# Patient Record
Sex: MALE | Race: WHITE | NOT HISPANIC OR LATINO | Employment: OTHER | URBAN - METROPOLITAN AREA
[De-identification: names, ages, dates, MRNs, and addresses within clinical notes are randomized per-mention and may not be internally consistent; named-entity substitution may affect disease eponyms.]

---

## 2020-09-23 ENCOUNTER — CONSULT (OUTPATIENT)
Dept: CARDIOLOGY | Facility: CLINIC | Age: 70
End: 2020-09-23

## 2020-09-23 VITALS
SYSTOLIC BLOOD PRESSURE: 120 MMHG | HEART RATE: 75 BPM | WEIGHT: 299 LBS | HEIGHT: 71 IN | DIASTOLIC BLOOD PRESSURE: 74 MMHG | TEMPERATURE: 98 F | OXYGEN SATURATION: 97 % | BODY MASS INDEX: 41.86 KG/M2

## 2020-09-23 DIAGNOSIS — Z86.73 HISTORY OF CVA (CEREBROVASCULAR ACCIDENT): ICD-10-CM

## 2020-09-23 DIAGNOSIS — I48.0 PAROXYSMAL ATRIAL FIBRILLATION (HCC): Primary | ICD-10-CM

## 2020-09-23 DIAGNOSIS — I10 ESSENTIAL HYPERTENSION: ICD-10-CM

## 2020-09-23 DIAGNOSIS — G47.33 OSA TREATED WITH BIPAP: ICD-10-CM

## 2020-09-23 PROCEDURE — 99204 OFFICE O/P NEW MOD 45 MIN: CPT | Performed by: INTERNAL MEDICINE

## 2020-09-23 PROCEDURE — 93280 PM DEVICE PROGR EVAL DUAL: CPT | Performed by: INTERNAL MEDICINE

## 2020-09-23 PROCEDURE — 93000 ELECTROCARDIOGRAM COMPLETE: CPT | Performed by: INTERNAL MEDICINE

## 2020-09-23 RX ORDER — GABAPENTIN 300 MG/1
300 CAPSULE ORAL 3 TIMES DAILY
COMMUNITY
End: 2021-02-23 | Stop reason: SDUPTHER

## 2020-09-23 RX ORDER — FLUTICASONE PROPIONATE 50 MCG
2 SPRAY, SUSPENSION (ML) NASAL DAILY
COMMUNITY
End: 2021-06-14 | Stop reason: SDUPTHER

## 2020-09-23 RX ORDER — BISOPROLOL FUMARATE 10 MG/1
5 TABLET, FILM COATED ORAL 2 TIMES DAILY
COMMUNITY
End: 2022-07-08

## 2020-09-23 RX ORDER — ATORVASTATIN CALCIUM 40 MG/1
40 TABLET, FILM COATED ORAL DAILY
COMMUNITY
End: 2022-04-11

## 2020-09-23 RX ORDER — ACETAMINOPHEN 160 MG/5ML
650 SOLUTION ORAL EVERY 6 HOURS PRN
COMMUNITY

## 2020-09-23 RX ORDER — WARFARIN SODIUM 10 MG/1
10 TABLET ORAL
COMMUNITY
End: 2021-11-10 | Stop reason: ALTCHOICE

## 2020-09-23 RX ORDER — PROBENECID 500 MG/1
500 TABLET, FILM COATED ORAL 2 TIMES DAILY
COMMUNITY

## 2020-09-23 NOTE — PROGRESS NOTES
Electrophysiology Clinic Consult     Walter Herrera  1950  [unfilled]  [unfilled]    09/23/20    DATE OF ADMISSION: (Not on file)  [facility] CARDIOLOGY    Michael Mendiola MD  [address] / [facility] [address]    Chief Complaint   Patient presents with   • Evaluation for Watchman Device       Problem List:  1. Paroxysmal Atrial Fibrillation   a. CHADSvasc = 6  on warfarin (used to be Eliquis- unable to afford cost)   b. Echocardiogram 10/11/19: EF 60%, moderate mitral annular calcification, LA normal in size  2. Coronary Artery Disease  a. CABG x 2 1/2016  b. Stress Test 10/11/19: normal myocardial perfusion imaging, EF normal without wall motion abnormality  3. Sick Sinus Syndrome  a. BSC PPM implant 4/2016  4. DMII - Metformin and insulin  5. HTN  6. HLP  7. MONIQUE - compliant with Bipap  8. History of CVA 6/2020 - on warfarin with subtherapeutic INR   9. Surgical History:  a. Cholecystectomy  b. CABG  c. Right shoulder surgery x 2   d. Right and left inguinal hernia repair       History of Present Illness:   The patient is a pleasant 69 year old WM with above history who presents today in consultation, referred by Dr. Claudio, for evaluation of Watchman Device. He has had atrial fibrillation since 2015 and never required ECV or been treated with an AAD other than beta blockers. The patient is overall controlled in regards to his atrial fibrillation with 8% burden on his device check today. He does have mild symptoms of palpitations and SOB, which does affect his quality of life, with episodes lasting about 1-2 minutes at a time. When in NSR, he feels well without complaints of CP or SOB.  He is currently on warfarin since June after he had a CVA. He had a subtherapeutic INR when he had a CVA. He used to be on Eliquis, but stopped due to cost. He has mild residual speech deficit.  When he first started on warfarin he did have labile INRs, but now INRs have  overall been therapeutic. He denies any current bleeding issues. He does not enjoy diet restrictions that he has to do with warfarin, and he thinks that his warfarin causes diarrhea. He has HTN which is well controlled. No thyroid issues. He has MONIQUE and wears Bipap nightly. No tobacco. Occasionally ETOH. He drinks half and half coffee.          Allergies   Allergen Reactions   • Erythromycin Diarrhea   • Morphine Unknown - Low Severity     Blisters   • Penicillins Rash        Cannot display prior to admission medications because the patient has not been admitted in this contact.            Current Outpatient Medications:   •  acetaminophen (TYLENOL) 500 MG tablet, Take 500 mg by mouth Every 6 (Six) Hours As Needed for Mild Pain ., Disp: , Rfl:   •  atorvastatin (LIPITOR) 80 MG tablet, Take 80 mg by mouth Daily., Disp: , Rfl:   •  bisoprolol (ZEBeta) 10 MG tablet, Take 10 mg by mouth Daily., Disp: , Rfl:   •  fluticasone (FLONASE) 50 MCG/ACT nasal spray, 2 sprays into the nostril(s) as directed by provider Daily., Disp: , Rfl:   •  gabapentin (NEURONTIN) 300 MG capsule, Take 300 mg by mouth 3 (Three) Times a Day., Disp: , Rfl:   •  insulin NPH-insulin regular (humuLIN 70/30,novoLIN 70/30) (70-30) 100 UNIT/ML injection, Inject  under the skin into the appropriate area as directed 2 (Two) Times a Day With Meals. 70 units qam and 16 units qpm, Disp: , Rfl:   •  metFORMIN (GLUCOPHAGE) 500 MG tablet, Take 500 mg by mouth 2 (Two) Times a Day With Meals., Disp: , Rfl:   •  probenecid (BENEMID) 500 MG tablet, Take 500 mg by mouth 2 (Two) Times a Day., Disp: , Rfl:   •  warfarin (COUMADIN) 10 MG tablet, Take 10 mg by mouth. 10mg on M,W,F,Sun, 12.5mg Tues & Sat and 11mg on Thurs., Disp: , Rfl:     Social History     Socioeconomic History   • Marital status:      Spouse name: Not on file   • Number of children: Not on file   • Years of education: Not on file   • Highest education level: Not on file   Tobacco Use   •  "Smoking status: Former Smoker     Types: Pipe     Quit date: 1972     Years since quittin.0   • Smokeless tobacco: Never Used   Substance and Sexual Activity   • Alcohol use: Yes     Alcohol/week: 1.0 standard drinks     Types: 1 Cans of beer per week     Comment: Occassionally with pizza   • Drug use: Never   • Sexual activity: Defer       Family History   Problem Relation Age of Onset   • Heart attack Mother    • Heart disease Sister    • Heart disease Brother    • Heart disease Brother        REVIEW OF SYSTEMS:   CONST:  No weight loss, fever, chills, weakness + fatigue.   HEENT:  No visual loss, blurred vision, double vision, yellow sclerae.                   No hearing loss, congestion, sore throat.   SKIN:      No rashes, urticaria, ulcers, sores.     RESP:     + shortness of breath,-  hemoptysis, cough, sputum.   GI:           No anorexia, nausea, vomiting, diarrhea. No abdominal pain, melena.   :         No burning on urination, hematuria or increased frequency.  ENDO:    No diaphoresis, cold or heat intolerance. No polyuria or polydipsia.   NEURO:  No headache, dizziness, syncope, paralysis, ataxia, or parasthesias.                  No change in bowel or bladder control. + history of CVA/TIA  MUSC:    No muscle, back pain, joint pain or stiffness.   HEME:    No anemia, bleeding, bruising. No history of DVT/PE.  PSYCH:  No history of depression, anxiety    Vitals:    20 0839   BP: 120/74   BP Location: Left arm   Patient Position: Sitting   Pulse: 75   Temp: 98 °F (36.7 °C)   SpO2: 97%   Weight: 136 kg (299 lb)   Height: 180.3 cm (71\")                 Physical Exam:  GEN: Well nourished, well-developed, no acute distress  HEENT: Normocephalic, atraumatic, PERRLA, moist mucous membranes  NECK: Supple, NO JVD, no thyromegaly, no lymphadenopathy  CARD: S1S2, RRR, no murmur, gallop, rub, PMI NL  LUNGS: Clear to auscultation, normal respiratory effort  ABDOMEN: Soft, nontender, normal bowel " sounds  EXTREMITIES: No gross deformities, no clubbing, cyanosis, or edema  SKIN: Warm, dry, no lesions  NEURO: No focal deficits, alert and oriented x 3  PSYCHIATRIC: Normal affect and mood      I personally viewed and interpreted the patient's EKG/Telemetry/lab data    No results found for: GLUCOSE, CALCIUM, NA, K, CO2, CL, BUN, CREATININE, EGFRIFAFRI, EGFRIFNONA, BCR, ANIONGAP  No results found for: WBC, HGB, HCT, MCV, PLT  No results found for: INR, PROTIME  No results found for: TSH, X5OZBHT, B4XKSUC, THYROIDAB    BSC PM Manual Interrogation: normal function. 8% atrial fibrillation. 8 years on battery.         ECG 12 Lead    Date/Time: 9/23/2020 9:32 AM  Performed by: Derek Vergara MD  Authorized by: Derek Vergara MD   Comparison: not compared with previous ECG   Previous ECG: no previous ECG available  Rhythm: sinus rhythm and paced  BPM: 75                ICD-10-CM ICD-9-CM   1. Paroxysmal atrial fibrillation (CMS/formerly [facility])  I48.0 427.31   2. History of CVA (cerebrovascular accident)  Z86.73 V12.54   3. Essential hypertension  I10 401.9   4. MONIQUE treated with BiPAP  G47.33 327.23       Assessment and Plan:   1. Paroxysmal Atrial Fibrillation:  - overall 8% burden on beta blocker alone. If he becomes more symptomatic or has more frequent episodes, could treat with antiarrythmic.  - CHADSVasc = 6  He has had a previous CVA in June 2020 and is of high risk for recurrent CVA. He has been on warfarin since his CVA and has had labile INRs and also side effects with the medication of GI upset. We did discuss replacing warfarin with a NOAC. He is unable to afford NOACs and was on Eliquis in the past, which he stopped just prior to his CVA. We discussed Watchman Device with the patient and his wife today. The risks, benefits, and alternatives of the procedure have been reviewed and the patient wishes to proceed. We discussed CVA risk off anticoagulants (7-8%), on anticoagulants (less than 1%), and with Watchman  Device (1%).        2. History of CVA:  - occurred June 2020 in setting of subtherapeutic INR    3. HTN:  - controlled on current medications    4. MONIQUE:  - patient is compliant with BiPaP        Scribed for Derek Vergara MD by Lesly Portillo PA-C. 9/23/2020  09:37 EDT     I, Derek Vergara MD, personally performed the services described in this documentation as scribed by the above named individual in my presence, and it is both accurate and complete.  9/23/2020  09:44 EDT

## 2020-10-05 ENCOUNTER — TELEPHONE (OUTPATIENT)
Dept: ENDOCRINOLOGY | Facility: CLINIC | Age: 70
End: 2020-10-05

## 2020-10-05 NOTE — TELEPHONE ENCOUNTER
PATIENT IS CALLING NEEDING TO SEE IF IT IS SAFE FOR HIM TO TAKE METOMUCEL FOR DIARRHEA. PATIENTS NUMBER -[phone]

## 2020-10-06 ENCOUNTER — TELEPHONE (OUTPATIENT)
Dept: CARDIOLOGY | Facility: CLINIC | Age: 70
End: 2020-10-06

## 2020-10-06 NOTE — TELEPHONE ENCOUNTER
Spoke with pt to follow up on discussion of Watchman device.  He is interested in proceeding.  States he has an appt with his eye doctor tomorrow for retina issue and anticipates surgery.  Pt asked for Dec Watchman date.  SDM letter and tool faxed to PCP Dr Michael Mendiola at [phone].  Pt will discuss at 10/16 ov.    Olena Barcenas RN

## 2020-10-14 DIAGNOSIS — I48.0 PAROXYSMAL ATRIAL FIBRILLATION (HCC): Primary | ICD-10-CM

## 2020-10-14 DIAGNOSIS — Z86.73 HISTORY OF CVA (CEREBROVASCULAR ACCIDENT): ICD-10-CM

## 2020-10-22 ENCOUNTER — TELEPHONE (OUTPATIENT)
Dept: CARDIOLOGY | Facility: CLINIC | Age: 70
End: 2020-10-22

## 2020-10-22 DIAGNOSIS — I48.0 PAROXYSMAL ATRIAL FIBRILLATION (HCC): Primary | ICD-10-CM

## 2020-10-22 NOTE — TELEPHONE ENCOUNTER
Spoke with pt about upcoming watchman.  He states he saw Dr Mendiola for SDM.    Spoke with Mabel in [place] office.  She will fax SDM and per Skylerna Dr Mendiola request [facility] [place] Anticoagulation clinic follow INRs.  Referral sent    Olena Barcenas RN

## 2020-10-23 ENCOUNTER — TELEPHONE (OUTPATIENT)
Dept: PHARMACY | Facility: HOSPITAL | Age: 70
End: 2020-10-23

## 2020-10-23 NOTE — TELEPHONE ENCOUNTER
"Spoke with patient re: new referral. Have briefly introduced myself and the warfarin clinic. Patient reports he is currently taking warfarin 10mg daily except for 11mg on Thurs and 12.5mg on TuesSat (76mg/week). He reports he has both 1mg and 5mg tablets on hand.. He reports his INR drawn last Fri, 10/16, at 2.9 and INR drawn \"a couple Thursdays prior\" at 2.3.     Patient would like to come into clinic to have INR drawn (vs having INR drawn locally at [facility]). Have set up appt for 10/30 at 1145. Patient confirms he has our phone number in case of questions/issues and is aware we will call day before to verify appt and COVID screening questions. He is agreeable to continue with current maintenance dose of warfarin and had no further questions at this time.  "

## 2020-10-23 NOTE — TELEPHONE ENCOUNTER
aWlter Herrera is a new referral to [facility] Anticoagulation clinic from Derek Vergara MD.    Patient's indication for anticoagulation therapy is for paroxysmal afib / hx of CVA. His INR goal range is 2.0-3.0. Patient will have watchman procedure on 11/1/20 and will be requested to hold warfarin 1 day prior to procedure. It appears patient was previously managed by PCP, Dr Michael Mendiola, but per Olena Barcenas, RN, note:      Spoke with pt about upcoming watchman.  He states he saw Dr Mendiola for SDM.     Spoke with Mabel in [place] office.  She will fax SDM and per Farzana Mendiola request [facility] Anticoagulation clinic follow INRs.  Referral sent    Per Dr. Vergara's consult on 9/23/20: CHADVASc = 6. Was previously on Eliquis but unable to afford cost.  He is currently on warfarin since June after he had a CVA. He had a subtherapeutic INR when he had a CVA. He used to be on Eliquis, but stopped due to cost. He has mild residual speech deficit.  When he first started on warfarin he did have labile INRs, but now INRs have overall been therapeutic. He denies any current bleeding issues. He does not enjoy diet restrictions that he has to do with warfarin, and he thinks that his warfarin causes diarrhea.

## 2020-10-27 ENCOUNTER — APPOINTMENT (OUTPATIENT)
Dept: PREADMISSION TESTING | Facility: HOSPITAL | Age: 70
End: 2020-10-27

## 2020-10-29 ENCOUNTER — PREP FOR SURGERY (OUTPATIENT)
Dept: OTHER | Facility: HOSPITAL | Age: 70
End: 2020-10-29

## 2020-10-29 DIAGNOSIS — I48.0 PAROXYSMAL ATRIAL FIBRILLATION (HCC): Primary | ICD-10-CM

## 2020-10-29 RX ORDER — NITROGLYCERIN 0.4 MG/1
0.4 TABLET SUBLINGUAL
Status: CANCELLED | OUTPATIENT
Start: 2020-10-29

## 2020-10-29 RX ORDER — ACETAMINOPHEN 325 MG/1
650 TABLET ORAL EVERY 4 HOURS PRN
Status: CANCELLED | OUTPATIENT
Start: 2020-10-29

## 2020-10-29 RX ORDER — CEFAZOLIN SODIUM 2 G/100ML
2 INJECTION, SOLUTION INTRAVENOUS ONCE
Status: CANCELLED | OUTPATIENT
Start: 2020-10-29 | End: 2020-10-29

## 2020-10-29 RX ORDER — SODIUM CHLORIDE 0.9 % (FLUSH) 0.9 %
10 SYRINGE (ML) INJECTION AS NEEDED
Status: CANCELLED | OUTPATIENT
Start: 2020-10-29

## 2020-10-29 RX ORDER — SODIUM CHLORIDE 0.9 % (FLUSH) 0.9 %
3 SYRINGE (ML) INJECTION EVERY 12 HOURS SCHEDULED
Status: CANCELLED | OUTPATIENT
Start: 2020-10-29

## 2020-10-29 RX ORDER — SODIUM CHLORIDE 9 MG/ML
1 INJECTION, SOLUTION INTRAVENOUS CONTINUOUS
Status: CANCELLED | OUTPATIENT
Start: 2020-10-29 | End: 2020-10-29

## 2020-10-30 ENCOUNTER — TELEPHONE (OUTPATIENT)
Dept: PHARMACY | Facility: HOSPITAL | Age: 70
End: 2020-10-30

## 2020-10-30 ENCOUNTER — APPOINTMENT (OUTPATIENT)
Dept: PHARMACY | Facility: HOSPITAL | Age: 70
End: 2020-10-30

## 2020-10-30 ENCOUNTER — APPOINTMENT (OUTPATIENT)
Dept: PREADMISSION TESTING | Facility: HOSPITAL | Age: 70
End: 2020-10-30

## 2020-10-30 DIAGNOSIS — I48.0 PAROXYSMAL ATRIAL FIBRILLATION (HCC): ICD-10-CM

## 2020-10-30 NOTE — TELEPHONE ENCOUNTER
Pt cancelled appointment today due his wife's hospitalization. He is also cancelling his watchman device procedure. Will contact the patient on Monday to determine how to move forward.

## 2020-11-12 ENCOUNTER — OFFICE VISIT (OUTPATIENT)
Dept: ENDOCRINOLOGY | Facility: CLINIC | Age: 70
End: 2020-11-12

## 2020-11-12 ENCOUNTER — LAB (OUTPATIENT)
Dept: LAB | Facility: HOSPITAL | Age: 70
End: 2020-11-12

## 2020-11-12 VITALS
WEIGHT: 294.4 LBS | HEIGHT: 71 IN | HEART RATE: 74 BPM | OXYGEN SATURATION: 97 % | SYSTOLIC BLOOD PRESSURE: 136 MMHG | TEMPERATURE: 98 F | DIASTOLIC BLOOD PRESSURE: 84 MMHG | BODY MASS INDEX: 41.22 KG/M2

## 2020-11-12 DIAGNOSIS — E11.65 UNCONTROLLED TYPE 2 DIABETES MELLITUS WITH HYPERGLYCEMIA (HCC): ICD-10-CM

## 2020-11-12 DIAGNOSIS — Z79.4 INSULIN LONG-TERM USE (HCC): ICD-10-CM

## 2020-11-12 DIAGNOSIS — E11.65 UNCONTROLLED TYPE 2 DIABETES MELLITUS WITH HYPERGLYCEMIA (HCC): Primary | ICD-10-CM

## 2020-11-12 DIAGNOSIS — E11.649 TYPE 2 DIABETES MELLITUS WITH HYPOGLYCEMIA WITHOUT COMA, WITH LONG-TERM CURRENT USE OF INSULIN (HCC): ICD-10-CM

## 2020-11-12 DIAGNOSIS — E11.49 TYPE 2 DIABETES MELLITUS WITH NEUROLOGICAL MANIFESTATIONS (HCC): ICD-10-CM

## 2020-11-12 DIAGNOSIS — I25.10 ATHEROSCLEROSIS OF NATIVE CORONARY ARTERY OF NATIVE HEART WITHOUT ANGINA PECTORIS: ICD-10-CM

## 2020-11-12 DIAGNOSIS — Z79.4 TYPE 2 DIABETES MELLITUS WITH HYPOGLYCEMIA WITHOUT COMA, WITH LONG-TERM CURRENT USE OF INSULIN (HCC): ICD-10-CM

## 2020-11-12 DIAGNOSIS — I10 ESSENTIAL HYPERTENSION: ICD-10-CM

## 2020-11-12 PROBLEM — E06.1 SUBACUTE THYROIDITIS: Status: ACTIVE | Noted: 2020-11-12

## 2020-11-12 PROBLEM — E78.2 MIXED HYPERLIPIDEMIA: Status: ACTIVE | Noted: 2020-11-12

## 2020-11-12 LAB
EXPIRATION DATE: NORMAL
HBA1C MFR BLD: 7.4 %
Lab: NORMAL

## 2020-11-12 PROCEDURE — 99214 OFFICE O/P EST MOD 30 MIN: CPT | Performed by: INTERNAL MEDICINE

## 2020-11-12 PROCEDURE — 83036 HEMOGLOBIN GLYCOSYLATED A1C: CPT | Performed by: INTERNAL MEDICINE

## 2020-11-12 PROCEDURE — 82570 ASSAY OF URINE CREATININE: CPT

## 2020-11-12 PROCEDURE — 82043 UR ALBUMIN QUANTITATIVE: CPT

## 2020-11-12 RX ORDER — BUMETANIDE 2 MG/1
1 TABLET ORAL DAILY PRN
COMMUNITY

## 2020-11-12 RX ORDER — SYRINGE AND NEEDLE,INSULIN,1ML 31 GX5/16"
SYRINGE, EMPTY DISPOSABLE MISCELLANEOUS
COMMUNITY
Start: 2020-10-08 | End: 2021-04-05 | Stop reason: SDUPTHER

## 2020-11-12 RX ORDER — NITROGLYCERIN 400 UG/1
SPRAY ORAL
COMMUNITY
End: 2021-10-04 | Stop reason: ALTCHOICE

## 2020-11-12 NOTE — ASSESSMENT & PLAN NOTE
Diabetes is unchanged.   Continue current treatment regimen.  Diabetes will be reassessed in 3 months.    A1c acceptable but higher.  He has been stressed.  His wife is very ill.  Recent FSBS okay though.

## 2020-11-12 NOTE — PROGRESS NOTES
"     Office Note      Date: 2020  Patient Name: Walter Herrera  MRN: [id number]  : 1950    Chief Complaint   Patient presents with   • Follow-up   • Diabetes       History of Present Illness:   Walter Herrera is a 69 y.o. male who presents for Diabetes type 2. Diagnosed in: . Treated in past with oral agents. Current treatments: metformin and premix insulin. Number of insulin shots per day: 2. Checks blood sugar 2 times a day. Has low blood sugar: occasional. Aspirin use: No - on warfarin. Statin use: Yes. ACE-I/ARB use: No -  . Changes in health since last visit: cataracts. Last eye exam .    Subjective      Diabetic Complications:  Eyes: No  Kidneys: No  Feet: Yes - neuropathy  Heart: Yes -      Diet and Exercise:  Meals per day: 3  Minutes of exercise per week: 0 mins.    Review of Systems:   Review of Systems   Constitutional: Negative.    Cardiovascular: Negative.    Gastrointestinal: Negative.    Endocrine: Negative.        The following portions of the patient's history were reviewed and updated as appropriate: allergies, current medications, past family history, past medical history, past social history, past surgical history and problem list.    Objective       Visit Vitals  /84   Pulse 74   Temp 98 °F (36.7 °C) (Infrared)   Ht 180.3 cm (71\")   Wt 134 kg (294 lb 6.4 oz)   SpO2 97%   BMI 41.06 kg/m²       Physical Exam:  Physical Exam  Constitutional:       Appearance: Normal appearance.   Neurological:      Mental Status: He is alert.         Labs:    HbA1c  Lab Results   Component Value Date    HGBA1C 7.4 2020       CMP  No results found for: GLUCOSE, BUN, CREATININE, EGFRIFNONA, EGFRIFAFRI, BCR, K, CO2, CALCIUM, PROTENTOTREF, LABIL2, BILIRUBIN, AST, ALT     Lipid Panel        TSH  No results found for: TSH, FREET4     Hemoglobin A1C  Lab Results   Component Value Date    HGBA1C 7.4 2020        Microalbumin/Creatinine  No results found for: MALBCRERATIO, CREATINIURIN, " MICROALBUR        Assessment / Plan      Assessment & Plan:  Problem List Items Addressed This Visit        Cardiovascular and Mediastinum    Essential hypertension    Current Assessment & Plan     Hypertension is unchanged.  Continue current treatment regimen.  Blood pressure will be reassessed in 3 months.         Relevant Medications    bisoprolol (ZEBeta) 10 MG tablet    bumetanide (BUMEX) 2 MG tablet    Atherosclerosis of native coronary artery of native heart without angina pectoris    Current Assessment & Plan     Continue cardiology follow up.         Relevant Medications    bisoprolol (ZEBeta) 10 MG tablet    nitroglycerin (Nitrolingual) 0.4 MG/SPRAY spray       Endocrine    Uncontrolled type 2 diabetes mellitus with hyperglycemia (CMS/HCC) - Primary    Current Assessment & Plan     Diabetes is unchanged.   Continue current treatment regimen.  Diabetes will be reassessed in 3 months.    A1c acceptable but higher.  He has been stressed.  His wife is very ill.  Recent FSBS okay though.         Relevant Medications    metFORMIN (GLUCOPHAGE) 500 MG tablet    insulin NPH-insulin regular (humuLIN 70/30,novoLIN 70/30) (70-30) 100 UNIT/ML injection    Other Relevant Orders    POC Glycosylated Hemoglobin (Hb A1C) (Completed)    Microalbumin / Creatinine Urine Ratio - Urine, Clean Catch    Type 2 diabetes mellitus with hypoglycemia without coma, with long-term current use of insulin (CMS/HCC)    Relevant Medications    metFORMIN (GLUCOPHAGE) 500 MG tablet    insulin NPH-insulin regular (humuLIN 70/30,novoLIN 70/30) (70-30) 100 UNIT/ML injection    Type 2 diabetes mellitus with neurological manifestations (CMS/HCC)    Relevant Medications    metFORMIN (GLUCOPHAGE) 500 MG tablet    insulin NPH-insulin regular (humuLIN 70/30,novoLIN 70/30) (70-30) 100 UNIT/ML injection       Other    Insulin long-term use (CMS/HCC)           Return in about 3 months (around 2/12/2021) for Recheck with A1c.    Misael Vidales MD    11/12/2020

## 2020-11-13 LAB
ALBUMIN UR-MCNC: <1.2 MG/DL
CREAT UR-MCNC: 40.7 MG/DL
MICROALBUMIN/CREAT UR: NORMAL MG/G{CREAT}

## 2020-12-23 ENCOUNTER — TELEPHONE (OUTPATIENT)
Dept: ENDOCRINOLOGY | Facility: CLINIC | Age: 70
End: 2020-12-23

## 2021-01-07 ENCOUNTER — TELEPHONE (OUTPATIENT)
Dept: ENDOCRINOLOGY | Facility: CLINIC | Age: 71
End: 2021-01-07

## 2021-01-07 NOTE — TELEPHONE ENCOUNTER
----- Message from Misael Vidales MD sent at 1/7/2021  3:42 PM EST -----      ----- Message -----  From: Darcy Avila  Sent: 1/7/2021   3:36 PM EST  To: Misael Vidales MD

## 2021-01-20 ENCOUNTER — TELEPHONE (OUTPATIENT)
Dept: ENDOCRINOLOGY | Facility: CLINIC | Age: 71
End: 2021-01-20

## 2021-01-20 NOTE — TELEPHONE ENCOUNTER
----- Message from Misael Vidales MD sent at 1/19/2021 11:16 AM EST -----    Glucose readings are okay aside from when he forgets his medication or eats poorly.  Just needs to work on being more consistent with diet and medications.

## 2021-02-19 ENCOUNTER — TELEPHONE (OUTPATIENT)
Dept: ENDOCRINOLOGY | Facility: CLINIC | Age: 71
End: 2021-02-19

## 2021-02-19 NOTE — TELEPHONE ENCOUNTER
Patient notified and agreeable with plan.  He wanted to let you know that his wife passed away on Monday.

## 2021-02-23 DIAGNOSIS — E11.49 TYPE 2 DIABETES MELLITUS WITH NEUROLOGICAL MANIFESTATIONS (HCC): Primary | ICD-10-CM

## 2021-02-23 RX ORDER — GABAPENTIN 300 MG/1
300 CAPSULE ORAL 3 TIMES DAILY
Qty: 270 CAPSULE | Refills: 1 | Status: SHIPPED | OUTPATIENT
Start: 2021-02-23 | End: 2021-08-30

## 2021-02-25 ENCOUNTER — OFFICE VISIT (OUTPATIENT)
Dept: ENDOCRINOLOGY | Facility: CLINIC | Age: 71
End: 2021-02-25

## 2021-02-25 VITALS
DIASTOLIC BLOOD PRESSURE: 64 MMHG | SYSTOLIC BLOOD PRESSURE: 122 MMHG | TEMPERATURE: 97.1 F | BODY MASS INDEX: 37.93 KG/M2 | HEIGHT: 72 IN | WEIGHT: 280 LBS

## 2021-02-25 DIAGNOSIS — Z79.4 TYPE 2 DIABETES MELLITUS WITH HYPOGLYCEMIA WITHOUT COMA, WITH LONG-TERM CURRENT USE OF INSULIN (HCC): ICD-10-CM

## 2021-02-25 DIAGNOSIS — E11.649 TYPE 2 DIABETES MELLITUS WITH HYPOGLYCEMIA WITHOUT COMA, WITH LONG-TERM CURRENT USE OF INSULIN (HCC): ICD-10-CM

## 2021-02-25 DIAGNOSIS — Z79.4 INSULIN LONG-TERM USE (HCC): ICD-10-CM

## 2021-02-25 DIAGNOSIS — E11.65 UNCONTROLLED TYPE 2 DIABETES MELLITUS WITH HYPERGLYCEMIA (HCC): Primary | ICD-10-CM

## 2021-02-25 DIAGNOSIS — I10 ESSENTIAL HYPERTENSION: ICD-10-CM

## 2021-02-25 DIAGNOSIS — E78.2 MIXED HYPERLIPIDEMIA: ICD-10-CM

## 2021-02-25 LAB
EXPIRATION DATE: NORMAL
HBA1C MFR BLD: 7.2 %
Lab: NORMAL

## 2021-02-25 PROCEDURE — 83036 HEMOGLOBIN GLYCOSYLATED A1C: CPT | Performed by: INTERNAL MEDICINE

## 2021-02-25 PROCEDURE — 99214 OFFICE O/P EST MOD 30 MIN: CPT | Performed by: INTERNAL MEDICINE

## 2021-02-25 RX ORDER — LANCETS
EACH MISCELLANEOUS
COMMUNITY
Start: 2020-12-01 | End: 2021-04-05 | Stop reason: SDUPTHER

## 2021-02-25 RX ORDER — BLOOD SUGAR DIAGNOSTIC
STRIP MISCELLANEOUS
COMMUNITY
Start: 2020-12-07 | End: 2021-09-24

## 2021-02-25 NOTE — PROGRESS NOTES
"     Office Note      Date: 2021  Patient Name: Walter Herrera  MRN: [id number]  : 1950    Chief Complaint   Patient presents with   • Follow-up   • Diabetes       History of Present Illness:   Walter Herrera is a 70 y.o. male who presents for Diabetes type 2. Diagnosed in: . Treated in past with oral agents. Current treatments: metformin and premix insulin. Number of insulin shots per day: 2. Checks blood sugar 2 times a day. Has low blood sugar: occasional. Aspirin use: No - on warfarin. Statin use: Yes. ACE-I/ARB use: No -  . Changes in health since last visit: none. Last eye exam .    He has been stressed.  His wife recently passed away.    Subjective      Diabetic Complications:  Eyes: No  Kidneys: No  Feet: Yes - neuropathy  Heart: Yes -      Diet and Exercise:  Meals per day: 3  Minutes of exercise per week: 0 mins.    Review of Systems:   Review of Systems   Constitutional: Negative.    Cardiovascular: Negative.    Gastrointestinal: Negative.    Endocrine: Negative.        The following portions of the patient's history were reviewed and updated as appropriate: allergies, current medications, past family history, past medical history, past social history, past surgical history and problem list.    Objective       Visit Vitals  /64   Temp 97.1 °F (36.2 °C) (Infrared)   Ht 182.9 cm (72\")   Wt 127 kg (280 lb)   BMI 37.97 kg/m²       Physical Exam:  Physical Exam  Constitutional:       Appearance: Normal appearance.   Neurological:      Mental Status: He is alert.         Labs:    HbA1c  Lab Results   Component Value Date    HGBA1C 7.2 2021       CMP  No results found for: GLUCOSE, BUN, CREATININE, EGFRIFNONA, EGFRIFAFRI, BCR, K, CO2, CALCIUM, PROTENTOTREF, LABIL2, BILIRUBIN, AST, ALT     Lipid Panel        TSH  No results found for: TSH, FREET4     Hemoglobin A1C  Lab Results   Component Value Date    HGBA1C 7.2 2021        Microalbumin/Creatinine  Lab Results   Component " Value Date    MAGI  11/12/2020      Comment:      Unable to calculate    MICROALBUR <1.2 11/12/2020           Assessment / Plan      Assessment & Plan:  Diagnoses and all orders for this visit:    1. Uncontrolled type 2 diabetes mellitus with hyperglycemia (CMS/[facility]) (Primary)  Assessment & Plan:  Diabetes is improving with treatment.   Continue current treatment regimen.  Diabetes will be reassessed in 3 months.    Orders:  -     POC Glycosylated Hemoglobin (Hb A1C)    2. Type 2 diabetes mellitus with hypoglycemia without coma, with long-term current use of insulin (CMS/[facility])  Assessment & Plan:  None severe.  Continue frequent FSBS.      3. Essential hypertension  Assessment & Plan:  Hypertension is unchanged.  Continue current treatment regimen.  Blood pressure will be reassessed in 3 months.      4. Mixed hyperlipidemia  Assessment & Plan:  Continue statin.      5. Insulin long-term use (CMS/[facility])      Return in about 3 months (around 5/25/2021) for Recheck with A1c.    Misael Vidales MD   02/25/2021

## 2021-03-30 ENCOUNTER — TELEPHONE (OUTPATIENT)
Dept: ENDOCRINOLOGY | Facility: CLINIC | Age: 71
End: 2021-03-30

## 2021-03-30 NOTE — TELEPHONE ENCOUNTER
----- Message from Misael Vidales MD sent at 3/30/2021  7:49 AM EDT -----      ----- Message -----  From: Maryam Donaldson RegSched Rep  Sent: 3/29/2021  10:09 AM EDT  To: Misael Vidales MD

## 2021-04-05 RX ORDER — LANCETS
EACH MISCELLANEOUS
Qty: 200 EACH | Refills: 1 | Status: SHIPPED | OUTPATIENT
Start: 2021-04-05 | End: 2021-04-13 | Stop reason: SDUPTHER

## 2021-04-05 RX ORDER — SYRINGE AND NEEDLE,INSULIN,1ML 31 GX5/16"
SYRINGE, EMPTY DISPOSABLE MISCELLANEOUS
Qty: 200 EACH | Refills: 1 | Status: SHIPPED | OUTPATIENT
Start: 2021-04-05 | End: 2021-09-24

## 2021-04-13 RX ORDER — LANCETS
EACH MISCELLANEOUS
Qty: 200 EACH | Refills: 1 | Status: SHIPPED | OUTPATIENT
Start: 2021-04-13 | End: 2021-09-24

## 2021-05-06 ENCOUNTER — TELEPHONE (OUTPATIENT)
Dept: CARDIOLOGY | Facility: HOSPITAL | Age: 71
End: 2021-05-06

## 2021-05-06 NOTE — TELEPHONE ENCOUNTER
Spoke with pt to follow up on Watchman device.  He states he would like to proceed in the fall.  Scheduled for 9/2021.  Encouraged to call with any questions in the interim.    Olena Barcenas RN'

## 2021-06-08 ENCOUNTER — TELEPHONE (OUTPATIENT)
Dept: ENDOCRINOLOGY | Facility: CLINIC | Age: 71
End: 2021-06-08

## 2021-06-08 NOTE — TELEPHONE ENCOUNTER
----- Message from Misael Vidales MD sent at 6/8/2021 12:30 PM EDT -----      ----- Message -----  From: Darcy Avila  Sent: 6/8/2021  10:30 AM EDT  To: Misael Vidales MD

## 2021-06-14 ENCOUNTER — OFFICE VISIT (OUTPATIENT)
Dept: ENDOCRINOLOGY | Facility: CLINIC | Age: 71
End: 2021-06-14

## 2021-06-14 VITALS
OXYGEN SATURATION: 97 % | BODY MASS INDEX: 42.73 KG/M2 | HEART RATE: 73 BPM | WEIGHT: 305.2 LBS | SYSTOLIC BLOOD PRESSURE: 148 MMHG | DIASTOLIC BLOOD PRESSURE: 72 MMHG | HEIGHT: 71 IN

## 2021-06-14 DIAGNOSIS — I10 ESSENTIAL HYPERTENSION: ICD-10-CM

## 2021-06-14 DIAGNOSIS — E11.65 UNCONTROLLED TYPE 2 DIABETES MELLITUS WITH HYPERGLYCEMIA (HCC): Primary | ICD-10-CM

## 2021-06-14 DIAGNOSIS — E11.649 TYPE 2 DIABETES MELLITUS WITH HYPOGLYCEMIA WITHOUT COMA, WITH LONG-TERM CURRENT USE OF INSULIN (HCC): ICD-10-CM

## 2021-06-14 DIAGNOSIS — Z79.4 TYPE 2 DIABETES MELLITUS WITH HYPOGLYCEMIA WITHOUT COMA, WITH LONG-TERM CURRENT USE OF INSULIN (HCC): ICD-10-CM

## 2021-06-14 DIAGNOSIS — I25.10 ATHEROSCLEROSIS OF NATIVE CORONARY ARTERY OF NATIVE HEART WITHOUT ANGINA PECTORIS: ICD-10-CM

## 2021-06-14 DIAGNOSIS — Z79.4 INSULIN LONG-TERM USE (HCC): ICD-10-CM

## 2021-06-14 DIAGNOSIS — E78.2 MIXED HYPERLIPIDEMIA: ICD-10-CM

## 2021-06-14 LAB
EXPIRATION DATE: NORMAL
EXPIRATION DATE: NORMAL
GLUCOSE BLDC GLUCOMTR-MCNC: 93 MG/DL (ref 70–130)
HBA1C MFR BLD: 7.5 %
Lab: NORMAL
Lab: NORMAL

## 2021-06-14 PROCEDURE — 82947 ASSAY GLUCOSE BLOOD QUANT: CPT | Performed by: INTERNAL MEDICINE

## 2021-06-14 PROCEDURE — 99214 OFFICE O/P EST MOD 30 MIN: CPT | Performed by: INTERNAL MEDICINE

## 2021-06-14 PROCEDURE — 83036 HEMOGLOBIN GLYCOSYLATED A1C: CPT | Performed by: INTERNAL MEDICINE

## 2021-06-14 RX ORDER — FLUTICASONE PROPIONATE 50 MCG
2 SPRAY, SUSPENSION (ML) NASAL DAILY
Qty: 16 G | Refills: 3 | Status: SHIPPED | OUTPATIENT
Start: 2021-06-14 | End: 2022-03-21

## 2021-06-14 NOTE — ASSESSMENT & PLAN NOTE
Diabetes is worsening.   Continue current treatment regimen.  Diabetes will be reassessed in 3 months.    He has been sending in FSBS for insulin adjustments.

## 2021-06-14 NOTE — PROGRESS NOTES
"     Office Note      Date: 2021  Patient Name: Walter Herrera  MRN: [id number]  : 1950    Chief Complaint   Patient presents with   • Follow-up   • Diabetes     type2    • Diabetic Eye Exam     2021       History of Present Illness:   Walter Herrera is a 70 y.o. male who presents for Diabetes type 2. Diagnosed in: . Treated in past with oral agents. Current treatments: metformin and premix insulin. Number of insulin shots per day: 2. Checks blood sugar 2 times a day. Has low blood sugar: occasional. Aspirin use: No - on warfarin. Statin use: Yes. ACE-I/ARB use: No -  . Changes in health since last visit: none. Last eye exam 10/2020.    Subjective      Diabetic Complications:  Eyes: No  Kidneys: No  Feet: Yes - neuropathy  Heart: Yes -      Diet and Exercise:  Meals per day: 3  Minutes of exercise per week: 0 mins.    Review of Systems:   Review of Systems   Constitutional: Negative.    Cardiovascular: Negative.    Gastrointestinal: Negative.    Endocrine: Negative.        The following portions of the patient's history were reviewed and updated as appropriate: allergies, current medications, past family history, past medical history, past social history, past surgical history and problem list.    Objective       Visit Vitals  /72   Pulse 73   Ht 180.3 cm (71\")   Wt (!) 138 kg (305 lb 3.2 oz)   SpO2 97%   BMI 42.57 kg/m²       Physical Exam:  Physical Exam  Constitutional:       Appearance: Normal appearance.   Neurological:      Mental Status: He is alert.         Labs:    HbA1c  Lab Results   Component Value Date    HGBA1C 7.5 2021       CMP  No results found for: GLUCOSE, BUN, CREATININE, EGFRIFNONA, EGFRIFAFRI, BCR, K, CO2, CALCIUM, PROTENTOTREF, LABIL2, BILIRUBIN, AST, ALT     Lipid Panel        TSH  No results found for: TSH, FREET4     Hemoglobin A1C  Lab Results   Component Value Date    HGBA1C 7.5 2021        Microalbumin/Creatinine  Lab Results   Component Value Date    " MAGI  11/12/2020      Comment:      Unable to calculate    MICROALBUR <1.2 11/12/2020           Assessment / Plan      Assessment & Plan:  Diagnoses and all orders for this visit:    1. Uncontrolled type 2 diabetes mellitus with hyperglycemia (CMS/[facility]) (Primary)  Assessment & Plan:  Diabetes is worsening.   Continue current treatment regimen.  Diabetes will be reassessed in 3 months.    He has been sending in FSBS for insulin adjustments.    Orders:  -     POC Glycosylated Hemoglobin (Hb A1C)  -     POC Glucose, Blood    2. Essential hypertension  Assessment & Plan:  Hypertension is worsening.  BP borderline.  Continue current medications.  Monitor BP at home.  Work on weight loss.  Blood pressure will be reassessed at the next regular appointment.      3. Mixed hyperlipidemia  Assessment & Plan:  Continue statin.      4. Atherosclerosis of native coronary artery of native heart without angina pectoris  Assessment & Plan:  Continue statin.  We have discussed GLP-1 RA and SGLT-2 inhibitor but cost of these has been prohibitive.      5. Insulin long-term use (CMS/[facility])    6. Type 2 diabetes mellitus with hypoglycemia without coma, with long-term current use of insulin (CMS/[facility])  Assessment & Plan:  Having some fasting lows.  Decrease PM insulin.      Other orders  -     fluticasone (FLONASE) 50 MCG/ACT nasal spray; 2 sprays into the nostril(s) as directed by provider Daily.  Dispense: 16 g; Refill: 3      Return in about 3 months (around 9/14/2021) for Recheck with A1c, CMP, lipids, TSH, microalbumin.    Misael Vidales MD   06/14/2021

## 2021-06-14 NOTE — ASSESSMENT & PLAN NOTE
Hypertension is worsening.  BP borderline.  Continue current medications.  Monitor BP at home.  Work on weight loss.  Blood pressure will be reassessed at the next regular appointment.

## 2021-06-14 NOTE — ASSESSMENT & PLAN NOTE
Continue statin.  We have discussed GLP-1 RA and SGLT-2 inhibitor but cost of these has been prohibitive.

## 2021-07-28 ENCOUNTER — TELEPHONE (OUTPATIENT)
Dept: ENDOCRINOLOGY | Facility: CLINIC | Age: 71
End: 2021-07-28

## 2021-07-28 NOTE — TELEPHONE ENCOUNTER
----- Message from Misael Vidales MD sent at 7/28/2021  2:59 PM EDT -----      ----- Message -----  From: Darcy Avila  Sent: 7/28/2021   2:38 PM EDT  To: Misael Vidales MD

## 2021-08-12 ENCOUNTER — TELEPHONE (OUTPATIENT)
Dept: ENDOCRINOLOGY | Facility: CLINIC | Age: 71
End: 2021-08-12

## 2021-08-12 NOTE — TELEPHONE ENCOUNTER
Call pt per wrm:  He reviewed blood sugars increase am insulin by 5 units.  He will take 82 units am.  He voiced understanding

## 2021-08-24 ENCOUNTER — TELEPHONE (OUTPATIENT)
Dept: ENDOCRINOLOGY | Facility: CLINIC | Age: 71
End: 2021-08-24

## 2021-08-24 NOTE — TELEPHONE ENCOUNTER
Called pt the let him know that his glucose reading were okay and no changes needed to be made, per Dr. Vidales.    He verbally understood.

## 2021-08-27 DIAGNOSIS — I48.0 PAROXYSMAL ATRIAL FIBRILLATION (HCC): Primary | ICD-10-CM

## 2021-08-27 DIAGNOSIS — R79.1 SUPRATHERAPEUTIC INR: ICD-10-CM

## 2021-08-28 DIAGNOSIS — E11.49 TYPE 2 DIABETES MELLITUS WITH NEUROLOGICAL MANIFESTATIONS (HCC): ICD-10-CM

## 2021-08-30 RX ORDER — GABAPENTIN 300 MG/1
CAPSULE ORAL
Qty: 270 CAPSULE | Refills: 0 | Status: SHIPPED | OUTPATIENT
Start: 2021-08-30 | End: 2021-12-27

## 2021-09-02 ENCOUNTER — PREP FOR SURGERY (OUTPATIENT)
Dept: OTHER | Facility: HOSPITAL | Age: 71
End: 2021-09-02

## 2021-09-02 DIAGNOSIS — I48.0 PAROXYSMAL ATRIAL FIBRILLATION (HCC): Primary | ICD-10-CM

## 2021-09-02 RX ORDER — SODIUM CHLORIDE 0.9 % (FLUSH) 0.9 %
10 SYRINGE (ML) INJECTION AS NEEDED
Status: CANCELLED | OUTPATIENT
Start: 2021-09-02

## 2021-09-02 RX ORDER — SODIUM CHLORIDE 0.9 % (FLUSH) 0.9 %
3 SYRINGE (ML) INJECTION EVERY 12 HOURS SCHEDULED
Status: CANCELLED | OUTPATIENT
Start: 2021-09-02

## 2021-09-02 RX ORDER — SODIUM CHLORIDE 9 MG/ML
1 INJECTION, SOLUTION INTRAVENOUS CONTINUOUS
Status: CANCELLED | OUTPATIENT
Start: 2021-09-02 | End: 2021-09-02

## 2021-09-02 RX ORDER — ACETAMINOPHEN 325 MG/1
650 TABLET ORAL EVERY 4 HOURS PRN
Status: CANCELLED | OUTPATIENT
Start: 2021-09-02

## 2021-09-15 ENCOUNTER — APPOINTMENT (OUTPATIENT)
Dept: PREADMISSION TESTING | Facility: HOSPITAL | Age: 71
End: 2021-09-15

## 2021-09-22 ENCOUNTER — TELEPHONE (OUTPATIENT)
Dept: ENDOCRINOLOGY | Facility: CLINIC | Age: 71
End: 2021-09-22

## 2021-09-24 ENCOUNTER — PRE-ADMISSION TESTING (OUTPATIENT)
Dept: PREADMISSION TESTING | Facility: HOSPITAL | Age: 71
End: 2021-09-24

## 2021-09-24 DIAGNOSIS — I48.0 PAROXYSMAL ATRIAL FIBRILLATION (HCC): ICD-10-CM

## 2021-09-24 LAB
ANION GAP SERPL CALCULATED.3IONS-SCNC: 10 MMOL/L (ref 5–15)
BUN SERPL-MCNC: 13 MG/DL (ref 8–23)
BUN/CREAT SERPL: 11.7 (ref 7–25)
CALCIUM SPEC-SCNC: 9.7 MG/DL (ref 8.6–10.5)
CHLORIDE SERPL-SCNC: 101 MMOL/L (ref 98–107)
CO2 SERPL-SCNC: 28 MMOL/L (ref 22–29)
CREAT SERPL-MCNC: 1.11 MG/DL (ref 0.76–1.27)
DEPRECATED RDW RBC AUTO: 47.7 FL (ref 37–54)
ERYTHROCYTE [DISTWIDTH] IN BLOOD BY AUTOMATED COUNT: 14.3 % (ref 12.3–15.4)
GFR SERPL CREATININE-BSD FRML MDRD: 65 ML/MIN/1.73
GLUCOSE SERPL-MCNC: 159 MG/DL (ref 65–99)
HBA1C MFR BLD: 7.7 % (ref 4.8–5.6)
HCT VFR BLD AUTO: 47.8 % (ref 37.5–51)
HGB BLD-MCNC: 15.8 G/DL (ref 13–17.7)
INR PPP: 1.44 (ref 0.85–1.16)
MCH RBC QN AUTO: 30.1 PG (ref 26.6–33)
MCHC RBC AUTO-ENTMCNC: 33.1 G/DL (ref 31.5–35.7)
MCV RBC AUTO: 91 FL (ref 79–97)
PLATELET # BLD AUTO: 258 10*3/MM3 (ref 140–450)
PMV BLD AUTO: 9.5 FL (ref 6–12)
POTASSIUM SERPL-SCNC: 5.1 MMOL/L (ref 3.5–5.2)
PROTHROMBIN TIME: 17 SECONDS (ref 11.4–14.4)
RBC # BLD AUTO: 5.25 10*6/MM3 (ref 4.14–5.8)
SARS-COV-2 RNA PNL SPEC NAA+PROBE: NOT DETECTED
SODIUM SERPL-SCNC: 139 MMOL/L (ref 136–145)
WBC # BLD AUTO: 7.65 10*3/MM3 (ref 3.4–10.8)

## 2021-09-24 PROCEDURE — 85610 PROTHROMBIN TIME: CPT

## 2021-09-24 PROCEDURE — U0004 COV-19 TEST NON-CDC HGH THRU: HCPCS

## 2021-09-24 PROCEDURE — C9803 HOPD COVID-19 SPEC COLLECT: HCPCS

## 2021-09-24 PROCEDURE — 83036 HEMOGLOBIN GLYCOSYLATED A1C: CPT | Performed by: NURSE PRACTITIONER

## 2021-09-24 PROCEDURE — U0005 INFEC AGEN DETEC AMPLI PROBE: HCPCS

## 2021-09-24 PROCEDURE — 36415 COLL VENOUS BLD VENIPUNCTURE: CPT

## 2021-09-24 PROCEDURE — 85027 COMPLETE CBC AUTOMATED: CPT

## 2021-09-24 PROCEDURE — 80048 BASIC METABOLIC PNL TOTAL CA: CPT

## 2021-09-24 RX ORDER — MINOCYCLINE HYDROCHLORIDE 100 MG/1
100 CAPSULE ORAL DAILY
COMMUNITY

## 2021-09-26 ENCOUNTER — TELEPHONE (OUTPATIENT)
Dept: CARDIOLOGY | Facility: CLINIC | Age: 71
End: 2021-09-26

## 2021-09-26 DIAGNOSIS — I48.0 PAROXYSMAL ATRIAL FIBRILLATION (HCC): Primary | ICD-10-CM

## 2021-09-26 DIAGNOSIS — R79.1 SUPRATHERAPEUTIC INR: ICD-10-CM

## 2021-09-27 ENCOUNTER — HOSPITAL ENCOUNTER (OUTPATIENT)
Dept: CARDIOLOGY | Facility: HOSPITAL | Age: 71
Discharge: HOME | End: 2021-09-27

## 2021-09-27 ENCOUNTER — HOSPITAL ENCOUNTER (INPATIENT)
Facility: HOSPITAL | Age: 71
LOS: 1 days | Discharge: HOME | End: 2021-09-27
Attending: INTERNAL MEDICINE | Admitting: INTERNAL MEDICINE

## 2021-09-27 ENCOUNTER — ANESTHESIA (OUTPATIENT)
Dept: CARDIOLOGY | Facility: HOSPITAL | Age: 71
End: 2021-09-27

## 2021-09-27 ENCOUNTER — TELEPHONE (OUTPATIENT)
Dept: ENDOCRINOLOGY | Facility: CLINIC | Age: 71
End: 2021-09-27

## 2021-09-27 ENCOUNTER — ANESTHESIA EVENT (OUTPATIENT)
Dept: CARDIOLOGY | Facility: HOSPITAL | Age: 71
End: 2021-09-27

## 2021-09-27 VITALS — HEIGHT: 71 IN | WEIGHT: 309 LBS | BODY MASS INDEX: 43.26 KG/M2

## 2021-09-27 VITALS
DIASTOLIC BLOOD PRESSURE: 66 MMHG | BODY MASS INDEX: 43.26 KG/M2 | TEMPERATURE: 98.2 F | HEART RATE: 76 BPM | RESPIRATION RATE: 18 BRPM | OXYGEN SATURATION: 97 % | WEIGHT: 309 LBS | HEIGHT: 71 IN | SYSTOLIC BLOOD PRESSURE: 127 MMHG

## 2021-09-27 DIAGNOSIS — I48.0 PAROXYSMAL ATRIAL FIBRILLATION (HCC): ICD-10-CM

## 2021-09-27 DIAGNOSIS — R79.1 SUPRATHERAPEUTIC INR: ICD-10-CM

## 2021-09-27 DIAGNOSIS — Z86.73 HISTORY OF CVA (CEREBROVASCULAR ACCIDENT): ICD-10-CM

## 2021-09-27 LAB
ACT BLD: 131 SECONDS (ref 82–152)
ACT BLD: 345 SECONDS (ref 82–152)
BH CV ECHO MEAS - AO ROOT DIAM: 3.6 CM
BH CV ECHO MEAS - BSA(HAYCOCK): 2.7 M^2
BH CV ECHO MEAS - BSA: 2.5 M^2
BH CV ECHO MEAS - BZI_BMI: 43.3 KILOGRAMS/M^2
BH CV ECHO MEAS - BZI_METRIC_HEIGHT: 180 CM
BH CV ECHO MEAS - BZI_METRIC_WEIGHT: 140.2 KG
BH CV VAS BP LEFT ARM: NORMAL MMHG
GLUCOSE BLDC GLUCOMTR-MCNC: 113 MG/DL (ref 70–130)
GLUCOSE BLDC GLUCOMTR-MCNC: 128 MG/DL (ref 70–130)
GLUCOSE BLDC GLUCOMTR-MCNC: 143 MG/DL (ref 70–130)
INR PPP: 1.6 (ref 0.8–1.2)
MAXIMAL PREDICTED HEART RATE: 150 BPM
PROTHROMBIN TIME: 19.2 SECONDS (ref 12.8–15.2)
STRESS TARGET HR: 128 BPM

## 2021-09-27 PROCEDURE — 0 IOPAMIDOL PER 1 ML: Performed by: INTERNAL MEDICINE

## 2021-09-27 PROCEDURE — C1760 CLOSURE DEV, VASC: HCPCS | Performed by: INTERNAL MEDICINE

## 2021-09-27 PROCEDURE — 93799 UNLISTED CV SVC/PROCEDURE: CPT | Performed by: INTERNAL MEDICINE

## 2021-09-27 PROCEDURE — C1759 CATH, INTRA ECHOCARDIOGRAPHY: HCPCS | Performed by: INTERNAL MEDICINE

## 2021-09-27 PROCEDURE — C1769 GUIDE WIRE: HCPCS | Performed by: INTERNAL MEDICINE

## 2021-09-27 PROCEDURE — C1894 INTRO/SHEATH, NON-LASER: HCPCS | Performed by: INTERNAL MEDICINE

## 2021-09-27 PROCEDURE — 25010000002 VANCOMYCIN: Performed by: NURSE PRACTITIONER

## 2021-09-27 PROCEDURE — 33340 PERQ CLSR TCAT L ATR APNDGE: CPT | Performed by: INTERNAL MEDICINE

## 2021-09-27 PROCEDURE — 93355 ECHO TRANSESOPHAGEAL (TEE): CPT | Performed by: INTERNAL MEDICINE

## 2021-09-27 PROCEDURE — 85347 COAGULATION TIME ACTIVATED: CPT

## 2021-09-27 PROCEDURE — 82962 GLUCOSE BLOOD TEST: CPT

## 2021-09-27 PROCEDURE — 02L73DK OCCLUSION OF LEFT ATRIAL APPENDAGE WITH INTRALUMINAL DEVICE, PERCUTANEOUS APPROACH: ICD-10-PCS | Performed by: INTERNAL MEDICINE

## 2021-09-27 PROCEDURE — C1889 IMPLANT/INSERT DEVICE, NOC: HCPCS | Performed by: INTERNAL MEDICINE

## 2021-09-27 PROCEDURE — 25010000002 FENTANYL CITRATE (PF) 50 MCG/ML SOLUTION: Performed by: NURSE ANESTHETIST, CERTIFIED REGISTERED

## 2021-09-27 PROCEDURE — 25010000002 PROTAMINE SULFATE PER 10 MG: Performed by: INTERNAL MEDICINE

## 2021-09-27 PROCEDURE — B24BZZ4 ULTRASONOGRAPHY OF HEART WITH AORTA, TRANSESOPHAGEAL: ICD-10-PCS | Performed by: INTERNAL MEDICINE

## 2021-09-27 PROCEDURE — 25010000002 PROPOFOL 10 MG/ML EMULSION: Performed by: NURSE ANESTHETIST, CERTIFIED REGISTERED

## 2021-09-27 PROCEDURE — 25010000002 ONDANSETRON PER 1 MG: Performed by: INTERNAL MEDICINE

## 2021-09-27 PROCEDURE — 93355 ECHO TRANSESOPHAGEAL (TEE): CPT

## 2021-09-27 PROCEDURE — C1893 INTRO/SHEATH, FIXED,NON-PEEL: HCPCS | Performed by: INTERNAL MEDICINE

## 2021-09-27 PROCEDURE — 25010000002 DEXAMETHASONE PER 1 MG: Performed by: NURSE ANESTHETIST, CERTIFIED REGISTERED

## 2021-09-27 PROCEDURE — 25010000002 HEPARIN (PORCINE) PER 1000 UNITS: Performed by: INTERNAL MEDICINE

## 2021-09-27 PROCEDURE — B245ZZ3 ULTRASONOGRAPHY OF LEFT HEART, INTRAVASCULAR: ICD-10-PCS | Performed by: INTERNAL MEDICINE

## 2021-09-27 PROCEDURE — C1889 IMPLANT/INSERT DEVICE, NOC: HCPCS

## 2021-09-27 PROCEDURE — 85610 PROTHROMBIN TIME: CPT

## 2021-09-27 PROCEDURE — 25010000003 LIDOCAINE 1 % SOLUTION: Performed by: INTERNAL MEDICINE

## 2021-09-27 DEVICE — LEFT ATRIAL APPENDAGE CLOSURE DEVICE WITH DELIVERY SYSTEM
Type: IMPLANTABLE DEVICE | Status: FUNCTIONAL
Brand: WATCHMAN FLX™

## 2021-09-27 RX ORDER — FENTANYL CITRATE 50 UG/ML
INJECTION, SOLUTION INTRAMUSCULAR; INTRAVENOUS AS NEEDED
Status: DISCONTINUED | OUTPATIENT
Start: 2021-09-27 | End: 2021-09-27 | Stop reason: SURG

## 2021-09-27 RX ORDER — SODIUM CHLORIDE 9 MG/ML
1 INJECTION, SOLUTION INTRAVENOUS CONTINUOUS
Status: DISCONTINUED | OUTPATIENT
Start: 2021-09-27 | End: 2021-09-27 | Stop reason: HOSPADM

## 2021-09-27 RX ORDER — PROPOFOL 10 MG/ML
VIAL (ML) INTRAVENOUS AS NEEDED
Status: DISCONTINUED | OUTPATIENT
Start: 2021-09-27 | End: 2021-09-27 | Stop reason: SURG

## 2021-09-27 RX ORDER — ACETAMINOPHEN 325 MG/1
650 TABLET ORAL EVERY 4 HOURS PRN
Status: DISCONTINUED | OUTPATIENT
Start: 2021-09-27 | End: 2021-09-27 | Stop reason: HOSPADM

## 2021-09-27 RX ORDER — BUPIVACAINE HCL/0.9 % NACL/PF 0.125 %
PLASTIC BAG, INJECTION (ML) EPIDURAL AS NEEDED
Status: DISCONTINUED | OUTPATIENT
Start: 2021-09-27 | End: 2021-09-27 | Stop reason: SURG

## 2021-09-27 RX ORDER — SODIUM CHLORIDE 0.9 % (FLUSH) 0.9 %
10 SYRINGE (ML) INJECTION AS NEEDED
Status: DISCONTINUED | OUTPATIENT
Start: 2021-09-27 | End: 2021-09-27 | Stop reason: HOSPADM

## 2021-09-27 RX ORDER — ROCURONIUM BROMIDE 10 MG/ML
INJECTION, SOLUTION INTRAVENOUS AS NEEDED
Status: DISCONTINUED | OUTPATIENT
Start: 2021-09-27 | End: 2021-09-27 | Stop reason: SURG

## 2021-09-27 RX ORDER — PROTAMINE SULFATE 10 MG/ML
INJECTION, SOLUTION INTRAVENOUS AS NEEDED
Status: DISCONTINUED | OUTPATIENT
Start: 2021-09-27 | End: 2021-09-27 | Stop reason: HOSPADM

## 2021-09-27 RX ORDER — ONDANSETRON 2 MG/ML
4 INJECTION INTRAMUSCULAR; INTRAVENOUS EVERY 6 HOURS PRN
Status: DISCONTINUED | OUTPATIENT
Start: 2021-09-27 | End: 2021-09-27 | Stop reason: HOSPADM

## 2021-09-27 RX ORDER — SODIUM CHLORIDE, SODIUM LACTATE, POTASSIUM CHLORIDE, CALCIUM CHLORIDE 600; 310; 30; 20 MG/100ML; MG/100ML; MG/100ML; MG/100ML
INJECTION, SOLUTION INTRAVENOUS CONTINUOUS PRN
Status: DISCONTINUED | OUTPATIENT
Start: 2021-09-27 | End: 2021-09-27 | Stop reason: SURG

## 2021-09-27 RX ORDER — FENTANYL CITRATE 50 UG/ML
50 INJECTION, SOLUTION INTRAMUSCULAR; INTRAVENOUS
Status: DISCONTINUED | OUTPATIENT
Start: 2021-09-27 | End: 2021-09-27 | Stop reason: HOSPADM

## 2021-09-27 RX ORDER — SODIUM CHLORIDE 0.9 % (FLUSH) 0.9 %
3 SYRINGE (ML) INJECTION EVERY 12 HOURS SCHEDULED
Status: DISCONTINUED | OUTPATIENT
Start: 2021-09-27 | End: 2021-09-27 | Stop reason: HOSPADM

## 2021-09-27 RX ORDER — LIDOCAINE HYDROCHLORIDE 10 MG/ML
INJECTION, SOLUTION INFILTRATION; PERINEURAL AS NEEDED
Status: DISCONTINUED | OUTPATIENT
Start: 2021-09-27 | End: 2021-09-27 | Stop reason: HOSPADM

## 2021-09-27 RX ORDER — LIDOCAINE HYDROCHLORIDE 10 MG/ML
INJECTION, SOLUTION EPIDURAL; INFILTRATION; INTRACAUDAL; PERINEURAL AS NEEDED
Status: DISCONTINUED | OUTPATIENT
Start: 2021-09-27 | End: 2021-09-27 | Stop reason: SURG

## 2021-09-27 RX ORDER — ACETAMINOPHEN 650 MG/1
650 SUPPOSITORY RECTAL EVERY 4 HOURS PRN
Status: DISCONTINUED | OUTPATIENT
Start: 2021-09-27 | End: 2021-09-27 | Stop reason: HOSPADM

## 2021-09-27 RX ORDER — DEXAMETHASONE SODIUM PHOSPHATE 4 MG/ML
INJECTION, SOLUTION INTRA-ARTICULAR; INTRALESIONAL; INTRAMUSCULAR; INTRAVENOUS; SOFT TISSUE AS NEEDED
Status: DISCONTINUED | OUTPATIENT
Start: 2021-09-27 | End: 2021-09-27 | Stop reason: SURG

## 2021-09-27 RX ORDER — ASPIRIN 81 MG/1
81 TABLET ORAL DAILY
Qty: 90 TABLET | Refills: 1 | Status: SHIPPED | OUTPATIENT
Start: 2021-09-27 | End: 2022-04-19

## 2021-09-27 RX ORDER — POTASSIUM CHLORIDE 20 MEQ/1
20 TABLET, EXTENDED RELEASE ORAL DAILY
Status: ON HOLD | COMMUNITY
End: 2021-09-27 | Stop reason: SDUPTHER

## 2021-09-27 RX ORDER — POTASSIUM CHLORIDE 20 MEQ/1
20 TABLET, EXTENDED RELEASE ORAL DAILY PRN
Qty: 30 TABLET | Refills: 1 | Status: SHIPPED | OUTPATIENT
Start: 2021-09-27

## 2021-09-27 RX ORDER — HYDROMORPHONE HYDROCHLORIDE 1 MG/ML
0.5 INJECTION, SOLUTION INTRAMUSCULAR; INTRAVENOUS; SUBCUTANEOUS
Status: DISCONTINUED | OUTPATIENT
Start: 2021-09-27 | End: 2021-09-27 | Stop reason: HOSPADM

## 2021-09-27 RX ORDER — HEPARIN SODIUM 1000 [USP'U]/ML
INJECTION, SOLUTION INTRAVENOUS; SUBCUTANEOUS AS NEEDED
Status: DISCONTINUED | OUTPATIENT
Start: 2021-09-27 | End: 2021-09-27 | Stop reason: HOSPADM

## 2021-09-27 RX ADMIN — Medication 100 MCG: at 09:03

## 2021-09-27 RX ADMIN — VANCOMYCIN HYDROCHLORIDE 2000 MG: 10 INJECTION, POWDER, LYOPHILIZED, FOR SOLUTION INTRAVENOUS at 08:42

## 2021-09-27 RX ADMIN — DEXAMETHASONE SODIUM PHOSPHATE 8 MG: 4 INJECTION, SOLUTION INTRAMUSCULAR; INTRAVENOUS at 08:35

## 2021-09-27 RX ADMIN — SODIUM CHLORIDE 1 ML/KG/HR: 9 INJECTION, SOLUTION INTRAVENOUS at 07:36

## 2021-09-27 RX ADMIN — ONDANSETRON 4 MG: 2 INJECTION INTRAMUSCULAR; INTRAVENOUS at 12:26

## 2021-09-27 RX ADMIN — LIDOCAINE HYDROCHLORIDE 50 MG: 10 INJECTION, SOLUTION EPIDURAL; INFILTRATION; INTRACAUDAL; PERINEURAL at 08:31

## 2021-09-27 RX ADMIN — SODIUM CHLORIDE, POTASSIUM CHLORIDE, SODIUM LACTATE AND CALCIUM CHLORIDE: 600; 310; 30; 20 INJECTION, SOLUTION INTRAVENOUS at 08:22

## 2021-09-27 RX ADMIN — FENTANYL CITRATE 50 MCG: 50 INJECTION INTRAMUSCULAR; INTRAVENOUS at 10:43

## 2021-09-27 RX ADMIN — FENTANYL CITRATE 100 MCG: 50 INJECTION, SOLUTION INTRAMUSCULAR; INTRAVENOUS at 08:35

## 2021-09-27 RX ADMIN — ROCURONIUM BROMIDE 50 MG: 10 INJECTION INTRAVENOUS at 08:31

## 2021-09-27 RX ADMIN — PROPOFOL 200 MG: 10 INJECTION, EMULSION INTRAVENOUS at 08:31

## 2021-09-27 RX ADMIN — FENTANYL CITRATE 50 MCG: 50 INJECTION INTRAMUSCULAR; INTRAVENOUS at 10:19

## 2021-09-27 NOTE — TELEPHONE ENCOUNTER
----- Message from Misael Vidales MD sent at 9/27/2021  7:46 AM EDT -----      ----- Message -----  From: Darcy Avila  Sent: 9/24/2021  10:23 AM EDT  To: Misael Vidales MD

## 2021-09-27 NOTE — ANESTHESIA PROCEDURE NOTES
Airway  Urgency: elective    Date/Time: 9/27/2021 8:31 AM  Airway not difficult    General Information and Staff    Patient location during procedure: OR  CRNA: Renzo Bernal CRNA    Indications and Patient Condition  Indications for airway management: airway protection    Preoxygenated: yes  MILS not maintained throughout  Mask difficulty assessment: 1 - vent by mask    Final Airway Details  Final airway type: endotracheal airway      Successful airway: ETT  Cuffed: yes   Successful intubation technique: direct laryngoscopy  Facilitating devices/methods: intubating stylet  Endotracheal tube insertion site: oral  Blade: Gwyn  Blade size: 4  ETT size (mm): 7.5  Cormack-Lehane Classification: grade I - full view of glottis  Placement verified by: chest auscultation and capnometry   Measured from: lips  ETT/EBT  to lips (cm): 20  Number of attempts at approach: 1  Assessment: lips, teeth, and gum same as pre-op and atraumatic intubation    Additional Comments  Negative epigastric sounds, Breath sound equal bilaterally with symmetric chest rise and fall

## 2021-09-27 NOTE — TELEPHONE ENCOUNTER
Pre watchman information reviewed.  VINI Vergara DNS warfarin.  INR 1.44 9/24.  INR pending for 9/27 pre op.  Left detailed message with instructions at patients request.    Olena Barcenas RN

## 2021-09-27 NOTE — ANESTHESIA PREPROCEDURE EVALUATION
Anesthesia Evaluation     Patient summary reviewed and Nursing notes reviewed                Airway   Mallampati: II  TM distance: >3 FB  Neck ROM: full  No difficulty expected  Dental - normal exam   (+) poor dentition    Pulmonary - normal exam   (+) sleep apnea (BiPAP),   Cardiovascular - normal exam    (+) pacemaker pacemaker, hypertension, CAD, CABG (x2 1/16) >6 Months, cardiac stents more than 12 months ago dysrhythmias Paroxysmal Atrial Fib, hyperlipidemia,     ROS comment:   Echo 10/19:  normal EF, no significant valve disease    Stress test 10/19:  No ischemia, wnl    Neuro/Psych  (+) CVA (some speach disturbance),     GI/Hepatic/Renal/Endo    (+) morbid obesity,  diabetes mellitus,     Musculoskeletal     Abdominal  - normal exam    Bowel sounds: normal.   Substance History - negative use     OB/GYN negative ob/gyn ROS         Other   arthritis,                    Anesthesia Plan    ASA 3     general     intravenous induction     Anesthetic plan, all risks, benefits, and alternatives have been provided, discussed and informed consent has been obtained with: patient.    Plan discussed with CRNA.

## 2021-09-27 NOTE — ANESTHESIA POSTPROCEDURE EVALUATION
"Patient: Walter Herrera    Procedure Summary     Date: 09/27/21 Room / Location: [facility] / [facility]VASIVE LOCATION    Anesthesia Start: 0819 Anesthesia Stop:     Procedure: Atrial Appendage Occlusion (9/27/21) on Warfarin DNS (N/A ) Diagnosis:       Paroxysmal atrial fibrillation (HCC)      Supratherapeutic INR      (Atrial Fibrillation)    Providers: Derek Vergara MD Provider: Moris Francis MD    Anesthesia Type: general ASA Status: 3          Anesthesia Type: general    Vitals  No vitals data found for the desired time range.          Post Anesthesia Care and Evaluation    Patient location during evaluation: PACU  Patient participation: complete - patient participated  Level of consciousness: awake and responsive to verbal stimuli  Pain score: 2  Pain management: adequate  Airway patency: patent  Anesthetic complications: No anesthetic complications    Cardiovascular status: acceptable  Respiratory status: acceptable  Hydration status: acceptable    Comments: Pt awake and responsive. SV. VSS. Report to RN. Patient Vitals in the past 24 hrs:  09/27/21 0630, BP:158/82, Temp:97.3 °F (36.3 °C), Temp src:Temporal, Pulse:75, Resp:16, SpO2:97 %, Height:180.3 cm (71\"), Weight:(!) 140 kg (309 lb)  133/78. p 72. r 16. t 98.1                  "

## 2021-09-30 ENCOUNTER — TELEPHONE (OUTPATIENT)
Dept: PHARMACY | Facility: HOSPITAL | Age: 71
End: 2021-09-30

## 2021-09-30 NOTE — TELEPHONE ENCOUNTER
Mr. Herrera is a new referral from HAMILTON Joiner, post Watchman procedure. He estimates prior to Watchman he had been on warfarin a little over a year for paroxysmal afib and hx CVA. His current dosing is warfarin 10 mg daily except 12.5 mg on TuesThursSat.    He is coming to clinic prior to [facility] appt on Monday, 10/4 at 1330.

## 2021-10-01 ENCOUNTER — TELEPHONE (OUTPATIENT)
Dept: CARDIOLOGY | Facility: CLINIC | Age: 71
End: 2021-10-01

## 2021-10-01 NOTE — TELEPHONE ENCOUNTER
"S/P Watchman 9/27/21.    Pt phoned and states he has had afib since he woke up.  Some shortness of breath and fatigue-\"I just dont feel right.\"  Denies weakness, unusual slurred speech or CVA symptoms.  NAD.  /92, HR 77.  He states he took his BP meds today.  Dr Claudio interrogates his PM.  H/O CVA in the setting of sub theraputic INR.  Pts INR was 1.6 on 9/27 and ordered for recheck 9/30/21.  Pt did not have INR rechecked yesterday.  He states he was planning to check it on Monday.      Discussed situation with HAMILTON Craft.  She rec'd ED for eval.  Pt verbalized understanding.  Report called to [facility] [phone].  Copy of our discharge summary faxed to [phone]    "

## 2021-10-04 ENCOUNTER — OFFICE VISIT (OUTPATIENT)
Dept: CARDIOLOGY | Facility: HOSPITAL | Age: 71
End: 2021-10-04

## 2021-10-04 ENCOUNTER — ANTICOAGULATION VISIT (OUTPATIENT)
Dept: PHARMACY | Facility: HOSPITAL | Age: 71
End: 2021-10-04

## 2021-10-04 VITALS
RESPIRATION RATE: 18 BRPM | SYSTOLIC BLOOD PRESSURE: 151 MMHG | BODY MASS INDEX: 43.28 KG/M2 | WEIGHT: 309.13 LBS | OXYGEN SATURATION: 95 % | TEMPERATURE: 97.1 F | DIASTOLIC BLOOD PRESSURE: 62 MMHG | HEIGHT: 71 IN | HEART RATE: 75 BPM

## 2021-10-04 DIAGNOSIS — I48.0 PAROXYSMAL ATRIAL FIBRILLATION (HCC): Primary | ICD-10-CM

## 2021-10-04 DIAGNOSIS — E78.2 MIXED HYPERLIPIDEMIA: ICD-10-CM

## 2021-10-04 DIAGNOSIS — I10 ESSENTIAL HYPERTENSION: ICD-10-CM

## 2021-10-04 DIAGNOSIS — R79.1 SUPRATHERAPEUTIC INR: ICD-10-CM

## 2021-10-04 LAB — INR PPP: 2.3

## 2021-10-04 PROCEDURE — 99214 OFFICE O/P EST MOD 30 MIN: CPT | Performed by: NURSE PRACTITIONER

## 2021-10-04 PROCEDURE — G0463 HOSPITAL OUTPT CLINIC VISIT: HCPCS

## 2021-10-04 RX ORDER — NITROGLYCERIN 0.4 MG/1
TABLET SUBLINGUAL
Qty: 100 TABLET | Refills: 11 | Status: SHIPPED | OUTPATIENT
Start: 2021-10-04

## 2021-10-04 NOTE — PROGRESS NOTES
Anticoagulation Clinic - Remote Progress Note  REMOTE LAB    Indication: paroxysmal A. Fib (s/p Watchman 9/27/2021) and hx CVA (6/2020)  Referring Provider: HAMILTON Joiner  Initial Warfarin Start Date:   Goal INR: 2-3   Current Drug Interactions: Asa,  [id number]: 6 (stroke, Age, HTN, DM; MI)  Bleed Risk: HASBLED - 3  Other: Was on Eliquis, but switched to warfarin due to costs   At discharge: Warfarin 10 mg MWFSun, 12.5 mg Tues,Sat,Thurs    Diet: iceburg lettuce, green beans, peas (advised to keep consistent)   Alcohol: None  Tobacco: Former smoker  OTC Pain Medication:    INR History:  Date 10/4         Total Weekly Dose 77.5mg         INR 2.3         Notes            Phone Interview:  Tablet Strength: 5mg  Patient Contact Info: [phone];   Estimated OOP cost:  likely ~$0  Verbal Release Auth: OK to [facility] - he verbalized ok to speak with Torito Herrera his emergency contact ([phone]) if needed.  Lab Contact Info: [facility]    Patient Findings:  Positives:  Emergency department visit   Negatives:  Signs/symptoms of thrombosis, Signs/symptoms of bleeding, Laboratory test error suspected, Change in health, Change in alcohol use, Change in activity, Upcoming invasive procedure, Upcoming dental procedure, Missed doses, Extra doses, Change in medications, Change in diet/appetite, Hospital admission, Bruising, Other complaints   Comments:  Mr. Herrera is 7 days post watchman placement. Mr. Herrera went to [facility] ED due to Afib. He couldn't breathe well. He took Bumex. Anticoagulation therapy was previously managed by Dr. Michael Mendiola. Pt reports history of Heart attack 2015. Not noted in Epic at this time.     Spoke with Sandy recent INR results. Discussed he now plans to go to [facility] to have his INR drawn: 8/18- 3.3 ; 7/30-1.7; 6/30- 1.8; 6/1- 2.7. Mr. Santillan will call the clinic the following day if he had not heard from us. He voices understanding that we will call him  when we receive his result.     Per Dr. Vergara: As part of a multidisciplinary plan of care and shared decision making model watchman implant has also been discussed with his primary care doctor, Dr. Mendiola who agrees watchman placement is the patient's best interest.     Welcomed Walter Matthieu Herrera to the [facility]. I introduced myself and discussed that we will assist with his warfarin monitoring and work with his cardiologist or other physicians to provide patient care. Encouraged patient to call the clinic with requests for warfarin refills, changes in medications / diet, change in health, or upcoming procedures / surgeries. Discussed signs and symptoms of bleeding, signs and symptoms of TIA / CVA, use of OTC pain medications, and alcohol / tobacco / dietary / other drug interactions in relation to warfarin. Explained that he will be testing his INR more frequently in these first few weeks to ensure remains WNL with recent watchman device placement. Once that is achieved, pt will follow up at remote lab every 4 weeks, on average. Furthermore, we discussed available dates to come to the [facility] for face to face meetings. Patient was agreeable to meeting twice per year. At this time, Walter Herrera did not have further questions or concerns. Provided patient with the clinic's contact information for future reference.     Plan:  1. INR is therapeutic today. Instructed pt to continue warfarin dosing of 10mg daily except 12.5mg TueThurSat.  2. Repeat INR in one week to ensure WNL.  3. Sent with standing order to test INR at [facility].  4. Pt declines warfarin refills.  5. Verbal information provided over the phone to Walter Herrera. Walter Herrera  expresses understanding by teach back, RBV dosing instructions, and has no further questions at this time.    Meron Law, PharmD  10/4/2021  09:26 EDT

## 2021-10-05 NOTE — PROGRESS NOTES
"Chief Complaint  Establish Care and Atrial Fibrillation    Subjective    History of Present Illness {CC  Problem List  Visit  Diagnosis   Encounters  Notes  Medications  Labs  Result Review Imaging  Media :23}       History of Present Illness   70-year-old man presents the office today for ongoing evaluation of his paroxysmal atrial fibrillation.  Patient underwent a watchman implantation per Dr. Vergara on 9/27/2021.  He reports that he did not feel well on 10/1/2021 presented to [facility].  He reports upon presentation to the ED he was in A. fib and converted while in the ER.  He reports that he is feeling great.  Currently denies chest pain, dyspnea, palpitations, presyncope, syncope, orthopnea, PND or pedal edema.  Objective     Vital Signs:   Vitals:    10/04/21 1454 10/04/21 1457 10/04/21 1458   BP: 148/72 162/69 151/62   BP Location: Right arm Left arm Left arm   Patient Position: Sitting Standing Sitting   Cuff Size: Large Adult Large Adult Large Adult   Pulse: 75 77 75   Resp:   18   Temp:  97.1 °F (36.2 °C) 97.1 °F (36.2 °C)   TempSrc:  Temporal Temporal   SpO2: 95% 94% 95%   Weight:   (!) 140 kg (309 lb 2 oz)   Height:   180.3 cm (71\")     Body mass index is 43.11 kg/m².  Physical Exam  Vitals and nursing note reviewed.   Constitutional:       Appearance: Normal appearance.   HENT:      Head: Normocephalic.   Eyes:      Pupils: Pupils are equal, round, and reactive to light.   Cardiovascular:      Rate and Rhythm: Normal rate and regular rhythm.      Pulses: Normal pulses.      Heart sounds: Normal heart sounds. No murmur heard.     Pulmonary:      Effort: Pulmonary effort is normal.      Breath sounds: Normal breath sounds.   Abdominal:      General: Bowel sounds are normal.      Palpations: Abdomen is soft.   Musculoskeletal:         General: Normal range of motion.      Cervical back: Normal range of motion.      Right lower leg: No edema.      Left lower leg: No edema. "   Skin:     General: Skin is warm and dry.      Capillary Refill: Capillary refill takes less than 2 seconds.   Neurological:      Mental Status: He is alert and oriented to person, place, and time.   Psychiatric:         Mood and Affect: Mood normal.         Thought Content: Thought content normal.              Result Review  Data Reviewed:{ Labs  Result Review  Imaging  Med Tab  Media :23}     I have reviewed records from [facility]: Sodium 140, potassium 4.3, chloride 104, carbon oxide 29, glucose 105, BUN 16, creatinine 1, GFR 79, total protein 6.8, albumin 4.2, calcium 9.8, total bilirubin 1.0, AST 38, ALT 39, ALP 62, WBC 8.35, RBC 5.16, hemoglobin 15.8, hematocrit 46.5, platelets 241 INR 1.8  CT angio of chest no evidence of pulmonary emboli.  No evidence of thoracic aortic dissection.  No acute intrathoracic findings.  Evidence of prior granulomatous disease  Chest x-ray: Low lung volumes normal heart size with calcified granuloma right lung base with no focal consolidation  EKG atrial paced with prolonged AV conduction with frequent V paced complexes, bifascicular block at 97 bpm           Assessment and Plan {CC Problem List  Visit Diagnosis  ROS  Review (Popup)  Health Maintenance  Quality  BestPractice  Medications  SmartSets  SnapShot Encounters  Media :23}   1. Paroxysmal atrial fibrillation (HCC)  S/p watchman 9/27/2021    2. Essential hypertension  Well controlled     3. Supratherapeutic INR  inr with coumadin clinic today prior to this appt  inr now within range     4. Mixed hyperlipidemia  Statin         Follow Up {Instructions Charge Capture  Follow-up Communications :23}   Return if symptoms worsen or fail to improve.    Patient was given instructions and counseling regarding his condition or for health maintenance advice. Please see specific information pulled into the AVS if appropriate.  Patient was instructed to call the [facility] with any  questions, concerns, or worsening symptoms.    *Please note that portions of this note were completed with a voice recognition program. Efforts were made to edit the dictations, but occasionally words are mistranscribed.

## 2021-10-15 ENCOUNTER — ANTICOAGULATION VISIT (OUTPATIENT)
Dept: PHARMACY | Facility: HOSPITAL | Age: 71
End: 2021-10-15

## 2021-10-15 DIAGNOSIS — I48.0 PAROXYSMAL ATRIAL FIBRILLATION (HCC): Primary | ICD-10-CM

## 2021-10-15 LAB — INR PPP: 1.9

## 2021-10-15 NOTE — PROGRESS NOTES
Anticoagulation Clinic - Remote Progress Note  REMOTE LAB    Indication: paroxysmal A. Fib (s/p Watchman 9/27/2021) and hx CVA (6/2020)  Referring Provider: HAMILTON Joiner  Initial Warfarin Start Date:   Goal INR: 2-3   Current Drug Interactions: Asa,  [id number]: 6 (stroke, Age, HTN, DM; MI)  Bleed Risk: HASBLED - 3  Other: Was on Eliquis, but switched to warfarin due to costs   At discharge: Warfarin 10 mg MWFSun, 12.5 mg Tues,Sat,Thurs    Diet: iceburg lettuce, green beans, peas (advised to keep consistent)   Alcohol: None  Tobacco: Former smoker  OTC Pain Medication:    INR History:  Date 10/4 10/14        Total Weekly Dose 77.5mg 77.5mg        INR 2.3 1.9        Notes            Phone Interview:  Tablet Strength: 5mg  Patient Contact Info: [phone];   Estimated OOP cost:  likely ~$0  Verbal Release Auth: OK to [facility] - he verbalized ok to speak with Torito Herrera his emergency contact ([phone]) if needed.  Lab Contact Info: [facility] [phone]    Patient Findings  Negatives:  Signs/symptoms of thrombosis, Signs/symptoms of bleeding, Laboratory test error suspected, Change in health, Change in alcohol use, Change in activity, Upcoming invasive procedure, Emergency department visit, Upcoming dental procedure, Missed doses, Extra doses, Change in medications, Change in diet/appetite, Hospital admission, Bruising, Other complaints     Plan:  1. INR is slightly SUBtherapeutic today. Instructed pt to boost dose to 12.5mg tonight and then increase his dose to 12.5mg daily except 10mg SunWedFri. Will need to discuss dosing day by day.   2. Repeat INR in one week to ensure WNL.  3. Sent with standing order to test INR at [facility].  4. Pt declines warfarin refills.  5. Verbal information provided over the phone to Walter Herrera. Walter Herrera  expresses understanding by teach back, RBV dosing instructions, and has no further questions at this time.    Meron Law,  PharmD  10/15/2021  15:47 EDT

## 2021-10-18 ENCOUNTER — ANTICOAGULATION VISIT (OUTPATIENT)
Dept: PHARMACY | Facility: HOSPITAL | Age: 71
End: 2021-10-18

## 2021-10-18 DIAGNOSIS — I48.0 PAROXYSMAL ATRIAL FIBRILLATION (HCC): Primary | ICD-10-CM

## 2021-10-18 LAB — INR PPP: 1.4

## 2021-10-18 RX ORDER — WARFARIN SODIUM 5 MG/1
TABLET ORAL
Qty: 90 TABLET | Refills: 0 | Status: SHIPPED | OUTPATIENT
Start: 2021-10-18 | End: 2021-11-10

## 2021-10-18 NOTE — PROGRESS NOTES
Anticoagulation Clinic - Remote Progress Note  REMOTE LAB    Indication: paroxysmal A. Fib (s/p Watchman 9/27/2021) and hx CVA (6/2020)  Referring Provider: HAMILTON Joienr  Initial Warfarin Start Date:   Goal INR: 2-3   Current Drug Interactions: Asa,  [id number]: 6 (stroke, Age, HTN, DM; MI)  Bleed Risk: HASBLED - 3  Other: Was on Eliquis, but switched to warfarin due to costs   At discharge: Warfarin 10 mg MWFSun, 12.5 mg Tues,Sat,Thurs    Diet: iceburg lettuce, green beans, peas (advised to keep consistent)   Alcohol: None  Tobacco: Former smoker  OTC Pain Medication:    INR History:  Date 10/4 10/14 10/18       Total Weekly Dose 77.5mg 77.5mg 80mg       INR 2.3 1.9 1.4       Notes            Phone Interview:  Tablet Strength: 5mg  Patient Contact Info: [phone];   Estimated OOP cost:  likely ~$0  Verbal Release Auth: OK to [facility] - he verbalized ok to speak with Torito Herrera his emergency contact ([phone]) if needed.  Lab Contact Info: [facility] [phone]    Patient Findings  Negatives:  Signs/symptoms of thrombosis, Signs/symptoms of bleeding, Laboratory test error suspected, Change in health, Change in alcohol use, Change in activity, Upcoming invasive procedure, Emergency department visit, Upcoming dental procedure, Missed doses, Extra doses, Change in medications, Change in diet/appetite, Hospital admission, Bruising, Other complaints   Comments:  Denies all likely causes of decreased INR         Plan:  1. INR is SUBtherapeutic today at 1.4. Significant decrease from previous encounter despite increased dose. Instructed pt to boost dose to warfarin 15mg x2 nights increase his dose to 12.5mg daily UNTIL RECHECK Will need to discuss dosing day by day.   2. Repeat INR 10/21   3. Pt requests warfarin refills.  5. Verbal information provided over the phone to Walter Herrera. Walter Herrera  expresses understanding by teach back, RBV dosing instructions, and has no further questions at  this time.    Maryam Huffman, GretchenD.  10/18/21   10:49 EDT

## 2021-10-21 ENCOUNTER — ANTICOAGULATION VISIT (OUTPATIENT)
Dept: PHARMACY | Facility: HOSPITAL | Age: 71
End: 2021-10-21

## 2021-10-21 DIAGNOSIS — I48.0 PAROXYSMAL ATRIAL FIBRILLATION (HCC): Primary | ICD-10-CM

## 2021-10-21 LAB — INR PPP: 1

## 2021-10-21 NOTE — PROGRESS NOTES
Anticoagulation Clinic - Remote Progress Note  REMOTE LAB    Indication: paroxysmal A. Fib (s/p Watchman 9/27/2021) and hx CVA (6/2020)  Referring Provider: HAMILTON Joiner  Initial Warfarin Start Date:   Goal INR: 2-3   Current Drug Interactions: Asa,  [id number]: 6 (stroke, Age, HTN, DM; MI)  Bleed Risk: HASBLED - 3  Other: Was on Eliquis, but switched to warfarin due to costs   At discharge: Warfarin 10 mg MWFSun, 12.5 mg Tues,Sat,Thurs    Diet: iceburg lettuce, green beans, peas (advised to keep consistent)   Alcohol: None  Tobacco: Former smoker  OTC Pain Medication:    INR History:  Date 10/4 10/14 10/18 10/21      Total Weekly Dose 77.5mg 77.5mg 80mg 90mg????      INR 2.3 1.9 1.4 1.0      Notes            Phone Interview:  Tablet Strength: 5mg  Patient Contact Info: [phone];   Estimated OOP cost:  likely ~$0  Verbal Release Auth: OK to [facility] - he verbalized ok to speak with Torito Herrera his emergency contact ([phone]) if needed.  Lab Contact Info: [facility] [phone]    Patient Findings    Negatives:  Signs/symptoms of thrombosis, Signs/symptoms of bleeding, Laboratory test error suspected, Change in health, Change in alcohol use, Change in activity, Upcoming invasive procedure, Emergency department visit, Upcoming dental procedure, Missed doses, Extra doses, Change in medications, Change in diet/appetite, Hospital admission, Bruising, Other complaints   Comments:  Verified peach colored 5mg warfarin tablets on hand with W5 marking on tablet. Verified he takes THREE tablets for 15mg dose and TWO AND ONE HALF tablets for 12.5mg dose.  ZERO GLV. Denies missed doses        Plan:  1. INR is SUBtherapeutic/baseline today at 1.0. Significant decrease from previous encounter despite increased dose. Unable to determine what is causing baseline INR. Instructed pt to boost dose to warfarin 15mg x3 nights increase his dose to 12.5mg daily UNTIL RECHECK Will need to discuss dosing day by day.   2.  Repeat INR 10/25  3. Pt declines warfarin refills.  5. Verbal information provided over the phone to Walter Herrera. Walter Herrera  expresses understanding by teach back, RBV dosing instructions, and has no further questions at this time.    Maryam Huffman, GretchenD.  10/21/21   16:04 EDT

## 2021-10-26 ENCOUNTER — ANTICOAGULATION VISIT (OUTPATIENT)
Dept: PHARMACY | Facility: HOSPITAL | Age: 71
End: 2021-10-26

## 2021-10-26 DIAGNOSIS — I48.0 PAROXYSMAL ATRIAL FIBRILLATION (HCC): Primary | ICD-10-CM

## 2021-10-26 LAB — INR PPP: 1.8

## 2021-10-26 RX ORDER — BLOOD SUGAR DIAGNOSTIC
STRIP MISCELLANEOUS
Qty: 200 EACH | Refills: 1 | Status: SHIPPED | OUTPATIENT
Start: 2021-10-26 | End: 2021-10-27 | Stop reason: SDUPTHER

## 2021-10-26 NOTE — PROGRESS NOTES
Anticoagulation Clinic - Remote Progress Note  REMOTE LAB    Indication: paroxysmal A. Fib (s/p Watchman 9/27/2021) and hx CVA (6/2020)  Referring Provider: HAMILTON Joiner  Initial Warfarin Start Date:   Goal INR: 2-3   Current Drug Interactions: Asa,  [id number]: 6 (stroke, Age, HTN, DM; MI)  Bleed Risk: HASBLED - 3  Other: Was on Eliquis, but switched to warfarin due to costs   At discharge: Warfarin 10 mg MWFSun, 12.5 mg Tues,Sat,Thurs    Diet: iceburg lettuce, green beans, peas (advised to keep consistent)   Alcohol: None  Tobacco: Former smoker  OTC Pain Medication:    INR History:  Date 10/4 10/14 10/18 10/21 10/25     Total Weekly Dose 77.5mg 77.5mg 80mg 90mg???? 100mg     INR 2.3 1.9 1.4 1.0 1.8     Notes     rec 10/26       Phone Interview:  Tablet Strength: 5mg  Patient Contact Info: [phone];   Estimated OOP cost:  likely ~$0  Verbal Release Auth: OK to [facility] - he verbalized ok to speak with Torito Herrera his emergency contact ([phone]) if needed.  Lab Contact Info: [facility] [phone]    Patient Findings  Negatives:  Signs/symptoms of thrombosis, Signs/symptoms of bleeding, Laboratory test error suspected, Change in health, Change in alcohol use, Change in activity, Upcoming invasive procedure, Emergency department visit, Upcoming dental procedure, Missed doses, Extra doses, Change in medications, Change in diet/appetite, Hospital admission, Bruising, Other complaints   Comments:  Only GLV patient has had in weeks is green beans. Unable to determine cause of increased dosing needs           Plan:  1. INR is SUBtherapeutic at 1.8 10/25 but improved. Unable to determine cause of increased dosing needs. Instructed pt to take warfarin 12.5mg 10/26 and 15mg 10/27.   2. Repeat INR 10/28  3. Pt declines warfarin refills.  5. Verbal information provided over the phone to Waltre Herrera. Walter Herrera  expresses understanding by teach back, RBV dosing instructions, and has no further  questions at this time.      Gretchen PatiñoD.  10/27/21   09:02 EDT

## 2021-10-26 NOTE — TELEPHONE ENCOUNTER
PT CALLED REQUESTING REFILLS OF ONE TOUCH ULTRA TEST STRIPS TO BE SENT IN TO [facility] IN [place], [place]. PLEASE AND THANK YOU

## 2021-10-27 RX ORDER — BLOOD SUGAR DIAGNOSTIC
STRIP MISCELLANEOUS
Qty: 200 EACH | Refills: 1 | Status: SHIPPED | OUTPATIENT
Start: 2021-10-27

## 2021-10-28 ENCOUNTER — ANTICOAGULATION VISIT (OUTPATIENT)
Dept: PHARMACY | Facility: HOSPITAL | Age: 71
End: 2021-10-28

## 2021-10-28 DIAGNOSIS — I48.0 PAROXYSMAL ATRIAL FIBRILLATION (HCC): Primary | ICD-10-CM

## 2021-10-28 LAB — INR PPP: 2.5

## 2021-10-28 NOTE — PROGRESS NOTES
Anticoagulation Clinic - Remote Progress Note  REMOTE LAB    Indication: paroxysmal A. Fib (s/p Watchman 9/27/2021) and hx CVA (6/2020)  Referring Provider: HAMILTON Joiner  Initial Warfarin Start Date:   Goal INR: 2-3   Current Drug Interactions: Asa,  [id number]: 6 (stroke, Age, HTN, DM; MI)  Bleed Risk: HASBLED - 3  Other: Was on Eliquis, but switched to warfarin due to costs   At discharge: Warfarin 10 mg MWFSun, 12.5 mg Tues,Sat,Thurs    Diet: iceburg lettuce, green beans, peas (advised to keep consistent)   Alcohol: None  Tobacco: Former smoker  OTC Pain Medication:    INR History:  Date 10/4 10/14 10/18 10/21 10/25 10/28    Total Weekly Dose 77.5mg 77.5mg 80mg 90mg???? 100mg 97.5mg    INR 2.3 1.9 1.4 1.0 1.8 2.5    Notes     rec 10/26       Phone Interview:  Tablet Strength: 5mg  Patient Contact Info: [phone];   Estimated OOP cost:  likely ~$0  Verbal Release Auth: OK to [facility] - he verbalized ok to speak with Torito Herrera his emergency contact ([phone]) if needed.  Lab Contact Info: [facility] [phone]    Patient Findings      Negatives:  Signs/symptoms of thrombosis, Signs/symptoms of bleeding, Laboratory test error suspected, Change in health, Change in alcohol use, Change in activity, Upcoming invasive procedure, Emergency department visit, Upcoming dental procedure, Missed doses, Extra doses, Change in medications, Change in diet/appetite, Hospital admission, Bruising, Other complaints   Comments:  Post- watchman RAMÓN is not yet scheduled           Plan:  1. INR is therapeutic at 2.5. Unable to determine cause of increased dosing needs. Instructed pt to take warfarin 12.5mg until recheck   2. Repeat INR   3. Pt declines warfarin refills.  5. Verbal information provided over the phone to Walter Herrera. Walter Herrera  expresses understanding by teach back, RBV dosing instructions, and has no further questions at this time.      Maryam Huffman, PharmD.  10/28/21   13:22  EDT

## 2021-11-01 ENCOUNTER — ANTICOAGULATION VISIT (OUTPATIENT)
Dept: PHARMACY | Facility: HOSPITAL | Age: 71
End: 2021-11-01

## 2021-11-01 DIAGNOSIS — I48.0 PAROXYSMAL ATRIAL FIBRILLATION (HCC): Primary | ICD-10-CM

## 2021-11-01 LAB — INR PPP: 3.2

## 2021-11-01 NOTE — PROGRESS NOTES
Anticoagulation Clinic - Remote Progress Note  REMOTE LAB    Indication: paroxysmal A. Fib (s/p Watchman 9/27/2021) and hx CVA (6/2020)  Referring Provider: HAMILTON Joiner  Initial Warfarin Start Date:   Goal INR: 2-3   Current Drug Interactions: Asa,  [id number]: 6 (stroke, Age, HTN, DM; MI)  Bleed Risk: HASBLED - 3  Other: Was on Eliquis, but switched to warfarin due to costs   At discharge: Warfarin 10 mg MWFSun, 12.5 mg Tues,Sat,Thurs    Diet: iceburg lettuce, green beans, peas (advised to keep consistent)   Alcohol: None  Tobacco: Former smoker  OTC Pain Medication:    INR History:  Date 10/4 10/14 10/18 10/21 10/25 10/28 11/1   Total Weekly Dose 77.5mg 77.5mg 80mg 90mg???? 100mg 97.5mg 90mg   INR 2.3 1.9 1.4 1.0 1.8 2.5 3.2   Notes     rec 10/26       Phone Interview:  Tablet Strength: 5mg  Patient Contact Info: [phone];   Estimated OOP cost:  likely ~$0  Verbal Release Auth: OK to [facility] - he verbalized ok to speak with Torito Herrera his emergency contact ([phone]) if needed.  Lab Contact Info: [facility] [phone]    Patient Findings        Negatives:  Signs/symptoms of thrombosis, Signs/symptoms of bleeding, Laboratory test error suspected, Change in health, Change in alcohol use, Change in activity, Upcoming invasive procedure, Emergency department visit, Upcoming dental procedure, Missed doses, Extra doses, Change in medications, Change in diet/appetite, Hospital admission, Bruising, Other complaints         Plan:  1. INR is SUPRAtherapeutic at 3.2, was previously baseline on this dose.  Instructed pt to take warfarin 12.5mg daily except 10mg MonWed until recheck   2. Repeat INR 11/5  3. Pt declines warfarin refills.  4. Verbal information provided over the phone to Walter Herrera. Walter Herrera  expresses understanding by teach back, RBV dosing instructions, and has no further questions at this time.    Maryam Huffman, GretchenD.  11/01/21   11:45 EDT

## 2021-11-04 ENCOUNTER — PREP FOR SURGERY (OUTPATIENT)
Dept: OTHER | Facility: HOSPITAL | Age: 71
End: 2021-11-04

## 2021-11-04 DIAGNOSIS — I48.0 PAROXYSMAL ATRIAL FIBRILLATION (HCC): Primary | ICD-10-CM

## 2021-11-04 RX ORDER — SODIUM CHLORIDE 0.9 % (FLUSH) 0.9 %
3 SYRINGE (ML) INJECTION EVERY 12 HOURS SCHEDULED
Status: CANCELLED | OUTPATIENT
Start: 2021-11-04

## 2021-11-04 RX ORDER — SODIUM CHLORIDE 0.9 % (FLUSH) 0.9 %
10 SYRINGE (ML) INJECTION AS NEEDED
Status: CANCELLED | OUTPATIENT
Start: 2021-11-04

## 2021-11-05 ENCOUNTER — LAB (OUTPATIENT)
Dept: LAB | Facility: HOSPITAL | Age: 71
End: 2021-11-05

## 2021-11-05 ENCOUNTER — ANTICOAGULATION VISIT (OUTPATIENT)
Dept: PHARMACY | Facility: HOSPITAL | Age: 71
End: 2021-11-05

## 2021-11-05 ENCOUNTER — OFFICE VISIT (OUTPATIENT)
Dept: ENDOCRINOLOGY | Facility: CLINIC | Age: 71
End: 2021-11-05

## 2021-11-05 VITALS
OXYGEN SATURATION: 97 % | HEIGHT: 71 IN | BODY MASS INDEX: 43.26 KG/M2 | HEART RATE: 78 BPM | DIASTOLIC BLOOD PRESSURE: 95 MMHG | WEIGHT: 309 LBS | SYSTOLIC BLOOD PRESSURE: 152 MMHG

## 2021-11-05 DIAGNOSIS — Z79.4 INSULIN LONG-TERM USE (HCC): ICD-10-CM

## 2021-11-05 DIAGNOSIS — R79.1 SUPRATHERAPEUTIC INR: ICD-10-CM

## 2021-11-05 DIAGNOSIS — E11.65 UNCONTROLLED TYPE 2 DIABETES MELLITUS WITH HYPERGLYCEMIA (HCC): ICD-10-CM

## 2021-11-05 DIAGNOSIS — E11.65 UNCONTROLLED TYPE 2 DIABETES MELLITUS WITH HYPERGLYCEMIA (HCC): Primary | ICD-10-CM

## 2021-11-05 DIAGNOSIS — E11.649 TYPE 2 DIABETES MELLITUS WITH HYPOGLYCEMIA WITHOUT COMA, WITH LONG-TERM CURRENT USE OF INSULIN (HCC): ICD-10-CM

## 2021-11-05 DIAGNOSIS — I10 ESSENTIAL HYPERTENSION: ICD-10-CM

## 2021-11-05 DIAGNOSIS — E78.2 MIXED HYPERLIPIDEMIA: ICD-10-CM

## 2021-11-05 DIAGNOSIS — Z79.4 TYPE 2 DIABETES MELLITUS WITH HYPOGLYCEMIA WITHOUT COMA, WITH LONG-TERM CURRENT USE OF INSULIN (HCC): ICD-10-CM

## 2021-11-05 DIAGNOSIS — I48.0 PAROXYSMAL ATRIAL FIBRILLATION (HCC): Primary | ICD-10-CM

## 2021-11-05 LAB
ALBUMIN SERPL-MCNC: 3.9 G/DL (ref 3.5–5.2)
ALBUMIN/GLOB SERPL: 1.4 G/DL
ALP SERPL-CCNC: 59 U/L (ref 39–117)
ALT SERPL W P-5'-P-CCNC: 21 U/L (ref 1–41)
ANION GAP SERPL CALCULATED.3IONS-SCNC: 9.1 MMOL/L (ref 5–15)
AST SERPL-CCNC: 22 U/L (ref 1–40)
BILIRUB SERPL-MCNC: 0.6 MG/DL (ref 0–1.2)
BUN SERPL-MCNC: 9 MG/DL (ref 8–23)
BUN/CREAT SERPL: 8.3 (ref 7–25)
CALCIUM SPEC-SCNC: 9.2 MG/DL (ref 8.6–10.5)
CHLORIDE SERPL-SCNC: 106 MMOL/L (ref 98–107)
CHOLEST SERPL-MCNC: 137 MG/DL (ref 0–200)
CO2 SERPL-SCNC: 20.9 MMOL/L (ref 22–29)
CREAT SERPL-MCNC: 1.08 MG/DL (ref 0.76–1.27)
GFR SERPL CREATININE-BSD FRML MDRD: 68 ML/MIN/1.73
GLOBULIN UR ELPH-MCNC: 2.7 GM/DL
GLUCOSE SERPL-MCNC: 173 MG/DL (ref 65–99)
HDLC SERPL-MCNC: 31 MG/DL (ref 40–60)
INR PPP: 2.35 (ref 0.85–1.16)
LDLC SERPL CALC-MCNC: 85 MG/DL (ref 0–100)
LDLC/HDLC SERPL: 2.69 {RATIO}
POTASSIUM SERPL-SCNC: 4.7 MMOL/L (ref 3.5–5.2)
PROT SERPL-MCNC: 6.6 G/DL (ref 6–8.5)
PROTHROMBIN TIME: 24.8 SECONDS (ref 11.4–14.4)
SODIUM SERPL-SCNC: 136 MMOL/L (ref 136–145)
TRIGL SERPL-MCNC: 113 MG/DL (ref 0–150)
TSH SERPL DL<=0.05 MIU/L-ACNC: 2.53 UIU/ML (ref 0.27–4.2)
VLDLC SERPL-MCNC: 21 MG/DL (ref 5–40)

## 2021-11-05 PROCEDURE — 95251 CONT GLUC MNTR ANALYSIS I&R: CPT | Performed by: INTERNAL MEDICINE

## 2021-11-05 PROCEDURE — 80061 LIPID PANEL: CPT

## 2021-11-05 PROCEDURE — 85610 PROTHROMBIN TIME: CPT

## 2021-11-05 PROCEDURE — 36415 COLL VENOUS BLD VENIPUNCTURE: CPT

## 2021-11-05 PROCEDURE — 82570 ASSAY OF URINE CREATININE: CPT

## 2021-11-05 PROCEDURE — 82043 UR ALBUMIN QUANTITATIVE: CPT

## 2021-11-05 PROCEDURE — 99214 OFFICE O/P EST MOD 30 MIN: CPT | Performed by: INTERNAL MEDICINE

## 2021-11-05 PROCEDURE — 80053 COMPREHEN METABOLIC PANEL: CPT

## 2021-11-05 PROCEDURE — 84443 ASSAY THYROID STIM HORMONE: CPT

## 2021-11-05 NOTE — PROGRESS NOTES
Anticoagulation Clinic - Remote Progress Note  REMOTE LAB    Indication: paroxysmal A. Fib (s/p Watchman 9/27/2021) and hx CVA (6/2020)  Referring Provider: HAMILTON Joiner  Initial Warfarin Start Date:   Goal INR: 2-3   Current Drug Interactions: Asa,  [id number]: 6 (stroke, Age, HTN, DM; MI)  Bleed Risk: HASBLED - 3  Other: Was on Eliquis, but switched to warfarin due to costs   At discharge: Warfarin 10 mg MWFSun, 12.5 mg Tues,Sat,Thurs    Diet: iceburg lettuce, green beans, peas (advised to keep consistent)   Alcohol: None  Tobacco: Former smoker  OTC Pain Medication:    INR History:  Date 10/4 10/14 10/18 10/21 10/25 10/28 11/1   Total Weekly Dose 77.5mg 77.5mg 80mg 90mg???? 100mg 97.5mg 90mg   INR 2.3 1.9 1.4 1.0 1.8 2.5 3.2   Notes     rec 10/26       Phone Interview:  Tablet Strength: 5mg  Patient Contact Info: [phone];   Estimated OOP cost:  likely ~$0  Verbal Release Auth: OK to [facility] - he verbalized ok to speak with Torito Herrera his emergency contact ([phone]) if needed.  Lab Contact Info: [facility] [phone]    Patient Findings    Negatives:  Signs/symptoms of thrombosis, Signs/symptoms of bleeding, Laboratory test error suspected, Change in health, Change in alcohol use, Change in activity, Upcoming invasive procedure, Emergency department visit, Upcoming dental procedure, Missed doses, Extra doses, Change in medications, Change in diet/appetite, Hospital admission, Bruising, Other complaints       Plan:  1. INR is therapeutic at 2.43.  Instructed pt to continue warfarin 12.5mg daily except 10mg MonWed until recheck   2. Repeat INR 11/11 if warfarin not d/c at RAMÓN 11/10  3. Pt declines warfarin refills.  4. Verbal information provided over the phone to Walter Herrera. Walter Herrera  expresses understanding by teach back, RBV dosing instructions, and has no further questions at this time.      Maryam Huffman, PharmD.  11/05/21   16:27 EDT

## 2021-11-05 NOTE — PROGRESS NOTES
"     Office Note      Date: 2021  Patient Name: Walter Herrera  MRN: [id number]  : 1950    Chief Complaint   Patient presents with   • Diabetes     TYPE II       History of Present Illness:   Walter Herrera is a 70 y.o. male who presents for Diabetes type 2. Diagnosed in: . Treated in past with oral agents. Current treatments: metformin and premix insulin. Number of insulin shots per day: 2. Checks blood sugar 288 times a day - on [employer]ine[employer] CGM. Has low blood sugar: occasional. Aspirin use: No - on warfarin. Statin use: Yes. ACE-I/ARB use: No -  . Changes in health since last visit: none. Last eye exam 2021.    Subjective      Diabetic Complications:  Eyes: No  Kidneys: No  Feet: Yes - neuropathy  Heart: Yes -      Diet and Exercise:  Meals per day: 3  Minutes of exercise per week: 0 mins.    Review of Systems:   Review of Systems   Constitutional: Negative.    Cardiovascular: Negative.    Gastrointestinal: Negative.    Endocrine: Negative.        The following portions of the patient's history were reviewed and updated as appropriate: allergies, current medications, past family history, past medical history, past social history, past surgical history and problem list.    Objective       Visit Vitals  /95 (BP Location: Right arm, Patient Position: Sitting, Cuff Size: Adult)   Pulse 78   Ht 180.3 cm (71\")   Wt (!) 140 kg (309 lb)   SpO2 97%   BMI 43.10 kg/m²       Physical Exam:  Physical Exam  Constitutional:       Appearance: Normal appearance.   Cardiovascular:      Pulses:           Dorsalis pedis pulses are 2+ on the right side and 2+ on the left side.        Posterior tibial pulses are 2+ on the right side and 2+ on the left side.   Feet:      Right foot:      Protective Sensation: 5 sites tested. 2 sites sensed.      Skin integrity: Skin integrity normal.      Toenail Condition: Right toenails are abnormally thick.      Left foot:      Protective Sensation: 5 sites tested. 5 " sites sensed.      Skin integrity: Skin integrity normal.      Toenail Condition: Left toenails are abnormally thick.      Comments: Decreased sensation on right toes  Neurological:      Mental Status: He is alert.         Labs:    HbA1c  Lab Results   Component Value Date    HGBA1C 7.70 (H) 09/24/2021       CMP  Lab Results   Component Value Date    GLUCOSE 159 (H) 09/24/2021    BUN 13 09/24/2021    CREATININE 1.11 09/24/2021    EGFRIFNONA 65 09/24/2021    BCR 11.7 09/24/2021    K 5.1 09/24/2021    CO2 28.0 09/24/2021    CALCIUM 9.7 09/24/2021        Lipid Panel        TSH  No results found for: TSH, FREET4     Hemoglobin A1C  Lab Results   Component Value Date    HGBA1C 7.70 (H) 09/24/2021        Microalbumin/Creatinine  Lab Results   Component Value Date    MALBCRERATIO  11/12/2020      Comment:      Unable to calculate    MICROALBUR <1.2 11/12/2020           Assessment / Plan      Assessment & Plan:  Diagnoses and all orders for this visit:    1. Uncontrolled type 2 diabetes mellitus with hyperglycemia (HCC) (Primary)  Assessment & Plan:  Diabetes is improving with treatment.  Recent A1c okay at 7.7%.  CGM shows some postprandial spikes but not too severe.  Continue current treatment regimen.  Diabetes will be reassessed in 3 months.    Orders:  -     Comprehensive Metabolic Panel; Future  -     Lipid Panel; Future  -     Microalbumin / Creatinine Urine Ratio - Urine, Clean Catch; Future  -     TSH; Future    2. Type 2 diabetes mellitus with hypoglycemia without coma, with long-term current use of insulin (HCC)  Assessment & Plan:  Continue CGM.      3. Essential hypertension  Assessment & Plan:  BP a bit elevated today.  Continue current meds.  Check BP at home.      4. Mixed hyperlipidemia  Assessment & Plan:  Continue statin.  Check lipids today.      5. Insulin long-term use (HCC)    6. Supratherapeutic INR  Assessment & Plan:  He asked us to draw INR today for Dr. Vergara since it is due  today.    Orders:  -     Protime-INR Coumadin YES  (Warfarin) Therapy; Future      Return in about 3 months (around 2/5/2022) for Recheck with A1c.    Misael Vidales MD   11/05/2021

## 2021-11-05 NOTE — ASSESSMENT & PLAN NOTE
Diabetes is improving with treatment.  Recent A1c okay at 7.7%.  CGM shows some postprandial spikes but not too severe.  Continue current treatment regimen.  Diabetes will be reassessed in 3 months.

## 2021-11-06 LAB
ALBUMIN UR-MCNC: 1.8 MG/DL
CREAT UR-MCNC: 36.6 MG/DL
MICROALBUMIN/CREAT UR: 49.2 MG/G

## 2021-11-08 ENCOUNTER — APPOINTMENT (OUTPATIENT)
Dept: PREADMISSION TESTING | Facility: HOSPITAL | Age: 71
End: 2021-11-08

## 2021-11-08 DIAGNOSIS — I48.0 PAROXYSMAL ATRIAL FIBRILLATION (HCC): ICD-10-CM

## 2021-11-08 LAB — SARS-COV-2 RNA PNL SPEC NAA+PROBE: NOT DETECTED

## 2021-11-08 PROCEDURE — C9803 HOPD COVID-19 SPEC COLLECT: HCPCS

## 2021-11-08 PROCEDURE — U0004 COV-19 TEST NON-CDC HGH THRU: HCPCS

## 2021-11-08 PROCEDURE — U0005 INFEC AGEN DETEC AMPLI PROBE: HCPCS

## 2021-11-09 ENCOUNTER — ANTICOAGULATION VISIT (OUTPATIENT)
Dept: PHARMACY | Facility: HOSPITAL | Age: 71
End: 2021-11-09

## 2021-11-09 DIAGNOSIS — I48.0 PAROXYSMAL ATRIAL FIBRILLATION (HCC): Primary | ICD-10-CM

## 2021-11-10 ENCOUNTER — HOSPITAL ENCOUNTER (OUTPATIENT)
Dept: CARDIOLOGY | Facility: HOSPITAL | Age: 71
Discharge: HOME | End: 2021-11-10
Admitting: NURSE PRACTITIONER

## 2021-11-10 VITALS
OXYGEN SATURATION: 98 % | WEIGHT: 309 LBS | DIASTOLIC BLOOD PRESSURE: 90 MMHG | HEART RATE: 77 BPM | HEIGHT: 71 IN | BODY MASS INDEX: 43.26 KG/M2 | SYSTOLIC BLOOD PRESSURE: 150 MMHG | RESPIRATION RATE: 14 BRPM

## 2021-11-10 DIAGNOSIS — I48.0 PAROXYSMAL ATRIAL FIBRILLATION (HCC): ICD-10-CM

## 2021-11-10 LAB
BH CV ECHO MEAS - BSA(HAYCOCK): 2.7 M^2
BH CV ECHO MEAS - BSA: 2.5 M^2
BH CV ECHO MEAS - BZI_BMI: 43.3 KILOGRAMS/M^2
BH CV ECHO MEAS - BZI_METRIC_HEIGHT: 180 CM
BH CV ECHO MEAS - BZI_METRIC_WEIGHT: 140.2 KG
BH CV VAS BP LEFT ARM: NORMAL MMHG
MAXIMAL PREDICTED HEART RATE: 150 BPM
STRESS TARGET HR: 128 BPM

## 2021-11-10 PROCEDURE — 25010000002 MIDAZOLAM PER 1 MG: Performed by: INTERNAL MEDICINE

## 2021-11-10 PROCEDURE — 93312 ECHO TRANSESOPHAGEAL: CPT

## 2021-11-10 PROCEDURE — 93321 DOPPLER ECHO F-UP/LMTD STD: CPT

## 2021-11-10 PROCEDURE — 93312 ECHO TRANSESOPHAGEAL: CPT | Performed by: INTERNAL MEDICINE

## 2021-11-10 PROCEDURE — 25010000002 FENTANYL CITRATE (PF) 50 MCG/ML SOLUTION: Performed by: INTERNAL MEDICINE

## 2021-11-10 PROCEDURE — 93321 DOPPLER ECHO F-UP/LMTD STD: CPT | Performed by: INTERNAL MEDICINE

## 2021-11-10 PROCEDURE — 93325 DOPPLER ECHO COLOR FLOW MAPG: CPT

## 2021-11-10 PROCEDURE — 93325 DOPPLER ECHO COLOR FLOW MAPG: CPT | Performed by: INTERNAL MEDICINE

## 2021-11-10 RX ORDER — FENTANYL CITRATE 50 UG/ML
INJECTION, SOLUTION INTRAMUSCULAR; INTRAVENOUS
Status: COMPLETED | OUTPATIENT
Start: 2021-11-10 | End: 2021-11-10

## 2021-11-10 RX ORDER — CLOPIDOGREL BISULFATE 75 MG/1
75 TABLET ORAL DAILY
Qty: 30 TABLET | Refills: 6 | Status: SHIPPED | OUTPATIENT
Start: 2021-11-10 | End: 2022-04-11

## 2021-11-10 RX ORDER — MIDAZOLAM HYDROCHLORIDE 1 MG/ML
INJECTION INTRAMUSCULAR; INTRAVENOUS
Status: COMPLETED | OUTPATIENT
Start: 2021-11-10 | End: 2021-11-10

## 2021-11-10 RX ADMIN — FENTANYL CITRATE 50 MCG: 50 INJECTION, SOLUTION INTRAMUSCULAR; INTRAVENOUS at 10:33

## 2021-11-10 RX ADMIN — MIDAZOLAM HYDROCHLORIDE 2 MG: 1 INJECTION, SOLUTION INTRAMUSCULAR; INTRAVENOUS at 10:26

## 2021-11-10 NOTE — PROGRESS NOTES
Patient underwent post watchman RAMÓN today.  This shows good device placement.  No significant peridevice flow.  We will discontinue his warfarin today.  We will add Plavix 75 mg daily to aspirin 81 mg daily.

## 2021-11-10 NOTE — H&P
[facility] Cardiology    Date of Hospital Visit: 11/10/21    Place of Service: [facility]    Patient Name: Walter Herrera  :1950      Primary Care Provider: Michael Mendiola MD    Chief complaint/Reason for Consultation:  Atrial Fibrillation    Problem List:  1.  Paroxysmal Atrial Fibrillation   1. CHADSvasc = 6  on warfarin (used to be Eliquis- unable to afford cost)   2. Echocardiogram 10/11/19: EF 60%, moderate mitral annular calcification, LA normal in size  3. 2021 watchman device placement per Dr. Vergara  4. 11/10/2021 RAMÓN for assessment of watchman device:  2. Coronary Artery Disease  1. CABG x 2 2016  2. Stress Test 10/11/19: normal myocardial perfusion imaging, EF normal without wall motion abnormality  3. Sick Sinus Syndrome  1. BSC PPM implant 2016  4. DMII - Metformin and insulin  5. HTN  6. HLP  7. MONIQUE - compliant with Bipap  8. History of CVA 2020 - on warfarin with subtherapeutic INR   9. Surgical History:  1. Cholecystectomy  2. CABG  3. Right shoulder surgery x 2   4. Right and left inguinal hernia repair       History of Present Illness:  Walter Herrera is a 70-year-old  male with above past medical history presenting to [facility] today for RAMÓN evaluation of his previously placed watchman device on 2021.    Regarding the patient's atrial fibrillation, the patient states that he has mild symptoms of palpitations and shortness of breath that do affect his quality of life. Also notably, chart review reveals that the patient has had a history of low A. fib burden.  Despite the patient's historical low A. fib burden, the patient still required anticoagulation for his breakthrough episodes of atrial fibrillation.     History today and chart review reveal that the patient elected to have watchman device placement on 2021 due to the fact that he has had a previous stroke on subtherapeutic warfarin therapy  around 2 years ago.  Prior to the watchman device placement, multidisciplinary decision-making team all suggested that watchman device implantation was the best option considering the patient's history of CVA despite warfarin therapy and due to his desire to not limit his diet/ regulary check his INR.      Allergies   Allergen Reactions   • Erythromycin Diarrhea   • Morphine Unknown - Low Severity     Blisters   • Penicillins Rash       Current Outpatient Medications   Medication Instructions   • acetaminophen (TYLENOL) 650 mg, Oral, Every 6 Hours PRN   • Apoaequorin (PREVAGEN PO) 1 tablet, Oral, Daily   • aspirin 81 mg, Oral, Daily   • atorvastatin (LIPITOR) 40 mg, Oral, Daily   • bisoprolol (ZEBETA) 5 mg, Oral, 2 times daily   • bumetanide (BUMEX) 2 MG tablet 1 tablet, Daily PRN   • fluticasone (FLONASE) 50 MCG/ACT nasal spray 2 sprays, Nasal, Daily   • gabapentin (NEURONTIN) 300 MG capsule TAKE 1 CAPSULE BY MOUTH THREE TIMES DAILY   • glucose blood (OneTouch Ultra) test strip Test blood sugar twice daily.  Dx E11.65   • insulin NPH-insulin regular (humuLIN 70/30,novoLIN 70/30) (70-30) 100 UNIT/ML injection Subcutaneous, 2 Times Daily With Meals, 84 units am, 20 units pm   • metFORMIN (GLUCOPHAGE) 500 mg, Oral, 2 Times Daily With Meals, First dose in AM tommorrow   • minocycline (MINOCIN,DYNACIN) 100 mg, Oral, Daily   • Mirabegron ER (MYRBETRIQ) 25 mg, Oral, Daily   • nitroglycerin (NITROSTAT) 0.4 MG SL tablet 1 under the tongue as needed for angina, may repeat q5mins for up three doses   • potassium chloride (K-DUR,KLOR-CON) 20 MEQ CR tablet 20 mEq, Oral, Daily PRN   • probenecid (BENEMID) 500 mg, Oral, 2 Times Daily   • psyllium (METAMUCIL) 58.6 % packet 1 packet, Oral, Daily   • warfarin (Coumadin) 5 MG tablet Take 2-2.5 tablets by mouth once daily or as directed by the anticoagulation clinic   • warfarin (COUMADIN) 10 mg, Oral, 10mg on M,W,F,Sun, 12.5mg Tues & Sat and Thurs.           Social History      Socioeconomic History   • Marital status:    Tobacco Use   • Smoking status: Former Smoker     Types: Pipe     Quit date: 1972     Years since quittin.1   • Smokeless tobacco: Never Used   Vaping Use   • Vaping Use: Never used   Substance and Sexual Activity   • Alcohol use: Not Currently     Alcohol/week: 1.0 standard drink     Types: 1 Cans of beer per week   • Drug use: Never   • Sexual activity: Defer     Comment:         Family History   Problem Relation Age of Onset   • Heart attack Mother    • Heart disease Mother    • Dementia Mother    • Hepatitis Father    • Heart disease Sister    • Heart disease Brother    • Heart disease Brother        REVIEW OF SYSTEMS:   Review of Systems   Cardiovascular: Negative for chest pain, irregular heartbeat and palpitations.   Respiratory: Negative for shortness of breath.             Objective:  There were no vitals filed for this visit.  There is no height or weight on file to calculate BMI.      Vitals and nursing note reviewed.   Constitutional:       General: Awake. Not in acute distress.     Appearance: Healthy appearance. Well-developed and not in distress. Morbidly obese.   Eyes:      General: No scleral icterus.     Conjunctiva/sclera: Conjunctivae normal.      Pupils: Pupils are equal, round, and reactive to light.   HENT:      Head: Normocephalic and atraumatic.      Nose: Nose normal.    Mouth/Throat:      Pharynx: Uvula midline.   Neck:      Thyroid: No thyromegaly.      Trachea: No tracheal deviation.      Lymphadenopathy: No cervical adenopathy.   Pulmonary:      Effort: Pulmonary effort is normal.      Breath sounds: Normal breath sounds. No wheezing. No rhonchi. No rales.   Cardiovascular:      Normal rate. Regular rhythm. Normal S1. Normal S2.      Murmurs: There is no murmur.      No gallop. No click. No rub.   Pulses:     Intact distal pulses.   Edema:     Peripheral edema absent.   Abdominal:      General: Bowel sounds are  normal.      Palpations: Abdomen is soft. There is no abdominal mass.      Tenderness: There is no abdominal tenderness. There is no guarding.   Musculoskeletal:         General: No tenderness.      Extremities: No clubbing present.     Cervical back: Normal range of motion and neck supple. Skin:     General: Skin is warm and dry. There is no cyanosis.      Findings: No erythema or rash.   Neurological:      Mental Status: Alert, oriented to person, place, and time and oriented to person, place and time.   Psychiatric:         Attention and Perception: Attention normal.         Mood and Affect: Mood normal.         Speech: Speech normal.         Behavior: Behavior normal. Behavior is cooperative.             Lab Review:       Results from last 7 days   Lab Units 11/05/21  1026   SODIUM mmol/L 136   POTASSIUM mmol/L 4.7   CHLORIDE mmol/L 106   CO2 mmol/L 20.9*   BUN mg/dL 9   CREATININE mg/dL 1.08   GLUCOSE mg/dL 173*   CALCIUM mg/dL 9.2         Estimated Creatinine Clearance: 90.9 mL/min (by C-G formula based on SCr of 1.08 mg/dL).    Lab Results   Component Value Date    CHOL 137 11/05/2021    TRIG 113 11/05/2021    HDL 31 (L) 11/05/2021    LDL 85 11/05/2021             Assessment:   · History of paroxysmal atrial fibrillation status post watchman device implantation.        Plan:   · RAMÓN today to assess for watchman device placement        Electronically signed by Tio Graham PA-C, 11/10/21, 9:42 AM EST.

## 2021-11-10 NOTE — PROGRESS NOTES
Anticoagulation Clinic - Remote Progress Note  REMOTE LAB    Indication: paroxysmal A. Fib (s/p Watchman 9/27/2021) and hx CVA (6/2020)  Referring Provider: HAMILTON Joiner  Initial Warfarin Start Date:   Goal INR: 2-3   Current Drug Interactions: Asa,  [id number]: 6 (stroke, Age, HTN, DM; MI)  Bleed Risk: HASBLED - 3  Other: Was on Eliquis, but switched to warfarin due to costs   At discharge: Warfarin 10 mg MWFSun, 12.5 mg Tues,Sat,Thurs    Diet: iceburg lettuce, green beans, peas (advised to keep consistent)   Alcohol: None  Tobacco: Former smoker  OTC Pain Medication:    INR History:  Date 10/4 10/14 10/18 10/21 10/25 10/28 11/1 11/9   Total Weekly Dose 77.5mg 77.5mg 80mg 90mg???? 100mg 97.5mg 90mg 82.5mg   INR 2.3 1.9 1.4 1.0 1.8 2.5 3.2 1.9   Notes     rec 10/26        Phone Interview:  Tablet Strength: 5mg  Patient Contact Info: [phone];   Estimated OOP cost:  likely ~$0  Verbal Release Auth: OK to [facility] - he verbalized ok to speak with Torito Herrera his emergency contact ([phone]) if needed.  Lab Contact Info: [facility] [phone]    Patient Findings    Negatives:  Signs/symptoms of thrombosis, Signs/symptoms of bleeding, Laboratory test error suspected, Change in health, Change in alcohol use, Change in activity, Upcoming invasive procedure, Emergency department visit, Upcoming dental procedure, Missed doses, Extra doses, Change in medications, Change in diet/appetite, Hospital admission, Bruising, Other complaints   Comments:  Patient reports RAMÓN went well on 11/10. Warfarin has been d/c. No changes from previous encounter.          Plan:  1. INR is SUBtherapeutic at 1.9. Patient underwent RAMÓN on 11/10, where Dr. Medina has d/c warfarin therapy. Patient has been instructed not to take warfarin if he has tablets remaining and to follow current plan of Plavix + ASA 81 mg per Dr. Medina.   2. No repeat INR needed unless warfarin restarted.   3. Can close patient encounter at this time.  Patient is free to call clinic at any time with questions/concerns.    4. Verbal information provided over the phone to Walter Mattiheu Herrera. Walter Herrera  expresses understanding by teach back, RBV dosing instructions, and has no further questions at this time.        Aly Swanson, Pharmacy Intern      I, Maryam Huffman, PharmD, have reviewed the note in full and agree with the assessment and plan.  11/11/21  09:19 EST

## 2021-11-11 DIAGNOSIS — Z95.818 PRESENCE OF WATCHMAN LEFT ATRIAL APPENDAGE CLOSURE DEVICE: ICD-10-CM

## 2021-11-11 DIAGNOSIS — I48.0 PAROXYSMAL ATRIAL FIBRILLATION (HCC): Primary | ICD-10-CM

## 2021-11-11 LAB — INR PPP: 1.9

## 2021-11-29 ENCOUNTER — TELEPHONE (OUTPATIENT)
Dept: ENDOCRINOLOGY | Facility: CLINIC | Age: 71
End: 2021-11-29

## 2021-11-29 NOTE — TELEPHONE ENCOUNTER
CALLED PT TO LET HIM KNOW THAT DR. WALLACE REVIEWED HIS GLUCOSE LOG AND NO CHANGES NEEDS TO BE MADE.     PT DID NOT ANSWER BUT VOICEMAIL WAS LEFT.

## 2021-12-02 ENCOUNTER — TELEPHONE (OUTPATIENT)
Dept: ENDOCRINOLOGY | Facility: CLINIC | Age: 71
End: 2021-12-02

## 2021-12-02 RX ORDER — PEN NEEDLE, DIABETIC 29 G X1/2"
NEEDLE, DISPOSABLE MISCELLANEOUS
Qty: 200 EACH | Refills: 1 | Status: SHIPPED | OUTPATIENT
Start: 2021-12-02

## 2021-12-02 NOTE — TELEPHONE ENCOUNTER
"Pt called requesting a prescription on Relion 31g 8mm 5/16\" needle quantity 200. Pt last seen 11/05/21 pt next appt 04/06/22  "

## 2021-12-02 NOTE — TELEPHONE ENCOUNTER
Called and s/w pt in re: to glucose readings; per Dr. Vidales, no insulin changes. Reminded pt to keep f/u appt in April; request to mail reminder. Pt had no further questions at this time.

## 2021-12-24 DIAGNOSIS — E11.49 TYPE 2 DIABETES MELLITUS WITH NEUROLOGICAL MANIFESTATIONS (HCC): ICD-10-CM

## 2021-12-27 RX ORDER — GABAPENTIN 300 MG/1
CAPSULE ORAL
Qty: 270 CAPSULE | Refills: 0 | Status: SHIPPED | OUTPATIENT
Start: 2021-12-27 | End: 2022-06-13

## 2022-01-04 ENCOUNTER — TELEPHONE (OUTPATIENT)
Dept: ENDOCRINOLOGY | Facility: CLINIC | Age: 72
End: 2022-01-04

## 2022-01-04 NOTE — TELEPHONE ENCOUNTER
Called pt and spoke with him about his glucose log. I let him know that Dr. Vidales wants to increase his breakfast insulin dose to 86 units instead of 84. Pt verbalized understanding.

## 2022-01-18 ENCOUNTER — OFFICE VISIT (OUTPATIENT)
Dept: CARDIOLOGY | Facility: CLINIC | Age: 72
End: 2022-01-18

## 2022-01-18 VITALS
OXYGEN SATURATION: 95 % | SYSTOLIC BLOOD PRESSURE: 126 MMHG | BODY MASS INDEX: 42 KG/M2 | DIASTOLIC BLOOD PRESSURE: 62 MMHG | HEIGHT: 71 IN | HEART RATE: 74 BPM | WEIGHT: 300 LBS

## 2022-01-18 DIAGNOSIS — I25.10 ATHEROSCLEROSIS OF NATIVE CORONARY ARTERY OF NATIVE HEART WITHOUT ANGINA PECTORIS: ICD-10-CM

## 2022-01-18 DIAGNOSIS — I48.0 PAROXYSMAL ATRIAL FIBRILLATION: ICD-10-CM

## 2022-01-18 DIAGNOSIS — G47.33 OSA TREATED WITH BIPAP: ICD-10-CM

## 2022-01-18 DIAGNOSIS — I48.0 AF (PAROXYSMAL ATRIAL FIBRILLATION): Primary | ICD-10-CM

## 2022-01-18 PROCEDURE — 93280 PM DEVICE PROGR EVAL DUAL: CPT | Performed by: PHYSICIAN ASSISTANT

## 2022-01-18 PROCEDURE — 93000 ELECTROCARDIOGRAM COMPLETE: CPT | Performed by: PHYSICIAN ASSISTANT

## 2022-01-18 PROCEDURE — 99214 OFFICE O/P EST MOD 30 MIN: CPT | Performed by: PHYSICIAN ASSISTANT

## 2022-01-18 NOTE — PROGRESS NOTES
[facility]   OFFICE NOTE      Walter Herrera  1950  PCP: Michael Mendiola MD    SUBJECTIVE:   Walter Herrera is a 71 y.o. male seen for a follow up visit regarding the following:     CC:PAF    HPI:   71 year old male seen in follow up regarding PAF, CAD, SSS, and recent Watchman device. Since the Watchman he is doing well. He has rare symptomatic AF episodes. He denies any SOB, CP, LH, and dizziness. Denies any hospitalizations, ER visits, bleeding, or TIA/CVA symptoms. Overall feels well.    Cardiac PMH: (Old records have been reviewed and summarized below)  1. Paroxysmal Atrial Fibrillation   a. CHADSvasc = 6  on warfarin (used to be Eliquis- unable to afford cost)   b. Echocardiogram 10/11/19: EF 60%, moderate mitral annular calcification, LA normal in size  c. Watchman flex 9/27/2021.   d. Follow up RAMÓN 11/10/2021 Normal EF, small flow adjacent to device measuring 2.3mm that does not communicate with base of appendage.   2. Coronary Artery Disease  a. CABG x 2 1/2016  b. Stress Test 10/11/19: normal myocardial perfusion imaging, EF normal without wall motion abnormality  3. Sick Sinus Syndrome  a. BSC PPM implant 4/2016  4. DMII - Metformin and insulin  5. HTN  6. HLP  7. MONIQUE - compliant with Bipap  8. History of CVA 6/2020 - on warfarin with subtherapeutic INR   9. Surgical History:  a. Cholecystectomy  b. CABG  c. Right shoulder surgery x 2   d. Right and left inguinal hernia repair       Past Medical History, Past Surgical History, Family history, Social History, and Medications were all reviewed with the patient today and updated as necessary.       Current Outpatient Medications:   •  acetaminophen (TYLENOL) 650 MG/20.3ML solution solution, Take 650 mg by mouth Every 6 (Six) Hours As Needed for Mild Pain ., Disp: , Rfl:   •  Apoaequorin (PREVAGEN PO), Take 1 tablet by mouth Daily., Disp: , Rfl:   •  aspirin (aspirin) 81 MG EC tablet, Take 1 tablet  by mouth Daily., Disp: 90 tablet, Rfl: 1  •  atorvastatin (LIPITOR) 40 MG tablet, Take 40 mg by mouth Daily., Disp: , Rfl:   •  bisoprolol (ZEBeta) 10 MG tablet, Take 5 mg by mouth 2 (two) times a day., Disp: , Rfl:   •  bumetanide (BUMEX) 2 MG tablet, 1 tablet Daily As Needed., Disp: , Rfl:   •  clopidogrel (PLAVIX) 75 MG tablet, Take 1 tablet by mouth Daily., Disp: 30 tablet, Rfl: 6  •  fluticasone (FLONASE) 50 MCG/ACT nasal spray, 2 sprays into the nostril(s) as directed by provider Daily., Disp: 16 g, Rfl: 3  •  gabapentin (NEURONTIN) 300 MG capsule, TAKE 1 CAPSULE BY MOUTH THREE TIMES DAILY, Disp: 270 capsule, Rfl: 0  •  glucose blood (OneTouch Ultra) test strip, Test blood sugar twice daily.  Dx E11.65, Disp: 200 each, Rfl: 1  •  insulin NPH-insulin regular (humuLIN 70/30,novoLIN 70/30) (70-30) 100 UNIT/ML injection, Inject  under the skin into the appropriate area as directed 2 (Two) Times a Day With Meals. 84 units am,16 units pm, Disp: , Rfl:   •  Insulin Pen Needle (RELION PEN NEEDLE 31G/8MM) 31G X 8 MM misc, Use twice daily., Disp: 200 each, Rfl: 1  •  metFORMIN (GLUCOPHAGE) 500 MG tablet, Take 1 tablet by mouth 2 (Two) Times a Day With Meals. First dose in AM tommorrow, Disp: 60 tablet, Rfl: 6  •  minocycline (MINOCIN,DYNACIN) 100 MG capsule, Take 100 mg by mouth Daily., Disp: , Rfl:   •  Mirabegron ER (Myrbetriq) 25 MG tablet sustained-release 24 hour 24 hr tablet, Take 25 mg by mouth Daily., Disp: , Rfl:   •  nitroglycerin (NITROSTAT) 0.4 MG SL tablet, 1 under the tongue as needed for angina, may repeat q5mins for up three doses, Disp: 100 tablet, Rfl: 11  •  potassium chloride (K-DUR,KLOR-CON) 20 MEQ CR tablet, Take 1 tablet by mouth Daily As Needed (with bumex)., Disp: 30 tablet, Rfl: 1  •  probenecid (BENEMID) 500 MG tablet, Take 500 mg by mouth 2 (Two) Times a Day., Disp: , Rfl:   •  psyllium (METAMUCIL) 58.6 % packet, Take 1 packet by mouth Daily., Disp: , Rfl:       Allergies   Allergen Reactions  "  • Erythromycin Diarrhea   • Morphine Unknown - Low Severity     Blisters   • Penicillins Rash         PHYSICAL EXAM:    /62 (BP Location: Left arm, Patient Position: Sitting)   Pulse 74   Ht 180.3 cm (71\")   Wt 136 kg (300 lb)   SpO2 95%   BMI 41.84 kg/m²        Wt Readings from Last 5 Encounters:   01/18/22 136 kg (300 lb)   11/10/21 (!) 140 kg (309 lb)   11/05/21 (!) 140 kg (309 lb)   10/04/21 (!) 140 kg (309 lb 2 oz)   09/27/21 (!) 140 kg (309 lb)       BP Readings from Last 5 Encounters:   01/18/22 126/62   11/10/21 150/90   11/05/21 152/95   10/04/21 151/62   09/27/21 127/66       General appearance - Alert, well appearing, and in no distress   Mental status - Affect appropriate to mood.  Eyes - Sclerae anicteric,  ENMT - Hearing grossly normal bilaterally, Dental hygiene good.  Neck - Carotids upstroke normal bilaterally, no bruits, no JVD.  Resp - Clear to auscultation, no wheezes, rales or rhonchi, symmetric air entry.  Heart - Normal rate, regular rhythm, normal S1, S2, no murmurs, rubs, clicks or gallops.  GI - Soft, nontender, nondistended, no masses or organomegaly.  Neurological - Grossly intact - normal speech, no focal findings  Extremities - Peripheral pulses normal, + pedal edema, no clubbing or cyanosis.  Skin - Normal coloration and turgor.  Psych -  oriented to person, place, and time.    Medical problems and test results were reviewed with the patient today.     No results found for this or any previous visit (from the past 672 hour(s)).      EKG: (EKG has been independently visualized by me and summarized below)    ECG 12 Lead    Date/Time: 1/18/2022 9:48 AM  Performed by: Michael Neri PA  Authorized by: Michael Neri PA   Rhythm: sinus rhythm and paced  Rate: normal  Conduction: right bundle branch block  ST Segments: ST segments normal  T Waves: T waves normal  QRS axis: normal    Clinical impression: abnormal EKG            Device Interrogation:  Please see device " interrogation form which has been signed and updated for full details.  [employer] dual-chamber pacemaker a paced 86%.  RV paced 2%.  Normal P wave R wave.  Normal thresholds and impedances.  Battery voltage is 8 years remaining.  4% mode switch since October ASSESSMENT   1.PAF: [place] 4%. Minimally symptomatic.   2.  Tachybrady syndrome: C DDD Pacemaker, Normal function.   3. Chadsvasc= 6, Watchman device 9/27/2021  4. HTN: Followed by Dr. Claudio/Dr Siegel  5. MONIQUE: Compliant Bipap  6. Morbid Obesity, BMI 41.84.     PLAN  · Diet, exercise, needs to loose weight  · Symptomatic A. fib.  Continue to monitor.  Successful watchman device with follow-up RAMÓN November 10, 2021 Minimal peridevice flow (2.3cm.)  · Follow-up in September 2022 Follow up with Dr. Claudio as scheduled.     1/18/2022  09:46 EST  Will Daron ROMEO

## 2022-01-28 RX ORDER — LANCETS
EACH MISCELLANEOUS
Qty: 200 EACH | Refills: 3 | Status: SHIPPED | OUTPATIENT
Start: 2022-01-28

## 2022-01-31 PROCEDURE — 93294 REM INTERROG EVL PM/LDLS PM: CPT | Performed by: INTERNAL MEDICINE

## 2022-01-31 PROCEDURE — 93296 REM INTERROG EVL PM/IDS: CPT | Performed by: INTERNAL MEDICINE

## 2022-02-15 ENCOUNTER — TELEPHONE (OUTPATIENT)
Dept: CARDIOLOGY | Facility: CLINIC | Age: 72
End: 2022-02-15

## 2022-02-15 NOTE — TELEPHONE ENCOUNTER
Patient called requesting a refill on his metFORMIN (GLUCOPHAGE) 500 MG tablet. He needs this sent to [facility] in [place].     Last ov 11/05/21  Follow up scheduled 4/6/22

## 2022-02-15 NOTE — TELEPHONE ENCOUNTER
I spoke with Mr Walter Sommers Herrera to inform him that his bedside monitor is not connecting with his device.  He is sending in a manual transmission now.

## 2022-02-17 ENCOUNTER — TELEPHONE (OUTPATIENT)
Dept: ENDOCRINOLOGY | Facility: CLINIC | Age: 72
End: 2022-02-17

## 2022-02-17 NOTE — TELEPHONE ENCOUNTER
Spoke to pt per WRM:  Increase am insulin to 90 units-pm insulin to 18 units.  He voiced understanding

## 2022-03-14 ENCOUNTER — TELEPHONE (OUTPATIENT)
Dept: ENDOCRINOLOGY | Facility: CLINIC | Age: 72
End: 2022-03-14

## 2022-03-14 NOTE — TELEPHONE ENCOUNTER
----- Message from Misael Vidales MD sent at 3/11/2022  1:20 PM EST -----      ----- Message -----  From: Nadiya Avila  Sent: 3/11/2022   1:16 PM EST  To: Misael Vidales MD

## 2022-03-17 ENCOUNTER — TELEPHONE (OUTPATIENT)
Dept: ENDOCRINOLOGY | Facility: CLINIC | Age: 72
End: 2022-03-17

## 2022-03-18 ENCOUNTER — TELEPHONE (OUTPATIENT)
Dept: CARDIOLOGY | Facility: CLINIC | Age: 72
End: 2022-03-18

## 2022-03-18 NOTE — TELEPHONE ENCOUNTER
Called Mr Herrrea to assist him in getting his latitude monitor connected. After several attempts we got it back on line. I have ordered a cell adaptor and he will call for assistance once he receives it.

## 2022-03-21 RX ORDER — FLUTICASONE PROPIONATE 50 MCG
SPRAY, SUSPENSION (ML) NASAL
Qty: 16 G | Refills: 0 | Status: SHIPPED | OUTPATIENT
Start: 2022-03-21 | End: 2022-09-01

## 2022-03-24 ENCOUNTER — TELEPHONE (OUTPATIENT)
Dept: ENDOCRINOLOGY | Facility: CLINIC | Age: 72
End: 2022-03-24

## 2022-03-24 NOTE — TELEPHONE ENCOUNTER
----- Message from Misael Vidales MD sent at 3/24/2022 11:03 AM EDT -----      ----- Message -----  From: Nadiya Avila  Sent: 3/24/2022   9:37 AM EDT  To: Misael Vidales MD

## 2022-04-11 ENCOUNTER — OFFICE VISIT (OUTPATIENT)
Dept: ENDOCRINOLOGY | Facility: CLINIC | Age: 72
End: 2022-04-11

## 2022-04-11 VITALS
BODY MASS INDEX: 43.54 KG/M2 | HEIGHT: 71 IN | HEART RATE: 75 BPM | OXYGEN SATURATION: 96 % | DIASTOLIC BLOOD PRESSURE: 90 MMHG | WEIGHT: 311 LBS | SYSTOLIC BLOOD PRESSURE: 158 MMHG | RESPIRATION RATE: 16 BRPM

## 2022-04-11 DIAGNOSIS — Z79.4 INSULIN LONG-TERM USE: ICD-10-CM

## 2022-04-11 DIAGNOSIS — I25.10 ATHEROSCLEROSIS OF NATIVE CORONARY ARTERY OF NATIVE HEART WITHOUT ANGINA PECTORIS: ICD-10-CM

## 2022-04-11 DIAGNOSIS — E06.1 SUBACUTE THYROIDITIS: ICD-10-CM

## 2022-04-11 DIAGNOSIS — Z79.4 TYPE 2 DIABETES MELLITUS WITH HYPERGLYCEMIA, WITH LONG-TERM CURRENT USE OF INSULIN: Primary | ICD-10-CM

## 2022-04-11 DIAGNOSIS — I10 ESSENTIAL HYPERTENSION: ICD-10-CM

## 2022-04-11 DIAGNOSIS — E78.2 MIXED HYPERLIPIDEMIA: ICD-10-CM

## 2022-04-11 DIAGNOSIS — E11.65 TYPE 2 DIABETES MELLITUS WITH HYPERGLYCEMIA, WITH LONG-TERM CURRENT USE OF INSULIN: Primary | ICD-10-CM

## 2022-04-11 DIAGNOSIS — E11.49 TYPE 2 DIABETES MELLITUS WITH NEUROLOGICAL MANIFESTATIONS: ICD-10-CM

## 2022-04-11 LAB
EXPIRATION DATE: ABNORMAL
EXPIRATION DATE: NORMAL
GLUCOSE BLDC GLUCOMTR-MCNC: 284 MG/DL (ref 70–130)
HBA1C MFR BLD: 9.3 %
Lab: ABNORMAL
Lab: NORMAL

## 2022-04-11 PROCEDURE — 99214 OFFICE O/P EST MOD 30 MIN: CPT | Performed by: INTERNAL MEDICINE

## 2022-04-11 PROCEDURE — 83036 HEMOGLOBIN GLYCOSYLATED A1C: CPT | Performed by: INTERNAL MEDICINE

## 2022-04-11 PROCEDURE — 3046F HEMOGLOBIN A1C LEVEL >9.0%: CPT | Performed by: INTERNAL MEDICINE

## 2022-04-11 PROCEDURE — 82947 ASSAY GLUCOSE BLOOD QUANT: CPT | Performed by: INTERNAL MEDICINE

## 2022-04-11 RX ORDER — ROSUVASTATIN CALCIUM 10 MG/1
10 TABLET, COATED ORAL DAILY
Qty: 90 TABLET | Refills: 3 | Status: SHIPPED | OUTPATIENT
Start: 2022-04-11 | End: 2022-08-16 | Stop reason: SDUPTHER

## 2022-04-11 RX ORDER — LOSARTAN POTASSIUM 50 MG/1
50 TABLET ORAL DAILY
Qty: 90 TABLET | Refills: 3 | Status: SHIPPED | OUTPATIENT
Start: 2022-04-11 | End: 2022-07-08

## 2022-04-11 NOTE — ASSESSMENT & PLAN NOTE
Hypertension is worsening.  Start ARB today.  Blood pressure will be reassessed at the next regular appointment.

## 2022-04-11 NOTE — PROGRESS NOTES
"     Office Note      Date: 2022  Patient Name: Walter Herrera  MRN: [id number]  : 1950    Chief Complaint   Patient presents with   • Diabetes     Follow up        History of Present Illness:   Walter Herrera is a 71 y.o. male who presents for Diabetes type 2. Diagnosed in: . Treated in past with oral agents. Current treatments: metformin and premix insulin. Number of insulin shots per day: 2. Checks blood sugar 288 times a day - on [employer] [place] CGM. Has low blood sugar: rare. Aspirin use: Yes. Statin use: Yes. ACE-I/ARB use: No -  . Changes in health since last visit: none. Last eye exam 2021.    Subjective      Diabetic Complications:  Eyes: No  Kidneys: No  Feet: Yes - neuropathy  Heart: Yes -      Diet and Exercise:  Meals per day: 3  Minutes of exercise per week: 0 mins.    Review of Systems:   Review of Systems   Constitutional: Negative.    Cardiovascular: Negative.    Gastrointestinal: Negative.    Endocrine: Negative.        The following portions of the patient's history were reviewed and updated as appropriate: allergies, current medications, past family history, past medical history, past social history, past surgical history and problem list.    Objective       Visit Vitals  /90 (BP Location: Left arm, Patient Position: Sitting, Cuff Size: Adult)   Pulse 75   Resp 16   Ht 180.3 cm (71\")   Wt (!) 141 kg (311 lb)   SpO2 96%   BMI 43.38 kg/m²       Physical Exam:  Physical Exam  Constitutional:       Appearance: Normal appearance.   Neurological:      Mental Status: He is alert.         Labs:    HbA1c  Lab Results   Component Value Date    HGBA1C 9.3 2022       CMP  Lab Results   Component Value Date    GLUCOSE 173 (H) 2021    BUN 9 2021    CREATININE 1.08 2021    EGFRIFNONA 68 2021    BCR 8.3 2021    K 4.7 2021    CO2 20.9 (L) 2021    CALCIUM 9.2 2021    AST 22 2021    ALT 21 2021        Lipid Panel  Lab " Results   Component Value Date    HDL 31 (L) 11/05/2021    LDL 85 11/05/2021    TRIG 113 11/05/2021        TSH  Lab Results   Component Value Date    TSH 2.530 11/05/2021        Hemoglobin A1C  Lab Results   Component Value Date    HGBA1C 9.3 04/11/2022        Microalbumin/Creatinine  Lab Results   Component Value Date    FREDDYBCRERATIO 49.2 11/05/2021    MICROALBUR 1.8 11/05/2021           Assessment / Plan      Assessment & Plan:  Diagnoses and all orders for this visit:    1. Type 2 diabetes mellitus with hyperglycemia, with long-term current use of insulin (HCC) (Primary)  Assessment & Plan:  Diabetes is worsening.  Fasting FSBS not too bad.  Evening FSBS are higher.   Titrate up insulin.  Titrate up metformin.  Diabetes will be reassessed in 3 months.    Orders:  -     POC Glucose, Blood  -     POC Glycosylated Hemoglobin (Hb A1C)    2. Type 2 diabetes mellitus with neurological manifestations ([facility])    3. Essential hypertension  Assessment & Plan:  Hypertension is worsening.  Start ARB today.  Blood pressure will be reassessed at the next regular appointment.      4. Mixed hyperlipidemia  Assessment & Plan:  Continue statin but change from atorvastatin to rosuvastatin.  Lipids okay last visit.      5. Atherosclerosis of native coronary artery of native heart without angina pectoris  Assessment & Plan:  Continue ASA and statin.  Haven't started SGLT-2 inhibitor or GLP-1 RA due to cost constraints.       6. Subacute thyroiditis  Assessment & Plan:  TSH was normal last visit.  Plan to recheck next visit.      7. Insulin long-term use (HCC)    Other orders  -     rosuvastatin (CRESTOR) 10 MG tablet; Take 1 tablet by mouth Daily.  Dispense: 90 tablet; Refill: 3  -     losartan (COZAAR) 50 MG tablet; Take 1 tablet by mouth Daily.  Dispense: 90 tablet; Refill: 3  -     metFORMIN (GLUCOPHAGE) 500 MG tablet; Take 1 tablet by mouth 3 (Three) Times a Day.  Dispense: 270 tablet; Refill: 3      Return in about 3 months  (around 7/11/2022) for Recheck with A1c, CMP, TSH, microalbumin.    Misael Vidales MD   04/11/2022

## 2022-04-11 NOTE — ASSESSMENT & PLAN NOTE
Diabetes is worsening.  Fasting FSBS not too bad.  Evening FSBS are higher.   Titrate up insulin.  Titrate up metformin.  Diabetes will be reassessed in 3 months.

## 2022-04-19 ENCOUNTER — TELEPHONE (OUTPATIENT)
Dept: ENDOCRINOLOGY | Facility: CLINIC | Age: 72
End: 2022-04-19

## 2022-04-19 RX ORDER — HYDROGEN PEROXIDE 2.65 ML/100ML
LIQUID ORAL; TOPICAL
Qty: 90 TABLET | Refills: 0 | Status: SHIPPED | OUTPATIENT
Start: 2022-04-19 | End: 2022-08-02

## 2022-04-19 NOTE — TELEPHONE ENCOUNTER
PATIENT HAS QUESTIONS ABOUT MEDICATIONS AND IF HE IS SUPPOSE TO STOP TAKING ATORVASTATIN AS HE JUST PICKED UP A PRESCRIPTION FOR ROSUVASTATIN. PATIENTS NUMBER [phone]

## 2022-04-25 ENCOUNTER — TELEPHONE (OUTPATIENT)
Dept: ENDOCRINOLOGY | Facility: CLINIC | Age: 72
End: 2022-04-25

## 2022-04-25 NOTE — TELEPHONE ENCOUNTER
Called pt to let him know that roosevelt reviewed his glucose log he sent in. Advised the pt she wants him to increase his breakfast dose of insulin from 82 untis to 86 units and increase his supper dose of insulin from 16 units to 18 units.     Pt stated that he already increased him supper dose to 20 units and wanted to ask Roosevelt if he should change his breakfast insulin dose because his blood sugars are running good. Told pt that I would ask Roosevelt and give him a call back.

## 2022-05-05 ENCOUNTER — PATIENT MESSAGE (OUTPATIENT)
Dept: ENDOCRINOLOGY | Facility: CLINIC | Age: 72
End: 2022-05-05

## 2022-05-18 ENCOUNTER — TELEPHONE (OUTPATIENT)
Dept: ENDOCRINOLOGY | Facility: CLINIC | Age: 72
End: 2022-05-18

## 2022-05-18 NOTE — TELEPHONE ENCOUNTER
Patient called stated he was advised to give us a call if he was having more issues with his blood sugar. He stated today he ate a snack and it was 115, then a few hours ago his blood sugar was down to 50. He requested we call him back in 10-15 minutes. Please advise.

## 2022-05-18 NOTE — TELEPHONE ENCOUNTER
Spoke to pt-he is c/o low bs's.  He is taking 70/30 insulin 82 units in am, 16 units in pm.  His blood sugar dropped today to 50 - on 5/14 3am 50,  5/9 537pm 44.  Please advise

## 2022-06-13 DIAGNOSIS — E11.49 TYPE 2 DIABETES MELLITUS WITH NEUROLOGICAL MANIFESTATIONS: ICD-10-CM

## 2022-06-13 RX ORDER — GABAPENTIN 300 MG/1
CAPSULE ORAL
Qty: 270 CAPSULE | Refills: 1 | Status: SHIPPED | OUTPATIENT
Start: 2022-06-13 | End: 2022-08-16 | Stop reason: SDUPTHER

## 2022-06-30 RX ORDER — CLOPIDOGREL BISULFATE 75 MG/1
TABLET ORAL
Qty: 30 TABLET | Refills: 0 | OUTPATIENT
Start: 2022-06-30

## 2022-06-30 NOTE — TELEPHONE ENCOUNTER
Carol, Nikolas Mann MD  You 5 hours ago (11:01 AM)         He can remain off Plavix.  He has taken it for the required amount of time after his watchman device     Patient notified of message above from Dr. Medina. Verbalized understanding.

## 2022-07-06 ENCOUNTER — TELEPHONE (OUTPATIENT)
Dept: ENDOCRINOLOGY | Facility: CLINIC | Age: 72
End: 2022-07-06

## 2022-07-06 NOTE — TELEPHONE ENCOUNTER
Spoke with patient regarding blood sugar readings.  States he has been having issues with his bp.    06/27/2022-142/57    07/04/2022-100/62  07/05/2022-156/76  07/05/2022-136/62  07/06/2022-139/81    States has not been feeling well.  Feels he may have been having some issues with Afib but his heart rate has been running around 75.  Has follow up scheduled with cardiology 08/01/2022.

## 2022-07-06 NOTE — TELEPHONE ENCOUNTER
I would ask him to call the cardiologist about the fluctuation in his BP.  This could be related to the a.fib.

## 2022-07-08 ENCOUNTER — TELEPHONE (OUTPATIENT)
Dept: CARDIOLOGY | Facility: CLINIC | Age: 72
End: 2022-07-08

## 2022-07-08 ENCOUNTER — TELEMEDICINE (OUTPATIENT)
Dept: CARDIOLOGY | Facility: HOSPITAL | Age: 72
End: 2022-07-08

## 2022-07-08 VITALS — HEART RATE: 79 BPM | SYSTOLIC BLOOD PRESSURE: 135 MMHG | DIASTOLIC BLOOD PRESSURE: 54 MMHG

## 2022-07-08 DIAGNOSIS — I25.10 ATHEROSCLEROSIS OF NATIVE CORONARY ARTERY OF NATIVE HEART WITHOUT ANGINA PECTORIS: ICD-10-CM

## 2022-07-08 DIAGNOSIS — I48.0 PAROXYSMAL ATRIAL FIBRILLATION: ICD-10-CM

## 2022-07-08 DIAGNOSIS — I10 ESSENTIAL HYPERTENSION: Primary | ICD-10-CM

## 2022-07-08 PROCEDURE — 99443 PR PHYS/QHP TELEPHONE EVALUATION 21-30 MIN: CPT | Performed by: NURSE PRACTITIONER

## 2022-07-08 RX ORDER — LOSARTAN POTASSIUM 50 MG/1
75 TABLET ORAL DAILY
Qty: 45 TABLET | Refills: 1 | Status: SHIPPED | OUTPATIENT
Start: 2022-07-08

## 2022-07-08 NOTE — PROGRESS NOTES
Mode of visit: Telephone  Location of patient: Home   You have chosen to receive care through the use of telemedicine. Telemedicine enables health care providers at different locations to provide safe, effective, and convenient care through the use of technology. As with any health care service, there are risks associated with the use of telemedicine, including equipment failure, poor connections, and  issues.  • Do you understand the risks and benefits of telemedicine as I have explained them to you? Yes  • Have your questions regarding telemedicine been answered? Yes  • Do you consent to the use of telemedicine in your medical care today? Yes      Chief Complaint  Hypertension    [facility]  [facility]  Telemedicine note    This was a telephone enabled telemedicine encounter.    Subjective    History of Present Illness {CC  Problem List  Visit  Diagnosis   Encounters  Notes  Medications  Labs  Result Review Imaging  Media :23}     Walter Herrera, 71 y.o. male with HTN, PAF s/p watchman, CAD, SSS s/p BSC PPM (2016), MONIQUE, history of CVA presents to [facility] telemedicine visit for Hypertension.    Patient presents for same-day telemedicine appointment for evaluation of labile blood pressure.  Patient reports that over the past week blood pressures have been quite labile at home; reports , 100, 156, 136, 139, 157, 168, 135; total average of reported .    He reports that otherwise he is doing well overall from a cardiovascular standpoint.  He is compliant with current medication therapy.  He denies significant tachypalpitation episodes, anginal pain, dyspnea, near-syncope/syncope.      Objective     Vital Signs:   Vitals:    07/08/22 1121   BP: 135/54   Pulse: 79     There is no height or weight on file to calculate BMI.  Physical Exam  Vitals reviewed.   Pulmonary:      Effort: Pulmonary effort is normal. No respiratory  distress.   Neurological:      Mental Status: He is alert and oriented to person, place, and time.   Psychiatric:         Mood and Affect: Mood normal.         Behavior: Behavior normal.         Thought Content: Thought content normal.        Data Reviewed:{ Labs  Result Review  Imaging  Med Tab  Media :23}     Adult Transesophageal Echo (RAMÓN) W/ Cont if Necessary Per Protocol (11/10/2021 10:19)  EP/CRM Study (09/27/2021 09:46)    CARDIOLOGY RESULTS - SCAN - [facility], [place], 05- (04/07/2022)      Assessment and Plan {CC Problem List  Visit Diagnosis  ROS  Review (Popup)  Health Maintenance  Quality  BestPractice  Medications  SmartSets  SnapShot Encounters  Media :23}     This visit has been scheduled as a telephone visit to comply with patient safety concerns in accordance with CDC recommendations. Time of discussion: 26 minutes.    1. Essential hypertension  -Reports labile blood pressures over the past week; total average of reported .  -Increase losartan to 75 Mg daily  -Follow-up in clinic in approximately 1 week for BP check, ambulatory blood pressure monitor placement to further evaluate reported labile blood pressure    2. Paroxysmal atrial fibrillation (HCC)  -Recent interrogation with 4% AF burden  -[id number]: 6  -Denies arrhythmia symptoms  -S/p watchman placement 2021  -Continue ASA in setting of Watchman device  -Follow-up with primary cardiology/electrophysiology as scheduled    3.  Coronary artery disease  -CABG x 2 1/2016  -Stress Test 10/11/19: normal myocardial perfusion imaging, EF normal without wall motion abnormality  -Denies anginal symptoms  -Continue ASA, rosuvastatin      Follow Up {Instructions Charge Capture  Follow-up Communications :23}   Return in about 6 days (around 7/14/2022) for Office follow-up, BP check, BP monitor.    Time Spent: I spent a total of 27 minutes caring for Walter Herrera on this date of service. This time includes time  spent by me in the following activities: preparing for the visit, reviewing tests, obtaining and/or reviewing a separately obtained history, performing a medically appropriate examination and/or evaluation, counseling and educating the patient/family/caregiver, documenting information in the medical record and independently interpreting results and communicating that information with the patient/family/caregiver. All time noted occurred on the date of service.    Patient was given instructions and counseling regarding his condition or for health maintenance advice. Please see specific information pulled into the AVS if appropriate.  Patient was instructed to call the Heart [facility] Val[facility] Center with any questions, concerns, or worsening symptoms.    *Please note that portions of this note were completed with a voice recognition program. Efforts were made to edit the dictations, but occasionally words are mistranscribed.

## 2022-07-08 NOTE — TELEPHONE ENCOUNTER
"Patient left message that he hasn't been feeling well \"bp up and down\".  Left message for pt to call me.    Olena Barcenas RN      "

## 2022-08-02 RX ORDER — HYDROGEN PEROXIDE 2.65 ML/100ML
LIQUID ORAL; TOPICAL
Qty: 90 TABLET | Refills: 3 | Status: SHIPPED | OUTPATIENT
Start: 2022-08-02

## 2022-08-03 ENCOUNTER — TELEPHONE (OUTPATIENT)
Dept: ENDOCRINOLOGY | Facility: CLINIC | Age: 72
End: 2022-08-03

## 2022-08-03 NOTE — TELEPHONE ENCOUNTER
----- Message from Misael Vidales MD sent at 8/3/2022 11:03 AM EDT -----      ----- Message -----  From: Moris Sheets RegSched Rep  Sent: 8/3/2022  10:47 AM EDT  To: Misael Vidales MD

## 2022-08-16 DIAGNOSIS — E11.49 TYPE 2 DIABETES MELLITUS WITH NEUROLOGICAL MANIFESTATIONS: ICD-10-CM

## 2022-08-16 RX ORDER — ROSUVASTATIN CALCIUM 10 MG/1
10 TABLET, COATED ORAL DAILY
Qty: 90 TABLET | Refills: 1 | Status: SHIPPED | OUTPATIENT
Start: 2022-08-16 | End: 2023-02-27

## 2022-08-16 RX ORDER — GABAPENTIN 400 MG/1
400 CAPSULE ORAL 3 TIMES DAILY
Qty: 270 CAPSULE | Refills: 1 | Status: SHIPPED | OUTPATIENT
Start: 2022-08-16

## 2022-08-16 NOTE — TELEPHONE ENCOUNTER
Pt called states neuropathy is really bad in his feet this has been going on since Sunday pt feet felt like iceberg cold Sunday morning when pt came home from [employer] his feet felt like they were on fire. Pt stated it's doing the same today pt had to sleep with socks on last night to keep feet warm. Pt last f/u 04/11/22 pt next f/u 09/21/22

## 2022-08-16 NOTE — TELEPHONE ENCOUNTER
We could increase gabapentin from 300mg TID to 400mg TID to see if this helps if he wants to try that.

## 2022-08-16 NOTE — TELEPHONE ENCOUNTER
PLEASE CALL IN A 90 DAY SUPPLY (-HE DOES NOT WANT TO GET OUT THAT MUCH)   ROSUVASTATIN  PT USES [facility] IN [place]     PLEASE CALL PT IF THIS IS A PROBLEM   [phone]

## 2022-09-01 RX ORDER — FLUTICASONE PROPIONATE 50 MCG
SPRAY, SUSPENSION (ML) NASAL
Qty: 16 G | Refills: 0 | Status: SHIPPED | OUTPATIENT
Start: 2022-09-01 | End: 2022-12-06 | Stop reason: SDUPTHER

## 2022-09-06 ENCOUNTER — TELEPHONE (OUTPATIENT)
Dept: ENDOCRINOLOGY | Facility: CLINIC | Age: 72
End: 2022-09-06

## 2022-09-06 NOTE — TELEPHONE ENCOUNTER
----- Message from Misael Vidales MD sent at 9/6/2022 10:56 AM EDT -----      ----- Message -----  From: Nadiya Avila  Sent: 9/6/2022  10:17 AM EDT  To: Misael Vidales MD

## 2022-09-08 ENCOUNTER — TELEPHONE (OUTPATIENT)
Dept: ENDOCRINOLOGY | Facility: CLINIC | Age: 72
End: 2022-09-08

## 2022-09-08 NOTE — TELEPHONE ENCOUNTER
Spoke with patient.  States last night his blood sugar dropped down to 48.  He drank a regular Coke, 2 glucose tablets and 2 pieces of dark chocolate.  This morning it was 144.  States this is the first time it has done this in a couple of months.

## 2022-09-21 ENCOUNTER — LAB (OUTPATIENT)
Dept: LAB | Facility: HOSPITAL | Age: 72
End: 2022-09-21

## 2022-09-21 ENCOUNTER — OFFICE VISIT (OUTPATIENT)
Dept: ENDOCRINOLOGY | Facility: CLINIC | Age: 72
End: 2022-09-21

## 2022-09-21 VITALS
HEIGHT: 71 IN | DIASTOLIC BLOOD PRESSURE: 62 MMHG | BODY MASS INDEX: 43.12 KG/M2 | OXYGEN SATURATION: 98 % | HEART RATE: 75 BPM | SYSTOLIC BLOOD PRESSURE: 130 MMHG | WEIGHT: 308 LBS

## 2022-09-21 DIAGNOSIS — E11.49 TYPE 2 DIABETES MELLITUS WITH NEUROLOGICAL MANIFESTATIONS: ICD-10-CM

## 2022-09-21 DIAGNOSIS — Z79.4 INSULIN LONG-TERM USE: ICD-10-CM

## 2022-09-21 DIAGNOSIS — E78.2 MIXED HYPERLIPIDEMIA: ICD-10-CM

## 2022-09-21 DIAGNOSIS — Z79.4 TYPE 2 DIABETES MELLITUS WITH HYPERGLYCEMIA, WITH LONG-TERM CURRENT USE OF INSULIN: Primary | ICD-10-CM

## 2022-09-21 DIAGNOSIS — I25.10 ATHEROSCLEROSIS OF NATIVE CORONARY ARTERY OF NATIVE HEART WITHOUT ANGINA PECTORIS: ICD-10-CM

## 2022-09-21 DIAGNOSIS — E11.65 TYPE 2 DIABETES MELLITUS WITH HYPERGLYCEMIA, WITH LONG-TERM CURRENT USE OF INSULIN: Primary | ICD-10-CM

## 2022-09-21 DIAGNOSIS — I10 ESSENTIAL HYPERTENSION: ICD-10-CM

## 2022-09-21 LAB
ALBUMIN SERPL-MCNC: 4 G/DL (ref 3.5–5.2)
ALBUMIN UR-MCNC: <1.2 MG/DL
ALBUMIN/GLOB SERPL: 1.7 G/DL
ALP SERPL-CCNC: 59 U/L (ref 39–117)
ALT SERPL W P-5'-P-CCNC: 18 U/L (ref 1–41)
ANION GAP SERPL CALCULATED.3IONS-SCNC: 7.7 MMOL/L (ref 5–15)
AST SERPL-CCNC: 15 U/L (ref 1–40)
BILIRUB SERPL-MCNC: 0.5 MG/DL (ref 0–1.2)
BUN SERPL-MCNC: 13 MG/DL (ref 8–23)
BUN/CREAT SERPL: 11.9 (ref 7–25)
CALCIUM SPEC-SCNC: 9.4 MG/DL (ref 8.6–10.5)
CHLORIDE SERPL-SCNC: 103 MMOL/L (ref 98–107)
CO2 SERPL-SCNC: 26.3 MMOL/L (ref 22–29)
CREAT SERPL-MCNC: 1.09 MG/DL (ref 0.76–1.27)
CREAT UR-MCNC: 29.9 MG/DL
EGFRCR SERPLBLD CKD-EPI 2021: 72.6 ML/MIN/1.73
EXPIRATION DATE: ABNORMAL
EXPIRATION DATE: NORMAL
GLOBULIN UR ELPH-MCNC: 2.4 GM/DL
GLUCOSE BLDC GLUCOMTR-MCNC: 295 MG/DL (ref 70–130)
GLUCOSE SERPL-MCNC: 241 MG/DL (ref 65–99)
HBA1C MFR BLD: 9.3 %
Lab: ABNORMAL
Lab: NORMAL
MICROALBUMIN/CREAT UR: NORMAL MG/G{CREAT}
POTASSIUM SERPL-SCNC: 4.8 MMOL/L (ref 3.5–5.2)
PROT SERPL-MCNC: 6.4 G/DL (ref 6–8.5)
SODIUM SERPL-SCNC: 137 MMOL/L (ref 136–145)
TSH SERPL DL<=0.05 MIU/L-ACNC: 4.47 UIU/ML (ref 0.27–4.2)

## 2022-09-21 PROCEDURE — 82947 ASSAY GLUCOSE BLOOD QUANT: CPT | Performed by: INTERNAL MEDICINE

## 2022-09-21 PROCEDURE — 83036 HEMOGLOBIN GLYCOSYLATED A1C: CPT | Performed by: INTERNAL MEDICINE

## 2022-09-21 PROCEDURE — 84443 ASSAY THYROID STIM HORMONE: CPT | Performed by: INTERNAL MEDICINE

## 2022-09-21 PROCEDURE — 3046F HEMOGLOBIN A1C LEVEL >9.0%: CPT | Performed by: INTERNAL MEDICINE

## 2022-09-21 PROCEDURE — 80053 COMPREHEN METABOLIC PANEL: CPT | Performed by: INTERNAL MEDICINE

## 2022-09-21 PROCEDURE — 82043 UR ALBUMIN QUANTITATIVE: CPT | Performed by: INTERNAL MEDICINE

## 2022-09-21 PROCEDURE — 82570 ASSAY OF URINE CREATININE: CPT | Performed by: INTERNAL MEDICINE

## 2022-09-21 PROCEDURE — 99214 OFFICE O/P EST MOD 30 MIN: CPT | Performed by: INTERNAL MEDICINE

## 2022-09-21 RX ORDER — SEMAGLUTIDE 1.34 MG/ML
0.5 INJECTION, SOLUTION SUBCUTANEOUS WEEKLY
Qty: 1.5 ML | Refills: 5 | Status: SHIPPED | OUTPATIENT
Start: 2022-09-21 | End: 2023-02-07 | Stop reason: DRUGHIGH

## 2022-09-21 NOTE — PROGRESS NOTES
"     Office Note      Date: 2022  Patient Name: Walter Herrera  MRN: [id number]  : 1950    Chief Complaint   Patient presents with   • Diabetes     Type II       History of Present Illness:   Walter Herrera is a 71 y.o. male who presents for Diabetes type 2. Diagnosed in: . Treated in past with oral agents. Current treatments: metformin and premix insulin. Number of insulin shots per day: 2. Checks blood sugar 288 times a day - on FreeStyle [employer] CGM. Has low blood sugar: rare. Aspirin use: Yes. Statin use: Yes. ACE-I/ARB use: Yes. Changes in health since last visit: none. Last eye exam 2022.    Subjective      Diabetic Complications:  Eyes: Yes - BDR  Kidneys: No  Feet: Yes - neuropathy  Heart: Yes -      Diet and Exercise:  Meals per day: 3  Minutes of exercise per week: 0 mins.    Review of Systems:   Review of Systems   Constitutional: Negative.    Cardiovascular: Negative.    Gastrointestinal: Negative.    Endocrine: Negative.        The following portions of the patient's history were reviewed and updated as appropriate: allergies, current medications, past family history, past medical history, past social history, past surgical history and problem list.    Objective       Visit Vitals  /62 (BP Location: Left arm, Patient Position: Sitting, Cuff Size: Adult)   Pulse 75   Ht 180.3 cm (71\")   Wt (!) 140 kg (308 lb)   SpO2 98%   BMI 42.96 kg/m²       Physical Exam:  Physical Exam  Constitutional:       Appearance: Normal appearance.   Neurological:      Mental Status: He is alert.         Labs:    HbA1c  Lab Results   Component Value Date    HGBA1C 9.3 2022       CMP  Lab Results   Component Value Date    GLUCOSE 241 (H) 2022    BUN 13 2022    CREATININE 1.09 2022    EGFRIFNONA 68 2021    BCR 11.9 2022    K 4.8 2022    CO2 26.3 2022    CALCIUM 9.4 2022    AST 15 2022    ALT 18 2022        Lipid Panel  Lab Results "   Component Value Date    HDL 31 (L) 11/05/2021    LDL 85 11/05/2021    TRIG 113 11/05/2021        TSH  Lab Results   Component Value Date    TSH 4.470 (H) 09/21/2022        Hemoglobin A1C  Lab Results   Component Value Date    HGBA1C 9.3 09/21/2022        Microalbumin/Creatinine  Lab Results   Component Value Date    MALBCRERATIO 49.2 11/05/2021    MICROALBUR 1.8 11/05/2021           Assessment / Plan      Assessment & Plan:  Diagnoses and all orders for this visit:    1. Type 2 diabetes mellitus with hyperglycemia, with long-term current use of insulin (HCC) (Primary)  Assessment & Plan:  Diabetes is unchanged.  A1c above goal despite large insulin doses and metformin.    Try adding GLP-1 RA.  Potential s/e discussed.  Diabetes will be reassessed in 3 months.    Orders:  -     Comprehensive Metabolic Panel; Future  -     Microalbumin / Creatinine Urine Ratio - Urine, Clean Catch; Future  -     TSH; Future  -     Comprehensive Metabolic Panel  -     Microalbumin / Creatinine Urine Ratio - Urine, Clean Catch  -     TSH    2. Type 2 diabetes mellitus with neurological manifestations (HCC)  -     POC Glucose, Blood  -     POC Glycosylated Hemoglobin (Hb A1C)    3. Essential hypertension  Assessment & Plan:  Hypertension is unchanged.  Continue current treatment regimen.  Blood pressure will be reassessed at the next regular appointment.      4. Mixed hyperlipidemia  Assessment & Plan:  Continue statin.      5. Atherosclerosis of native coronary artery of native heart without angina pectoris  Assessment & Plan:  Continue ASA and statin.  We discussed options including GLP-1 RA vs SGLT-2 inhibitor.  Try ozempic.  Look into pt assistance program.      6. Insulin long-term use (HCC)    Other orders  -     Semaglutide,0.25 or 0.5MG/DOS, (Ozempic, 0.25 or 0.5 MG/DOSE,) 2 MG/1.5ML solution pen-injector; Inject 0.5 mg under the skin into the appropriate area as directed 1 (One) Time Per Week.  Dispense: 1.5 mL; Refill:  5      Return in about 3 months (around 12/21/2022) for Recheck with A1c, TSH.    Misael Vidales MD   09/21/2022

## 2022-09-21 NOTE — ASSESSMENT & PLAN NOTE
Diabetes is unchanged.  A1c above goal despite large insulin doses and metformin.    Try adding GLP-1 RA.  Potential s/e discussed.  Diabetes will be reassessed in 3 months.

## 2022-09-21 NOTE — ASSESSMENT & PLAN NOTE
Continue ASA and statin.  We discussed options including GLP-1 RA vs SGLT-2 inhibitor.  Try ozempic.  Look into pt assistance program.

## 2022-09-22 ENCOUNTER — DOCUMENTATION (OUTPATIENT)
Dept: DIABETES SERVICES | Facility: HOSPITAL | Age: 72
End: 2022-09-22

## 2022-09-22 ENCOUNTER — PREP FOR SURGERY (OUTPATIENT)
Dept: OTHER | Facility: HOSPITAL | Age: 72
End: 2022-09-22

## 2022-09-22 DIAGNOSIS — Z95.818 PRESENCE OF WATCHMAN LEFT ATRIAL APPENDAGE CLOSURE DEVICE: ICD-10-CM

## 2022-09-22 DIAGNOSIS — I48.0 PAROXYSMAL ATRIAL FIBRILLATION: Primary | ICD-10-CM

## 2022-09-22 RX ORDER — SODIUM CHLORIDE 0.9 % (FLUSH) 0.9 %
10 SYRINGE (ML) INJECTION AS NEEDED
Status: CANCELLED | OUTPATIENT
Start: 2022-09-22

## 2022-09-22 RX ORDER — SODIUM CHLORIDE 0.9 % (FLUSH) 0.9 %
10 SYRINGE (ML) INJECTION EVERY 12 HOURS SCHEDULED
Status: CANCELLED | OUTPATIENT
Start: 2022-09-22

## 2022-09-29 ENCOUNTER — HOSPITAL ENCOUNTER (OUTPATIENT)
Dept: CARDIOLOGY | Facility: HOSPITAL | Age: 72
Discharge: HOME | End: 2022-09-29
Admitting: INTERNAL MEDICINE

## 2022-09-29 VITALS
OXYGEN SATURATION: 98 % | DIASTOLIC BLOOD PRESSURE: 89 MMHG | HEART RATE: 75 BPM | SYSTOLIC BLOOD PRESSURE: 155 MMHG | RESPIRATION RATE: 12 BRPM

## 2022-09-29 DIAGNOSIS — I48.0 PAROXYSMAL ATRIAL FIBRILLATION: ICD-10-CM

## 2022-09-29 DIAGNOSIS — Z95.818 PRESENCE OF WATCHMAN LEFT ATRIAL APPENDAGE CLOSURE DEVICE: ICD-10-CM

## 2022-09-29 LAB
BH CV VAS BP LEFT ARM: NORMAL MMHG
MAXIMAL PREDICTED HEART RATE: 149 BPM
STRESS TARGET HR: 127 BPM

## 2022-09-29 PROCEDURE — 93312 ECHO TRANSESOPHAGEAL: CPT | Performed by: INTERNAL MEDICINE

## 2022-09-29 PROCEDURE — 93312 ECHO TRANSESOPHAGEAL: CPT

## 2022-09-29 PROCEDURE — 93321 DOPPLER ECHO F-UP/LMTD STD: CPT

## 2022-09-29 PROCEDURE — 93325 DOPPLER ECHO COLOR FLOW MAPG: CPT | Performed by: INTERNAL MEDICINE

## 2022-09-29 PROCEDURE — 93325 DOPPLER ECHO COLOR FLOW MAPG: CPT

## 2022-09-29 PROCEDURE — 25010000002 MIDAZOLAM PER 1 MG: Performed by: INTERNAL MEDICINE

## 2022-09-29 RX ORDER — MIDAZOLAM HYDROCHLORIDE 1 MG/ML
INJECTION INTRAMUSCULAR; INTRAVENOUS
Status: COMPLETED | OUTPATIENT
Start: 2022-09-29 | End: 2022-09-29

## 2022-09-29 RX ADMIN — MIDAZOLAM 2 MG: 1 INJECTION INTRAMUSCULAR; INTRAVENOUS at 09:08

## 2022-09-29 RX ADMIN — MIDAZOLAM 1 MG: 1 INJECTION INTRAMUSCULAR; INTRAVENOUS at 09:12

## 2022-09-29 NOTE — H&P
[facility]   [address], [address]  [place], [place], [address] [phone]  [email or web address]           HISTORY AND PHYSICAL NOTE    Patient Care Team:  Patient Care Team:  Michael Mendiola MD as PCP - General (Internal Medicine)  Misael Vidales MD as Consulting Physician (Endocrinology)      Chief complaint: Atrial fibrillation         HPI:    Walter Herrera is a 71 y.o. male.    Cardiac focused problem list:  Paroxysmal atrial fibrillation  MEP6MM2-HSLy equals 6  On warfarin, used to be Eliquis but unable to afford due to cost  TTE 10/11/2019: EF 60%, moderate mitral annular calcification, LA normal in size.  Watchman device placement with Dr. Vergara, 9/27/2021  TTE 11/10/2021 for evaluation of watchman device  CAD  CABG x2, 1/2016  Stress test, 10/11/2019: Normal myocardial perfusion imaging, EF normal without wall motion abnormality  Sick sinus syndrome  BSC PPM implant, 4/2016  Diabetes mellitus type 2  Hypertension  Hyperlipidemia  MONIQUE  Compliant with BiPAP  History of CVA 6/2020  On warfarin with subtherapeutic INR  Surgical history  Cholecystectomy  CABG  Right shoulder surgery x2  Right and left inguinal hernia repair    Patient presents today for transesophageal echocardiogram to evaluate Watchman device.  He underwent placement of 27 mm FLX Watchman device with Dr. Vergara on 9/27/2021.  He suffered a CVA in June 2020 and is high risk for recurrent CVA. Previously on warfarin with labile INRs and side effects of GI upset.   Had a subtherapeutic INR at the time of his CVA.  Previously taken Eliquis which was discontinued due to cost. Currently on aspirin monotherapy.  Has occasional palpitations but feels his atrial fibrillation is well-controlled.  Denies chest pain, shortness of breath, lower extremity edema, lightheadedness or syncope.      Review of Systems:  Positive for occasional palpitations  All other systems reviewed and  are negative.    PFSH:  Patient Active Problem List   Diagnosis    Paroxysmal atrial fibrillation (HCC)    MONIQUE treated with BiPAP    Essential hypertension    History of CVA (cerebrovascular accident)    Uncontrolled type 2 diabetes mellitus with hyperglycemia (HCC)    Mixed hyperlipidemia    Atherosclerosis of native coronary artery of native heart without angina pectoris    Insulin long-term use (HCC)    Subacute thyroiditis    Type 2 diabetes mellitus with hyperglycemia, with long-term current use of insulin (HCC)    Type 2 diabetes mellitus with neurological manifestations (HCC)    Supratherapeutic INR       Current Outpatient Medications on File Prior to Encounter   Medication Sig Dispense Refill    acetaminophen (TYLENOL) 650 MG/20.3ML solution solution Take 650 mg by mouth Every 6 (Six) Hours As Needed for Mild Pain .      bumetanide (BUMEX) 2 MG tablet 1 tablet Daily As Needed.      EQ Aspirin Adult Low Dose 81 MG EC tablet Take 1 tablet by mouth once daily 90 tablet 3    fluticasone (FLONASE) 50 MCG/ACT nasal spray USE 2 SPRAY(S) IN EACH NOSTRIL DAILY AS DIRECTED BY PROVIDER 16 g 0    gabapentin (NEURONTIN) 400 MG capsule Take 1 capsule by mouth 3 (Three) Times a Day. 270 capsule 1    glucose blood (OneTouch Ultra) test strip Test blood sugar twice daily.  Dx E11.65 200 each 1    insulin NPH-insulin regular (humuLIN 70/30,novoLIN 70/30) (70-30) 100 UNIT/ML injection Inject  under the skin into the appropriate area as directed 2 (Two) Times a Day With Meals. 90 units am,18 units pm      Insulin Pen Needle (RELION PEN NEEDLE 31G/8MM) 31G X 8 MM misc Use twice daily. 200 each 1    Lancets (onetouch ultrasoft) lancets Test 2 times daily DX E11.65 200 each 3    losartan (COZAAR) 50 MG tablet Take 1.5 tablets by mouth Daily. 45 tablet 1    metFORMIN (GLUCOPHAGE) 500 MG tablet Take 1 tablet by mouth 3 (Three) Times a Day. 270 tablet 3    minocycline (MINOCIN,DYNACIN) 100 MG capsule Take 100 mg by mouth Daily.       Mirabegron ER (MYRBETRIQ) 25 MG tablet sustained-release 24 hour 24 hr tablet Take 25 mg by mouth Daily.      nitroglycerin (NITROSTAT) 0.4 MG SL tablet 1 under the tongue as needed for angina, may repeat q5mins for up three doses 100 tablet 11    potassium chloride (K-DUR,KLOR-CON) 20 MEQ CR tablet Take 1 tablet by mouth Daily As Needed (with bumex). 30 tablet 1    probenecid (BENEMID) 500 MG tablet Take 500 mg by mouth 2 (Two) Times a Day.      psyllium (METAMUCIL) 58.6 % packet Take 1 packet by mouth Daily.      rosuvastatin (CRESTOR) 10 MG tablet Take 1 tablet by mouth Daily. 90 tablet 1    Semaglutide,0.25 or 0.5MG/DOS, (Ozempic, 0.25 or 0.5 MG/DOSE,) 2 MG/1.5ML solution pen-injector Inject 0.5 mg under the skin into the appropriate area as directed 1 (One) Time Per Week. 1.5 mL 5     No current facility-administered medications on file prior to encounter.     Allergies   Allergen Reactions    Erythromycin Diarrhea    Morphine Unknown - Low Severity     Blisters    Penicillins Rash       Social History     Socioeconomic History    Marital status:    Tobacco Use    Smoking status: Former Smoker     Types: Pipe     Quit date: 1972     Years since quittin.0    Smokeless tobacco: Never Used   Vaping Use    Vaping Use: Never used   Substance and Sexual Activity    Alcohol use: Not Currently     Alcohol/week: 1.0 standard drink     Types: 1 Cans of beer per week    Drug use: Never    Sexual activity: Defer     Comment:       Family History   Problem Relation Age of Onset    Heart attack Mother     Heart disease Mother     Dementia Mother     Hepatitis Father     Heart disease Sister     Heart disease Brother     Heart disease Brother             Objective:     Vital Sign Min/Max for last 24 hours  No data recorded   No data recorded   No data recorded   No data recorded   No data recorded   No data recorded    No intake or output data in the 24 hours ending 22        There were no  vitals filed for this visit.    CONSTITUTIONAL: No acute distress  RESPIRATORY: Normal effort. Clear to auscultation bilaterally without wheezing or rales  CARDIOVASCULAR: Regular rate and rhythm with normal S1 and S2. Without murmur.  PERIPHERAL VASCULAR: Normal radial pulse. There is no lower extremity edema bilaterally.       Lab results reviewed by myself:  No results found for: CKTOTAL, CKMB, CKMBINDEX, TROPONINI, TROPONINT    Lab Results   Component Value Date    CHOL 137 11/05/2021     Lab Results   Component Value Date    TRIG 113 11/05/2021     Lab Results   Component Value Date    HDL 31 (L) 11/05/2021     Lab Results   Component Value Date    LDL 85 11/05/2021     No components found for: LDLDIRECTC    Diagnostic Data:    EKG 1/18/2022:  Atrial paced with Right BBB    Results for orders placed during the hospital encounter of 11/10/21    Adult Transesophageal Echo (RAMÓN) W/ Cont if Necessary Per Protocol    Interpretation Summary  · There is a 27 mm watchman FLX left atrial appendage occlusion device in place. The device appears well-seated. In the 90 degree view there is a small area of flow adjacent to the device measuring approximately 2.3 mm. This flow does not appear to communicate with the base of the appendage.  · Left ventricular ejection fraction appears to be 56 - 60%  · There is mild left ventricular hypertrophy.  · The left atrial cavity is mildly dilated  · Moderate mitral annular calcification is present. Trace mitral valve regurgitation is present  · No pericardial effusion.      Tele:  NSR         Assessment and Plan:     Problem list:    * No active hospital problems. *      ASSESSMENT:  PAF  CHADSVASc = 6  Status post 27 mm Watchman device implant with Dr. Vergara, 9/27/2021  CAD  CABG x 2, 1/2016  Sick sinus syndrome  Status post BSC PPM implant, 4/2016    PLAN:  Transesophageal echocardiogram today with Dr. Medina to evaluate previously placed Watchman device.   The risks, benefits,  and alternatives of the procedure have been reviewed and the patient wishes to proceed.   Further recommendations to follow procedure.    Electronically signed by HAMILTON Goldman, 09/29/22, 8:22 AM EDT.    Patient's watchman device is well-seated on RAMÓN.  There is no device associated thrombus, there is no significant peridevice flow.  Patient will continue on aspirin.  No post procedure complications noted.

## 2022-09-30 ENCOUNTER — TELEPHONE (OUTPATIENT)
Dept: ENDOCRINOLOGY | Facility: CLINIC | Age: 72
End: 2022-09-30

## 2022-09-30 NOTE — TELEPHONE ENCOUNTER
----- Message from Misael Vidales MD sent at 9/26/2022 12:21 PM EDT -----      ----- Message -----  From: Moris Sheets RegSched Rep  Sent: 9/26/2022  12:04 PM EDT  To: Misael Vidales MD

## 2022-10-17 ENCOUNTER — TELEPHONE (OUTPATIENT)
Dept: ENDOCRINOLOGY | Facility: CLINIC | Age: 72
End: 2022-10-17

## 2022-10-17 NOTE — TELEPHONE ENCOUNTER
PATIENT IS REQUESTING TO SPEAK WITH WHOMEVER WAS HELPING HIM WITH PATIENT ASSISTANCE FOR OZEMPIC. HE DOES NOT REMEMBER WHO IS WAS HE WAS SPEAKING WITH BUT WOULD LIKE TO SPEAK WITH SOMEONE ABOUT THIS.    CALL BACK [phone]

## 2022-10-17 NOTE — TELEPHONE ENCOUNTER
Spoke with patient.  He is going to contact Jajah as patient assistance gloria was faxed 09/27/2022.

## 2022-10-19 ENCOUNTER — TELEPHONE (OUTPATIENT)
Dept: ENDOCRINOLOGY | Facility: CLINIC | Age: 72
End: 2022-10-19

## 2022-10-19 NOTE — TELEPHONE ENCOUNTER
----- Message from Misael Vidales MD sent at 10/17/2022 12:47 PM EDT -----  Glucose levels better last several days.  Stay on same insulin dose for now.  ----- Message -----  From: Darcy Avila  Sent: 10/17/2022  11:51 AM EDT  To: Misael Vidales MD

## 2022-10-19 NOTE — TELEPHONE ENCOUNTER
BOONE pt. And advised. LOKESH   [FreeTextEntry6] : 21 day old male presents today grunting/congestion in his sleep all night long and spitting up occasionally. Patient is afebrile. He has not seemed ill and is eating well. He takes EBM about 2.75 oz and mom was thinking about increasing him to 3 oz per feeding since he is getting hungry every 2 hours now. He gained almost 1 lb since the visit with us on 2/10. He does not arch his back or turn reddish. Parents have tried gas drops but they do not seem to help. They have also tried gripe water one time. He spits up when he pushes down to pass stool and pass gas. Sometimes he will spit up hours after a feeding. He is not vomiting up his whole feeding, spits up small amounts.

## 2022-10-31 PROCEDURE — 93296 REM INTERROG EVL PM/IDS: CPT | Performed by: INTERNAL MEDICINE

## 2022-10-31 PROCEDURE — 93294 REM INTERROG EVL PM/LDLS PM: CPT | Performed by: INTERNAL MEDICINE

## 2022-11-04 ENCOUNTER — TELEPHONE (OUTPATIENT)
Dept: ENDOCRINOLOGY | Facility: CLINIC | Age: 72
End: 2022-11-04

## 2022-11-04 NOTE — TELEPHONE ENCOUNTER
----- Message from Misael Vidales MD sent at 11/3/2022  2:57 PM EDT -----  Some lows in the middle of the day.  Decrease AM insulin dose by 2u.  ----- Message -----  From: Nadiya Avila  Sent: 11/3/2022  10:56 AM EDT  To: Misael Vidales MD

## 2022-11-22 ENCOUNTER — TELEPHONE (OUTPATIENT)
Dept: ENDOCRINOLOGY | Facility: CLINIC | Age: 72
End: 2022-11-22

## 2022-11-22 NOTE — TELEPHONE ENCOUNTER
I called the patient and I explained that we didn't have a update.He told he that he will be taking his last dose of ozempic tommrow.I told him that if he did not hear anything by next week to call us

## 2022-11-22 NOTE — TELEPHONE ENCOUNTER
----- Message from Misael Vidales MD sent at 11/22/2022 12:57 PM EST -----  Glucose readings are variable but okay for now.  No change in insulin doses.  ----- Message -----  From: Darcy Avila  Sent: 11/22/2022  11:50 AM EST  To: Misael Vidales MD

## 2022-12-06 ENCOUNTER — TELEPHONE (OUTPATIENT)
Dept: ENDOCRINOLOGY | Facility: CLINIC | Age: 72
End: 2022-12-06

## 2022-12-06 RX ORDER — FLUTICASONE PROPIONATE 50 MCG
2 SPRAY, SUSPENSION (ML) NASAL DAILY
Qty: 16 G | Refills: 0 | Status: SHIPPED | OUTPATIENT
Start: 2022-12-06

## 2022-12-06 NOTE — TELEPHONE ENCOUNTER
Pt called and he would like a prescription fo flonase.He said he received an approval letter for patient assistance for ozempic pens.    Pt asks if his ozempic pens have arrived in our office yet.     Call pt:  [phone]      Narendra:  Phone: [phone] Fax: [phone]

## 2022-12-14 ENCOUNTER — TELEPHONE (OUTPATIENT)
Dept: ENDOCRINOLOGY | Facility: CLINIC | Age: 72
End: 2022-12-14

## 2022-12-14 NOTE — TELEPHONE ENCOUNTER
----- Message from Misael Vidales MD sent at 12/13/2022 11:37 AM EST -----  Glucose readings are okay.  No change in insulin at this time.  ----- Message -----  From: Darcy Avila  Sent: 12/13/2022  11:30 AM EST  To: Misael Vidales MD

## 2023-01-05 ENCOUNTER — TELEPHONE (OUTPATIENT)
Dept: ENDOCRINOLOGY | Facility: CLINIC | Age: 73
End: 2023-01-05
Payer: MEDICARE

## 2023-01-06 ENCOUNTER — TELEPHONE (OUTPATIENT)
Dept: ENDOCRINOLOGY | Facility: CLINIC | Age: 73
End: 2023-01-06
Payer: MEDICARE

## 2023-01-06 NOTE — TELEPHONE ENCOUNTER
----- Message from Misael Vidales MD sent at 1/6/2023  9:29 AM EST -----  Some higher readings around [place].  No change in meds.  Just try to get back on track with diet.  ----- Message -----  From: Darcy Avila  Sent: 1/6/2023   9:09 AM EST  To: Misael Vidales MD

## 2023-01-13 ENCOUNTER — TELEPHONE (OUTPATIENT)
Dept: ENDOCRINOLOGY | Facility: CLINIC | Age: 73
End: 2023-01-13
Payer: MEDICARE

## 2023-01-24 ENCOUNTER — TELEPHONE (OUTPATIENT)
Dept: ENDOCRINOLOGY | Facility: CLINIC | Age: 73
End: 2023-01-24
Payer: MEDICARE

## 2023-01-24 NOTE — TELEPHONE ENCOUNTER
----- Message from Misael Vidales MD sent at 1/18/2023  1:36 PM EST -----  Glucose readings are a bit above goal but he hasn't been on ozempic long enough to see the full effect.  Stay on same meds for now.  ----- Message -----  From: Nadiya Avila  Sent: 1/18/2023   1:06 PM EST  To: Misael Vidales MD

## 2023-02-02 ENCOUNTER — TELEPHONE (OUTPATIENT)
Dept: ENDOCRINOLOGY | Facility: CLINIC | Age: 73
End: 2023-02-02
Payer: MEDICARE

## 2023-02-02 NOTE — TELEPHONE ENCOUNTER
----- Message from Misael Vidales MD sent at 2/2/2023  1:43 PM EST -----  Let's increase the AM insulin dose by 2 units please.  ----- Message -----  From: Nadiya Avila  Sent: 2/2/2023  10:35 AM EST  To: Misale Vidales MD

## 2023-02-02 NOTE — TELEPHONE ENCOUNTER
Spoke to the patient and  advised him to increase am dosage by 2 units.    He states he is experiencing low glucose around 3am   He is not wanting to increase the dosage,     02/02/23  4am  Glucoxe 46  He checked with one touch meter and reading was 102    3pm 102    02/01/23 3am  Reading low  States he did not feel low    8am 82 5pm 110  01/31 4 am 66, 8am 90 5pm 59    He is using freestyle artemio and does not think it is working properly  Wants to discuss dexcom at next appt

## 2023-02-07 ENCOUNTER — OFFICE VISIT (OUTPATIENT)
Dept: ENDOCRINOLOGY | Facility: CLINIC | Age: 73
End: 2023-02-07
Payer: MEDICARE

## 2023-02-07 VITALS
OXYGEN SATURATION: 98 % | HEART RATE: 77 BPM | DIASTOLIC BLOOD PRESSURE: 68 MMHG | WEIGHT: 315 LBS | HEIGHT: 71 IN | BODY MASS INDEX: 44.1 KG/M2 | SYSTOLIC BLOOD PRESSURE: 124 MMHG

## 2023-02-07 DIAGNOSIS — Z79.4 INSULIN LONG-TERM USE: ICD-10-CM

## 2023-02-07 DIAGNOSIS — E11.65 TYPE 2 DIABETES MELLITUS WITH HYPERGLYCEMIA, WITH LONG-TERM CURRENT USE OF INSULIN: Primary | ICD-10-CM

## 2023-02-07 DIAGNOSIS — I10 ESSENTIAL HYPERTENSION: ICD-10-CM

## 2023-02-07 DIAGNOSIS — E11.49 TYPE 2 DIABETES MELLITUS WITH NEUROLOGICAL MANIFESTATIONS: ICD-10-CM

## 2023-02-07 DIAGNOSIS — I25.10 ATHEROSCLEROSIS OF NATIVE CORONARY ARTERY OF NATIVE HEART WITHOUT ANGINA PECTORIS: ICD-10-CM

## 2023-02-07 DIAGNOSIS — Z79.4 TYPE 2 DIABETES MELLITUS WITH HYPERGLYCEMIA, WITH LONG-TERM CURRENT USE OF INSULIN: Primary | ICD-10-CM

## 2023-02-07 DIAGNOSIS — E78.2 MIXED HYPERLIPIDEMIA: ICD-10-CM

## 2023-02-07 LAB
EXPIRATION DATE: NORMAL
EXPIRATION DATE: NORMAL
GLUCOSE BLDC GLUCOMTR-MCNC: 125 MG/DL (ref 70–130)
HBA1C MFR BLD: 8.4 %
Lab: NORMAL
Lab: NORMAL

## 2023-02-07 PROCEDURE — 83036 HEMOGLOBIN GLYCOSYLATED A1C: CPT | Performed by: INTERNAL MEDICINE

## 2023-02-07 PROCEDURE — 36415 COLL VENOUS BLD VENIPUNCTURE: CPT | Performed by: INTERNAL MEDICINE

## 2023-02-07 PROCEDURE — 99214 OFFICE O/P EST MOD 30 MIN: CPT | Performed by: INTERNAL MEDICINE

## 2023-02-07 PROCEDURE — 3052F HG A1C>EQUAL 8.0%<EQUAL 9.0%: CPT | Performed by: INTERNAL MEDICINE

## 2023-02-07 PROCEDURE — 82947 ASSAY GLUCOSE BLOOD QUANT: CPT | Performed by: INTERNAL MEDICINE

## 2023-02-07 RX ORDER — BISOPROLOL FUMARATE 5 MG/1
1 TABLET, FILM COATED ORAL DAILY
COMMUNITY
Start: 2023-01-12

## 2023-02-07 RX ORDER — SEMAGLUTIDE 1.34 MG/ML
1 INJECTION, SOLUTION SUBCUTANEOUS
Start: 2023-02-07

## 2023-02-07 NOTE — ASSESSMENT & PLAN NOTE
Diabetes is improving with treatment.  A1c significantly better.  Continue current treatment regimen.  But titrate up ozempic.  Continue to send in glucose readings for insulin adjustments.  Diabetes will be reassessed in 3 months.    He asks about getting DexCom.  Will try to send to DME company.

## 2023-02-07 NOTE — PROGRESS NOTES
"     Office Note      Date: 2023  Patient Name: Walter Herrera  MRN: [id number]  : 1950    Chief Complaint   Patient presents with   • Diabetes       History of Present Illness:   Walter Herrera is a 72 y.o. male who presents for Diabetes type 2. Diagnosed in: . Treated in past with oral agents. Current treatments: ozempic, metformin and premix insulin. Number of insulin shots per day: 2. Checks blood sugar 288 times a day - on FreeStyle [employer]ine[employer] CGM. Has low blood sugar: rare. Aspirin use: Yes. Statin use: Yes. ACE-I/ARB use: Yes. Changes in health since last visit: none. Last eye exam 2022.    Subjective      Diabetic Complications:  Eyes: Yes - BDR  Kidneys: No  Feet: Yes - neuropathy  Heart: Yes -      Diet and Exercise:  Meals per day: 3  Minutes of exercise per week: 0 mins.    Review of Systems:   Review of Systems   Constitutional: Negative.    Cardiovascular: Negative.    Gastrointestinal: Negative.    Endocrine: Negative.        The following portions of the patient's history were reviewed and updated as appropriate: allergies, current medications, past family history, past medical history, past social history, past surgical history and problem list.    Objective       Visit Vitals  /68   Pulse 77   Ht 180.3 cm (71\")   Wt (!) 145 kg (320 lb)   SpO2 98%   BMI 44.63 kg/m²       Physical Exam:  Physical Exam  Constitutional:       Appearance: Normal appearance.   Neurological:      Mental Status: He is alert.         Labs:    HbA1c  Lab Results   Component Value Date    HGBA1C 8.4 2023       CMP  Lab Results   Component Value Date    GLUCOSE 241 (H) 2022    BUN 13 2022    CREATININE 1.09 2022    EGFRIFNONA 68 2021    BCR 11.9 2022    K 4.8 2022    CO2 26.3 2022    CALCIUM 9.4 2022    AST 15 2022    ALT 18 2022        Lipid Panel  Lab Results   Component Value Date    HDL 31 (L) 2021    LDL 85 2021    TRIG 113 " 11/05/2021        TSH  Lab Results   Component Value Date    TSH 4.470 (H) 09/21/2022        Hemoglobin A1C  Lab Results   Component Value Date    HGBA1C 8.4 02/07/2023        Microalbumin/Creatinine  Lab Results   Component Value Date    MINAO  09/21/2022      Comment:      Unable to calculate    MICROALBUR <1.2 09/21/2022           Assessment / Plan      Assessment & Plan:  Diagnoses and all orders for this visit:    1. Type 2 diabetes mellitus with hyperglycemia, with long-term current use of insulin (HCC) (Primary)  Assessment & Plan:  Diabetes is improving with treatment.  A1c significantly better.  Continue current treatment regimen.  But titrate up ozempic.  Continue to send in glucose readings for insulin adjustments.  Diabetes will be reassessed in 3 months.    He asks about getting DexCom.  Will try to send to DME company.    Orders:  -     POC Glucose, Blood  -     POC Glycosylated Hemoglobin (Hb A1C)  -     TSH; Future    2. Type 2 diabetes mellitus with neurological manifestations (HCC)    3. Essential hypertension  Assessment & Plan:  Hypertension is improving with treatment.  Continue current treatment regimen.  Blood pressure will be reassessed at the next regular appointment.      4. Mixed hyperlipidemia  Assessment & Plan:  Continue statin.      5. Insulin long-term use ([facility])    6. Atherosclerosis of native coronary artery of native heart without angina pectoris  Assessment & Plan:  Continue GLP-1 RA, ASA and statin.      Other orders  -     Semaglutide, 1 MG/DOSE, (Ozempic, 1 MG/DOSE,) 4 MG/3ML solution pen-injector; Inject 1 mg under the skin into the appropriate area as directed Every 7 (Seven) Days.    Current Outpatient Medications   Medication Instructions   • acetaminophen (TYLENOL) 650 mg, Oral, Every 6 Hours PRN   • bisoprolol (ZEBeta) 5 MG tablet 1 tablet, Oral, Daily   • bumetanide (BUMEX) 2 MG tablet 1 tablet, Daily PRN   • EQ Aspirin Adult Low Dose 81 MG EC tablet Take 1 tablet  by mouth once daily   • fluticasone (FLONASE) 50 MCG/ACT nasal spray 2 sprays, Nasal, Daily   • gabapentin (NEURONTIN) 400 mg, Oral, 3 Times Daily   • glucose blood (OneTouch Ultra) test strip Test blood sugar twice daily.  Dx E11.65   • insulin NPH-insulin regular (humuLIN 70/30,novoLIN 70/30) (70-30) 100 UNIT/ML injection Subcutaneous, 2 Times Daily With Meals, 78 units am,20 units pm   • Insulin Pen Needle (RELION PEN NEEDLE 31G/8MM) 31G X 8 MM misc Use twice daily.   • Lancets (onetouch ultrasoft) lancets Test 2 times daily DX E11.65   • losartan (COZAAR) 75 mg, Oral, Daily   • metFORMIN (GLUCOPHAGE) 500 mg, Oral, 3 Times Daily   • minocycline (MINOCIN,DYNACIN) 100 mg, Oral, Daily   • Mirabegron ER (MYRBETRIQ) 25 mg, Oral, Daily   • nitroglycerin (NITROSTAT) 0.4 MG SL tablet 1 under the tongue as needed for angina, may repeat q5mins for up three doses   • Ozempic (1 MG/DOSE) 1 mg, Subcutaneous, Every 7 Days   • potassium chloride (K-DUR,KLOR-CON) 20 MEQ CR tablet 20 mEq, Oral, Daily PRN   • probenecid (BENEMID) 500 mg, Oral, 2 Times Daily   • psyllium (METAMUCIL) 58.6 % packet 1 packet, Oral, Daily   • rosuvastatin (CRESTOR) 10 mg, Oral, Daily      Return in about 3 months (around 5/7/2023) for Recheck with A1c.    Misael Vidales MD   02/07/2023

## 2023-02-10 ENCOUNTER — TELEPHONE (OUTPATIENT)
Dept: ENDOCRINOLOGY | Facility: CLINIC | Age: 73
End: 2023-02-10
Payer: MEDICARE

## 2023-02-10 NOTE — TELEPHONE ENCOUNTER
----- Message from Misael Vidales MD sent at 2/10/2023 12:04 PM EST -----  Let's decrease the AM insulin from 78 to 75u.  ----- Message -----  From: Nadiya Avila  Sent: 2/10/2023   9:57 AM EST  To: Misael Vidales MD

## 2023-02-27 ENCOUNTER — TELEPHONE (OUTPATIENT)
Dept: ENDOCRINOLOGY | Facility: CLINIC | Age: 73
End: 2023-02-27
Payer: MEDICARE

## 2023-02-27 RX ORDER — ROSUVASTATIN CALCIUM 10 MG/1
TABLET, COATED ORAL
Qty: 90 TABLET | Refills: 1 | Status: SHIPPED | OUTPATIENT
Start: 2023-02-27 | End: 2023-02-28 | Stop reason: SDUPTHER

## 2023-02-27 NOTE — TELEPHONE ENCOUNTER
Patient called wanting to check the status of his ozempic? He said he thought her was going to increase his dosage. Please advise.

## 2023-02-27 NOTE — TELEPHONE ENCOUNTER
----- Message from Misael Vidales MD sent at 2/27/2023 10:46 AM EST -----  Glucose readings are okay.  No change in insulin.  ----- Message -----  From: Darcy Avila  Sent: 2/27/2023  10:28 AM EST  To: Misael Vidales MD

## 2023-02-28 RX ORDER — ROSUVASTATIN CALCIUM 10 MG/1
10 TABLET, COATED ORAL DAILY
Qty: 90 TABLET | Refills: 1 | Status: SHIPPED | OUTPATIENT
Start: 2023-02-28

## 2023-02-28 NOTE — TELEPHONE ENCOUNTER
Patient states [facility] is unable to get rosuvastatin.  Patient request that rx be sent to [facility] in [place].

## 2023-03-16 ENCOUNTER — TELEPHONE (OUTPATIENT)
Dept: CARDIOLOGY | Facility: CLINIC | Age: 73
End: 2023-03-16
Payer: MEDICARE

## 2023-03-16 NOTE — TELEPHONE ENCOUNTER
Remote reading received showing afib with fast rates at times. Last f/u visit was Jan 2022.  Called to f/u with Mr Herrera on symptoms and to discuss f/u care.

## 2023-03-16 NOTE — TELEPHONE ENCOUNTER
Mr Herrera returned my call and he is following with Dr Claudio.  Will get remote readings switched to his office.

## 2023-03-27 ENCOUNTER — TELEPHONE (OUTPATIENT)
Dept: ENDOCRINOLOGY | Facility: CLINIC | Age: 73
End: 2023-03-27
Payer: MEDICARE

## 2023-03-27 NOTE — TELEPHONE ENCOUNTER
Spoke to pt - he had covid 2 weeks ago.  He states he looked back at bs's and has had 4 low bs's around that time in the past 2 weeks.  He wants to decrease am insulin to 64 units  Fyi.  He will continue to send it bs's

## 2023-03-27 NOTE — TELEPHONE ENCOUNTER
Spoke to pt -8am bs 192.  Took 70 units of 70/30 ate a bowl of cereal like he always does.  At 12:51pm bs 57-had diarrhea.  It came on very fast.  Drank a coke -bs came up to 140.  This is the 1st time its happened in a while

## 2023-03-27 NOTE — TELEPHONE ENCOUNTER
PT CALLED STATING HIS BS WENT TO 57 EARLIER TODAY. WHILE ON THE PHONE HE STATED HIS BS . HE REQUESTED A CALL BACK TO CONSULT.

## 2023-03-31 ENCOUNTER — TELEPHONE (OUTPATIENT)
Dept: ENDOCRINOLOGY | Facility: CLINIC | Age: 73
End: 2023-03-31
Payer: MEDICARE

## 2023-03-31 NOTE — TELEPHONE ENCOUNTER
----- Message from Misael Vidales MD sent at 3/29/2023 12:35 PM EDT -----  Glucose readings are okay.  No change in insulin.  ----- Message -----  From: Moris Sheets, IZABELLA  Sent: 3/29/2023  12:22 PM EDT  To: Misael Vidales MD

## 2023-04-07 ENCOUNTER — TELEPHONE (OUTPATIENT)
Dept: ENDOCRINOLOGY | Facility: CLINIC | Age: 73
End: 2023-04-07
Payer: MEDICARE

## 2023-04-26 DIAGNOSIS — I10 ESSENTIAL HYPERTENSION: ICD-10-CM

## 2023-04-26 RX ORDER — LOSARTAN POTASSIUM 50 MG/1
75 TABLET ORAL DAILY
Qty: 135 TABLET | Refills: 1 | Status: SHIPPED | OUTPATIENT
Start: 2023-04-26

## 2023-04-26 NOTE — TELEPHONE ENCOUNTER
Johnathon has been afebrile, VSS, LSC on RA. No complaints of pain or nausea. Not eating or drinking except to take oral meds. Good UOP. No stool. Will continue with POC and notify MD with updates.    Rx Refill Note  Requested Prescriptions     Pending Prescriptions Disp Refills   • losartan (COZAAR) 50 MG tablet 45 tablet 1     Sig: Take 1.5 tablets by mouth Daily.      Last office visit with prescribing clinician: 2/7/2023     Next office visit with prescribing clinician: 6/20/2023

## 2023-05-15 ENCOUNTER — TELEPHONE (OUTPATIENT)
Dept: ENDOCRINOLOGY | Facility: CLINIC | Age: 73
End: 2023-05-15
Payer: MEDICARE

## 2023-05-15 NOTE — TELEPHONE ENCOUNTER
----- Message from Misael Vidales MD sent at 5/12/2023  2:09 PM EDT -----  Let's increase the PM insulin from 18 to 19u.  ----- Message -----  From: Darcy Avila  Sent: 5/12/2023   1:08 PM EDT  To: Misael Vidales MD

## 2023-05-16 DIAGNOSIS — E11.49 TYPE 2 DIABETES MELLITUS WITH NEUROLOGICAL MANIFESTATIONS: ICD-10-CM

## 2023-05-16 RX ORDER — GABAPENTIN 400 MG/1
CAPSULE ORAL
Qty: 270 CAPSULE | Refills: 1 | Status: SHIPPED | OUTPATIENT
Start: 2023-05-16

## 2023-05-16 NOTE — TELEPHONE ENCOUNTER
Rx Refill Note  Requested Prescriptions     Pending Prescriptions Disp Refills   • gabapentin (NEURONTIN) 400 MG capsule [Pharmacy Med Name: Gabapentin 400 MG Oral Capsule] 270 capsule 0     Sig: TAKE 1 CAPSULE BY MOUTH THREE TIMES DAILY      Last office visit with prescribing clinician: 2/7/2023   Last telemedicine visit with prescribing clinician: 5/15/2023   Next office visit with prescribing clinician: 6/20/2023                         Would you like a call back once the refill request has been completed: [] Yes [] No    If the office needs to give you a call back, can they leave a voicemail: [] Yes [] No    Samuel Fan MA  05/16/23, 13:46 EDT

## 2023-05-23 RX ORDER — PROBENECID 500 MG/1
500 TABLET, FILM COATED ORAL 2 TIMES DAILY
Qty: 60 TABLET | Refills: 5 | Status: SHIPPED | OUTPATIENT
Start: 2023-05-23

## 2023-05-23 NOTE — TELEPHONE ENCOUNTER
Spoke to pt-advised we have never refilled this before.  He states the dr that did retired-asked is you would refill.  Its for gout.  Pended refill  Last visit 2/7/23  Next 6/20/23

## 2023-05-23 NOTE — TELEPHONE ENCOUNTER
Caller: WES RAGSDALE    Relationship: SELF    Best call back number:[phone]    Requested Prescriptions:   Requested Prescriptions      No prescriptions requested or ordered in this encounter    [facility]    Pharmacy where request should be sent:    [facility] [place]  Last office visit with prescribing clinician: 2/7/2023     Next office visit with prescribing clinician: 6/20/2023     Additional details provided by patient: 30 DAY SUPPLY    Does the patient have less than a 3 day supply:  [x] Yes  [] No    Would you like a call back once the refill request has been completed: [] Yes [x] No    If the office needs to give you a call back, can they leave a voicemail: [] Yes [x] No    Trinidad Frank Rep   05/23/23 09:09 EDT

## 2023-06-02 ENCOUNTER — TELEPHONE (OUTPATIENT)
Dept: ENDOCRINOLOGY | Facility: CLINIC | Age: 73
End: 2023-06-02
Payer: MEDICARE

## 2023-06-02 NOTE — TELEPHONE ENCOUNTER
Spoke to pt per RT she reviewed blood sugars - no changes.  He voiced understanding.    fyi he was in ER yesterday - he now has to see nephrrology.  Wanted me to let you know

## 2023-08-01 ENCOUNTER — TELEPHONE (OUTPATIENT)
Dept: ENDOCRINOLOGY | Facility: CLINIC | Age: 73
End: 2023-08-01
Payer: MEDICARE

## 2023-08-04 ENCOUNTER — PATIENT MESSAGE (OUTPATIENT)
Dept: ENDOCRINOLOGY | Facility: CLINIC | Age: 73
End: 2023-08-04
Payer: MEDICARE

## 2023-08-04 ENCOUNTER — TELEPHONE (OUTPATIENT)
Dept: ENDOCRINOLOGY | Facility: CLINIC | Age: 73
End: 2023-08-04
Payer: MEDICARE

## 2023-08-04 NOTE — TELEPHONE ENCOUNTER
Received paperwork from [employer]. Spoke with patient and he states that is where he gets his CGM supplies.  Records requested, submitted.

## 2023-08-17 ENCOUNTER — TELEPHONE (OUTPATIENT)
Dept: ENDOCRINOLOGY | Facility: CLINIC | Age: 73
End: 2023-08-17

## 2023-08-17 NOTE — TELEPHONE ENCOUNTER
Provider: DR ROCKY WALLACE  Caller: WES RAGSDALE   Relationship to Patient: SELF   Phone Number: [phone]  Reason for Call: PT CALLED AND STATED THAT HIS BLOOD SUGAR DROPPED ABOUT 1:15 AM TO 45 PT DRANK A COKE 1.5 BOTTLES OF COKE PEANUTBUTTER AND CRACKERS AND SOME CHOCOLATE AND IT STAYED . PT'S BLOOD SUGAR THIS MORNING  PT HAS EATEN SCRAMBLED EGGS AND TOAST

## 2023-08-24 ENCOUNTER — TELEPHONE (OUTPATIENT)
Dept: ENDOCRINOLOGY | Facility: CLINIC | Age: 73
End: 2023-08-24

## 2023-08-24 NOTE — TELEPHONE ENCOUNTER
Pt called stating he has had low BS readings around lunch time. He requested a call back to consult.

## 2023-08-24 NOTE — TELEPHONE ENCOUNTER
Spoke with patient.  He states that between lunch and dinner his blood sugar is dropping.  Today it was 61.  On 8/22/23 it was 61 and 8/20 it was 59.  He is currently using 68 units in the morning and 14 units in the evening.  He states his fasting blood sugars have been good and running 110-145.

## 2023-09-12 ENCOUNTER — TELEPHONE (OUTPATIENT)
Dept: ENDOCRINOLOGY | Facility: CLINIC | Age: 73
End: 2023-09-12
Payer: MEDICARE

## 2023-09-12 NOTE — TELEPHONE ENCOUNTER
----- Message from Misael Vidales MD sent at 9/11/2023  3:44 PM EDT -----  Glucose levels are variable.  No change in insulin doses at this time.  ----- Message -----  From: Moris Sheets PCT  Sent: 9/11/2023   2:50 PM EDT  To: Misael Vidales MD

## 2023-10-02 ENCOUNTER — OFFICE VISIT (OUTPATIENT)
Dept: ENDOCRINOLOGY | Facility: CLINIC | Age: 73
End: 2023-10-02
Payer: MEDICARE

## 2023-10-02 VITALS
HEIGHT: 71 IN | HEART RATE: 71 BPM | OXYGEN SATURATION: 97 % | WEIGHT: 302 LBS | DIASTOLIC BLOOD PRESSURE: 75 MMHG | BODY MASS INDEX: 42.28 KG/M2 | SYSTOLIC BLOOD PRESSURE: 122 MMHG

## 2023-10-02 DIAGNOSIS — E78.2 MIXED HYPERLIPIDEMIA: ICD-10-CM

## 2023-10-02 DIAGNOSIS — I10 ESSENTIAL HYPERTENSION: ICD-10-CM

## 2023-10-02 DIAGNOSIS — Z79.4 TYPE 2 DIABETES MELLITUS WITH HYPERGLYCEMIA, WITH LONG-TERM CURRENT USE OF INSULIN: Primary | ICD-10-CM

## 2023-10-02 DIAGNOSIS — E11.65 TYPE 2 DIABETES MELLITUS WITH HYPERGLYCEMIA, WITH LONG-TERM CURRENT USE OF INSULIN: Primary | ICD-10-CM

## 2023-10-02 DIAGNOSIS — E11.49 TYPE 2 DIABETES MELLITUS WITH NEUROLOGICAL MANIFESTATIONS: ICD-10-CM

## 2023-10-02 DIAGNOSIS — I25.10 ATHEROSCLEROSIS OF NATIVE CORONARY ARTERY OF NATIVE HEART WITHOUT ANGINA PECTORIS: ICD-10-CM

## 2023-10-02 LAB
ALBUMIN SERPL-MCNC: 4.3 G/DL (ref 3.5–5.2)
ALBUMIN UR-MCNC: <1.2 MG/DL
ALBUMIN/GLOB SERPL: 1.8 G/DL
ALP SERPL-CCNC: 60 U/L (ref 39–117)
ALT SERPL W P-5'-P-CCNC: 40 U/L (ref 1–41)
ANION GAP SERPL CALCULATED.3IONS-SCNC: 11 MMOL/L (ref 5–15)
AST SERPL-CCNC: 26 U/L (ref 1–40)
BILIRUB SERPL-MCNC: 0.8 MG/DL (ref 0–1.2)
BUN SERPL-MCNC: 13 MG/DL (ref 8–23)
BUN/CREAT SERPL: 13.4 (ref 7–25)
CALCIUM SPEC-SCNC: 9.8 MG/DL (ref 8.6–10.5)
CHLORIDE SERPL-SCNC: 102 MMOL/L (ref 98–107)
CHOLEST SERPL-MCNC: 126 MG/DL (ref 0–200)
CO2 SERPL-SCNC: 26 MMOL/L (ref 22–29)
CREAT SERPL-MCNC: 0.97 MG/DL (ref 0.76–1.27)
CREAT UR-MCNC: 71.2 MG/DL
EGFRCR SERPLBLD CKD-EPI 2021: 82.9 ML/MIN/1.73
EXPIRATION DATE: ABNORMAL
EXPIRATION DATE: NORMAL
GLOBULIN UR ELPH-MCNC: 2.4 GM/DL
GLUCOSE BLDC GLUCOMTR-MCNC: 204 MG/DL (ref 70–130)
GLUCOSE SERPL-MCNC: 183 MG/DL (ref 65–99)
HBA1C MFR BLD: 7.9 %
HDLC SERPL-MCNC: 34 MG/DL (ref 40–60)
LDLC SERPL CALC-MCNC: 71 MG/DL (ref 0–100)
LDLC/HDLC SERPL: 2.02 {RATIO}
Lab: ABNORMAL
Lab: NORMAL
MICROALBUMIN/CREAT UR: NORMAL MG/G{CREAT}
POTASSIUM SERPL-SCNC: 4.5 MMOL/L (ref 3.5–5.2)
PROT SERPL-MCNC: 6.7 G/DL (ref 6–8.5)
SODIUM SERPL-SCNC: 139 MMOL/L (ref 136–145)
TRIGL SERPL-MCNC: 117 MG/DL (ref 0–150)
TSH SERPL DL<=0.05 MIU/L-ACNC: 2.97 UIU/ML (ref 0.27–4.2)
VLDLC SERPL-MCNC: 21 MG/DL (ref 5–40)

## 2023-10-02 PROCEDURE — 1159F MED LIST DOCD IN RCRD: CPT | Performed by: INTERNAL MEDICINE

## 2023-10-02 PROCEDURE — 3078F DIAST BP <80 MM HG: CPT | Performed by: INTERNAL MEDICINE

## 2023-10-02 PROCEDURE — 84443 ASSAY THYROID STIM HORMONE: CPT | Performed by: INTERNAL MEDICINE

## 2023-10-02 PROCEDURE — 3051F HG A1C>EQUAL 7.0%<8.0%: CPT | Performed by: INTERNAL MEDICINE

## 2023-10-02 PROCEDURE — 82043 UR ALBUMIN QUANTITATIVE: CPT | Performed by: INTERNAL MEDICINE

## 2023-10-02 PROCEDURE — 3074F SYST BP LT 130 MM HG: CPT | Performed by: INTERNAL MEDICINE

## 2023-10-02 PROCEDURE — 80053 COMPREHEN METABOLIC PANEL: CPT | Performed by: INTERNAL MEDICINE

## 2023-10-02 PROCEDURE — 82947 ASSAY GLUCOSE BLOOD QUANT: CPT | Performed by: INTERNAL MEDICINE

## 2023-10-02 PROCEDURE — 1160F RVW MEDS BY RX/DR IN RCRD: CPT | Performed by: INTERNAL MEDICINE

## 2023-10-02 PROCEDURE — 82570 ASSAY OF URINE CREATININE: CPT | Performed by: INTERNAL MEDICINE

## 2023-10-02 PROCEDURE — 83036 HEMOGLOBIN GLYCOSYLATED A1C: CPT | Performed by: INTERNAL MEDICINE

## 2023-10-02 PROCEDURE — 99214 OFFICE O/P EST MOD 30 MIN: CPT | Performed by: INTERNAL MEDICINE

## 2023-10-02 PROCEDURE — 80061 LIPID PANEL: CPT | Performed by: INTERNAL MEDICINE

## 2023-10-02 PROCEDURE — 36415 COLL VENOUS BLD VENIPUNCTURE: CPT | Performed by: INTERNAL MEDICINE

## 2023-10-02 NOTE — PROGRESS NOTES
"     Office Note      Date: 10/02/2023  Patient Name: Walter Herrera  MRN: [id number]  : 1950    Chief Complaint   Patient presents with    Diabetes       History of Present Illness:   Walter Herrera is a 72 y.o. male who presents for Diabetes type 2. Diagnosed in: . Treated in past with oral agents. Current treatments: ozempic and premix insulin. Number of insulin shots per day: 2. Checks blood sugar 288 times a day - on Free[employer] CGM. Has low blood sugar: rare. Aspirin use: Yes. Statin use: Yes. ACE-I/ARB use: Yes. Changes in health since last visit: none. Last eye exam 3/2023.     Subjective      Diabetic Complications:  Eyes: Yes - BDR  Kidneys: No  Feet: Yes - neuropathy  Heart: Yes -     Diet and Exercise:  Meals per day: 3  Minutes of exercise per week: 0 mins.    Review of Systems:   Review of Systems   Constitutional: Negative.    Cardiovascular: Negative.    Gastrointestinal: Negative.    Endocrine: Negative.      The following portions of the patient's history were reviewed and updated as appropriate: allergies, current medications, past family history, past medical history, past social history, past surgical history, and problem list.    Objective     Visit Vitals  /75   Pulse 71   Ht 180.3 cm (71\")   Wt (!) 137 kg (302 lb)   SpO2 97%   BMI 42.12 kg/m²       Physical Exam:  Physical Exam  Constitutional:       Appearance: Normal appearance.   Cardiovascular:      Pulses:           Dorsalis pedis pulses are 2+ on the right side and 2+ on the left side.        Posterior tibial pulses are 2+ on the right side and 2+ on the left side.   Feet:      Right foot:      Protective Sensation: 5 sites tested.   1 site sensed.     Skin integrity: Skin integrity normal.      Toenail Condition: Right toenails are abnormally thick. Fungal disease present.     Left foot:      Protective Sensation: 5 sites tested.  5 sites sensed.      Skin integrity: Skin integrity normal.      Toenail Condition: " Left toenails are abnormally thick. Fungal disease present.  Neurological:      Mental Status: He is alert.       Labs:    HbA1c  Lab Results   Component Value Date    HGBA1C 7.9 10/02/2023       CMP  Lab Results   Component Value Date    GLUCOSE 241 (H) 09/21/2022    BUN 13 09/21/2022    CREATININE 1.09 09/21/2022    EGFRIFNONA 68 11/05/2021    BCR 11.9 09/21/2022    K 4.8 09/21/2022    CO2 26.3 09/21/2022    CALCIUM 9.4 09/21/2022    AST 15 09/21/2022    ALT 18 09/21/2022        Lipid Panel  Lab Results   Component Value Date    HDL 31 (L) 11/05/2021    LDL 85 11/05/2021    TRIG 113 11/05/2021        TSH  Lab Results   Component Value Date    TSH 4.470 (H) 09/21/2022        Hemoglobin A1C  Lab Results   Component Value Date    HGBA1C 7.9 10/02/2023        Microalbumin/Creatinine  Lab Results   Component Value Date    MALBCRERATIO  09/21/2022      Comment:      Unable to calculate    MICROALBUR <1.2 09/21/2022           Assessment / Plan      Assessment & Plan:  Diagnoses and all orders for this visit:    1. Type 2 diabetes mellitus with hyperglycemia, with long-term current use of insulin (Primary)  Assessment & Plan:  Diabetes is improving with treatment.  A1c acceptable.  Continue current treatment regimen.  But having some GI upset.  Will try to decrease ozempic from 2 to 1mg q week.  Continue to send in FSBS for insulin adjustments.  Diabetes will be reassessed in 3 months.    We set up FreeStyle [employer] CGM for sharing today.  We should be able to download this at the next visit.    Orders:  -     POC Glucose, Blood  -     POC Glycosylated Hemoglobin (Hb A1C)  -     Comprehensive Metabolic Panel; Future  -     Lipid Panel; Future  -     Microalbumin / Creatinine Urine Ratio - Urine, Clean Catch; Future  -     TSH; Future    2. Type 2 diabetes mellitus with neurological manifestations  Assessment & Plan:  Foot exam okay today.      3. Essential hypertension  Assessment & Plan:  Hypertension is  unchanged.  Continue current treatment regimen.  Blood pressure will be reassessed at the next regular appointment.      4. Mixed hyperlipidemia  Assessment & Plan:  Continue statin.  Check lipids.      5. Atherosclerosis of native coronary artery of native heart without angina pectoris  Assessment & Plan:  Continue ASA, statin and GLP-1 RA.        Current Outpatient Medications   Medication Instructions    acetaminophen (TYLENOL) 650 mg, Oral, Every 6 Hours PRN    bisoprolol (ZEBeta) 5 MG tablet 1 tablet, Oral, Daily    bumetanide (BUMEX) 2 MG tablet 1 tablet, Daily PRN    EQ Aspirin Adult Low Dose 81 MG EC tablet Take 1 tablet by mouth once daily    fluticasone (FLONASE) 50 MCG/ACT nasal spray 2 sprays, Nasal, Daily    gabapentin (NEURONTIN) 400 MG capsule TAKE 1 CAPSULE BY MOUTH THREE TIMES DAILY    glucose blood (OneTouch Ultra) test strip Test blood sugar twice daily.  Dx E11.65    insulin NPH-insulin regular (humuLIN 70/30,novoLIN 70/30) (70-30) 100 UNIT/ML injection Subcutaneous, 2 Times Daily With Meals, 68 units am,20 units pm    Insulin Pen Needle (RELION PEN NEEDLE 31G/8MM) 31G X 8 MM misc Use twice daily.    Lancets (onetouch ultrasoft) lancets Test 2 times daily DX E11.65    losartan (COZAAR) 75 mg, Oral, Daily    minocycline (MINOCIN,DYNACIN) 100 mg, Oral, Daily    Mirabegron ER (MYRBETRIQ) 25 mg, Oral, Daily    nitroglycerin (NITROSTAT) 0.4 MG SL tablet 1 under the tongue as needed for angina, may repeat q5mins for up three doses    Ozempic (1 MG/DOSE) 1 mg, Subcutaneous, Every 7 Days    potassium chloride (K-DUR,KLOR-CON) 20 MEQ CR tablet 20 mEq, Oral, Daily PRN    probenecid (BENEMID) 500 mg, Oral, 2 Times Daily    rosuvastatin (CRESTOR) 10 mg, Oral, Daily      Return in about 3 months (around 1/2/2024) for Recheck with A1c.    Misael Vidales MD   10/02/2023

## 2023-10-02 NOTE — ASSESSMENT & PLAN NOTE
Diabetes is improving with treatment.  A1c acceptable.  Continue current treatment regimen.  But having some GI upset.  Will try to decrease ozempic from 2 to 1mg q week.  Continue to send in FSBS for insulin adjustments.  Diabetes will be reassessed in 3 months.    We set up [employer] CGM for sharing today.  We should be able to download this at the next visit.

## 2023-10-13 ENCOUNTER — TELEPHONE (OUTPATIENT)
Dept: ENDOCRINOLOGY | Facility: CLINIC | Age: 73
End: 2023-10-13
Payer: MEDICARE

## 2023-10-13 NOTE — TELEPHONE ENCOUNTER
----- Message from Misael Vidales MD sent at 10/11/2023  4:28 PM EDT -----  Glucose readings are okay.  No change in insulin doses.  ----- Message -----  From: Nadiya Avila  Sent: 10/11/2023   4:27 PM EDT  To: Misael Vidales MD

## 2023-10-15 DIAGNOSIS — I10 ESSENTIAL HYPERTENSION: ICD-10-CM

## 2023-10-16 RX ORDER — LOSARTAN POTASSIUM 50 MG/1
75 TABLET ORAL DAILY
Qty: 135 TABLET | Refills: 0 | Status: SHIPPED | OUTPATIENT
Start: 2023-10-16

## 2023-10-16 NOTE — TELEPHONE ENCOUNTER
Rx Refill Note  Requested Prescriptions     Pending Prescriptions Disp Refills    losartan (COZAAR) 50 MG tablet [Pharmacy Med Name: Losartan Potassium 50 MG Oral Tablet] 135 tablet 0     Sig: TAKE 1 & 1/2 (ONE & ONE-HALF) TABLETS BY MOUTH ONCE DAILY      Last office visit with prescribing clinician: 10/2/2023   Last telemedicine visit with prescribing clinician: Visit date not found   Next office visit with prescribing clinician: 2/19/2024                         Would you like a call back once the refill request has been completed: [] Yes [] No    If the office needs to give you a call back, can they leave a voicemail: [] Yes [] No    Azul Funez MA  10/16/23, 10:02 EDT

## 2023-10-26 ENCOUNTER — TELEPHONE (OUTPATIENT)
Dept: ENDOCRINOLOGY | Facility: CLINIC | Age: 73
End: 2023-10-26
Payer: MEDICARE

## 2023-10-26 NOTE — TELEPHONE ENCOUNTER
----- Message from Misael Vidales MD sent at 10/26/2023  1:56 PM EDT -----  Glucose readings are okay.  No change in insulin doses at this time.  ----- Message -----  From: Nadiya Avila  Sent: 10/26/2023   1:21 PM EDT  To: Misael Vidales MD

## 2023-11-14 ENCOUNTER — TELEPHONE (OUTPATIENT)
Dept: ENDOCRINOLOGY | Facility: CLINIC | Age: 73
End: 2023-11-14
Payer: MEDICARE

## 2023-11-14 ENCOUNTER — TELEPHONE (OUTPATIENT)
Dept: ENDOCRINOLOGY | Facility: CLINIC | Age: 73
End: 2023-11-14

## 2023-11-22 ENCOUNTER — TELEPHONE (OUTPATIENT)
Dept: ENDOCRINOLOGY | Facility: CLINIC | Age: 73
End: 2023-11-22
Payer: MEDICARE

## 2023-11-27 RX ORDER — SEMAGLUTIDE 0.68 MG/ML
0.5 INJECTION, SOLUTION SUBCUTANEOUS WEEKLY
Start: 2023-11-27

## 2023-12-01 ENCOUNTER — TELEPHONE (OUTPATIENT)
Dept: ENDOCRINOLOGY | Facility: CLINIC | Age: 73
End: 2023-12-01
Payer: MEDICARE

## 2023-12-01 NOTE — TELEPHONE ENCOUNTER
----- Message from Misael Vidales MD sent at 12/1/2023 11:47 AM EST -----  Glucose levels are okay.  No change in insulin.  ----- Message -----  From: Nadiya Avila  Sent: 12/1/2023  11:04 AM EST  To: Misael Vidales MD

## 2023-12-20 ENCOUNTER — OFFICE VISIT (OUTPATIENT)
Dept: CARDIOLOGY | Facility: CLINIC | Age: 73
End: 2023-12-20
Payer: MEDICARE

## 2023-12-20 VITALS
HEART RATE: 76 BPM | DIASTOLIC BLOOD PRESSURE: 78 MMHG | BODY MASS INDEX: 43.26 KG/M2 | HEIGHT: 71 IN | WEIGHT: 309 LBS | OXYGEN SATURATION: 95 % | SYSTOLIC BLOOD PRESSURE: 130 MMHG

## 2023-12-20 DIAGNOSIS — G47.33 OSA TREATED WITH BIPAP: Primary | ICD-10-CM

## 2023-12-20 RX ORDER — SILDENAFIL 100 MG/1
1 TABLET, FILM COATED ORAL DAILY
COMMUNITY
Start: 2023-11-24

## 2023-12-20 RX ORDER — BISOPROLOL FUMARATE 10 MG/1
1 TABLET, FILM COATED ORAL DAILY
COMMUNITY
Start: 2023-11-12

## 2023-12-20 RX ORDER — TADALAFIL 5 MG/1
1 TABLET ORAL DAILY
COMMUNITY
Start: 2023-11-24

## 2023-12-20 RX ORDER — EZETIMIBE 10 MG/1
1 TABLET ORAL DAILY
COMMUNITY
Start: 2023-11-12

## 2023-12-20 NOTE — PROGRESS NOTES
Follow-Up Sleep Consult     Date:   2023  Name: Walter Herrera  :   1950  PCP: La Nena Ramirez MD    Chief Complaint   Patient presents with    Sleep Apnea       Subjective     History of Present Illness  Walter Herrera is a 72 y.o. male who presents today for follow-up on MONIQUE.  Today is his annual visit for his known history of very severe sleep apnea on BiPAP device.    He reports that since his last visit here that he has remarried and now has a raymundo wife Yolanda.  He continues to be active with his vocation as a .  He reports he continues to follow with his family physician and with his cardiologist Dr. Claudio in [place].    Sleep history:    MONIQUE baseline AHI 70 on 2017 split study.  CPAP failed to correct sleep apnea.  Titrated to BiPAP 14/10    Current MONIQUE therapy BiPAP 18/12 PS4      Current mask used is NP    Device Functioning Well: Yes  Mask Fit Comfortable: Yes  Air Flow Comfortable: Yes  DME Helpful for Supplies: Yes, but he keeps getting the wrong chin strap   Sleep is rested: Yes        Device Download:                     The patient's relevant past medical, surgical, family, and social history reviewed and updated in Epic as appropriate.    Past Medical History:   Diagnosis Date    Abnormal ECG     Arrhythmia     Arthritis     Atrial fibrillation     Benign essential hypertension     BiPAP (biphasic positive airway pressure) dependence     Blood clot in vein     Coronary artery disease     Coronary atherosclerosis     Depression     Diabetes mellitus     Disorder of nervous system     due to type 2 diabetes mellitus-Abstracted from [place]    Gout     Hypoglycemia due to type 2 diabetes mellitus     Long term current use of insulin     Measles     Mixed hyperlipidemia     Near syncope     Obesity     Sleep apnea     Stroke     Subacute thyroiditis     Type 2 diabetes mellitus, uncontrolled     Wears dentures     Wears glasses     Wears hearing aid in both  ears     sometimes     Past Surgical History:   Procedure Laterality Date    ATRIAL APPENDAGE EXCLUSION LEFT WITH TRANSESOPHAGEAL ECHOCARDIOGRAM N/A 9/27/2021    Procedure: Atrial Appendage Occlusion (9/27/21) on Warfarin DNS;  Surgeon: Derek Vergara MD;  Location: [facility] INVASIVE LOCATION;  Service: Cardiology;  Laterality: N/A;    BASAL CELL CARCINOMA EXCISION      CARDIAC CATHETERIZATION      [facility] 2003    CATARACT EXTRACTION Bilateral     CHOLECYSTECTOMY      COLONOSCOPY  2019    CORONARY ARTERY BYPASS GRAFT      [facility] 2015    CORONARY STENT PLACEMENT      [facility] 2003    HERNIA REPAIR      x2    INGUINAL HERNIA REPAIR      PACEMAKER IMPLANTATION      SHOULDER ROTATOR CUFF REPAIR      x2       Allergies   Allergen Reactions    Erythromycin Diarrhea    Morphine Unknown - Low Severity     Blisters    Penicillins Rash     Prior to Admission medications    Medication Sig Start Date End Date Taking? Authorizing Provider   acetaminophen (TYLENOL) 650 MG/20.3ML solution solution Take 20.3 mL by mouth Every 6 (Six) Hours As Needed for Mild Pain.   Yes Toña Hartman MD   B Complex Vitamins (B COMPLEX 100 PO) Take 1 tablet by mouth 2 (Two) Times a Day. as directed 11/24/23  Yes Toña Hartman MD   bisoprolol (ZEBeta) 10 MG tablet Take 1 tablet by mouth Daily. 11/12/23  Yes Toña Hartman MD   bumetanide (BUMEX) 2 MG tablet 1 tablet Daily As Needed.   Yes ProviderToña MD   EQ Aspirin Adult Low Dose 81 MG EC tablet Take 1 tablet by mouth once daily 8/2/22  Yes Derek Vergara MD   ezetimibe (ZETIA) 10 MG tablet Take 1 tablet by mouth Daily. 11/12/23  Yes Toña Hartman MD   fluticasone (FLONASE) 50 MCG/ACT nasal spray 2 sprays into the nostril(s) as directed by provider Daily. 12/6/22  Yes Misael Vidales MD   gabapentin (NEURONTIN) 400 MG capsule TAKE 1 CAPSULE BY MOUTH THREE TIMES DAILY 5/16/23  Yes Sobeida  Misael Patel MD   glucose blood (OneTouch Ultra) test strip Test blood sugar twice daily.  Dx E11.65 10/27/21  Yes Misael Vidales MD   insulin NPH-insulin regular (humuLIN 70/30,novoLIN 70/30) (70-30) 100 UNIT/ML injection Inject  under the skin into the appropriate area as directed 2 (Two) Times a Day With Meals. 68 units am,20 units pm   Yes ProviderToña MD   Insulin Pen Needle (RELION PEN NEEDLE 31G/8MM) 31G X 8 MM misc Use twice daily. 12/2/21  Yes Misael Vidales MD   Lancets (onetouch ultrasoft) lancets Test 2 times daily DX E11.65 1/28/22  Yes Misael Vidales MD   losartan (COZAAR) 50 MG tablet TAKE 1 & 1/2 (ONE & ONE-HALF) TABLETS BY MOUTH ONCE DAILY 10/16/23  Yes Lois Earl PA-C   minocycline (MINOCIN,DYNACIN) 100 MG capsule Take 1 capsule by mouth Daily.   Yes ProviderToña MD   nitroglycerin (NITROSTAT) 0.4 MG SL tablet 1 under the tongue as needed for angina, may repeat q5mins for up three doses 10/4/21  Yes Farnaz Lindsey APRN   potassium chloride (K-DUR,KLOR-CON) 20 MEQ CR tablet Take 1 tablet by mouth Daily As Needed (with bumex). 9/27/21  Yes Derek Vergara MD   probenecid (BENEMID) 500 MG tablet Take 1 tablet by mouth 2 (Two) Times a Day. 5/23/23  Yes Misael Vidales MD   rosuvastatin (CRESTOR) 10 MG tablet Take 1 tablet by mouth Daily. 2/28/23  Yes Misael Vidales MD   Semaglutide,0.25 or 0.5MG/DOS, (Ozempic, 0.25 or 0.5 MG/DOSE,) 2 MG/3ML solution pen-injector Inject 0.5 mg under the skin into the appropriate area as directed 1 (One) Time Per Week. 11/27/23  Yes Misael Vidales MD   sildenafil (VIAGRA) 100 MG tablet Take 1 tablet by mouth Daily. 11/24/23  Yes Provider, Historical, MD   tadalafil (CIALIS) 5 MG tablet Take 1 tablet by mouth Daily. 11/24/23  Yes Provider, Historical, MD   bisoprolol (ZEBeta) 5 MG tablet Take 1 tablet by mouth Daily. 1/12/23 12/20/23  Provider, Historical, MD   Mirabegron ER (MYRBETRIQ) 25 MG tablet  "sustained-release 24 hour 24 hr tablet Take 1 tablet by mouth Daily.  12/20/23  Provider, MD Toña     Family History   Problem Relation Age of Onset    Heart attack Mother     Heart disease Mother     Dementia Mother     Hepatitis Father     Heart disease Sister     Heart disease Brother     Heart disease Brother        Objective     Vital Signs:  /78   Pulse 76   Ht 180.3 cm (71\")   Wt (!) 140 kg (309 lb)   SpO2 95%   BMI 43.10 kg/m²              Physical Exam  HENT:      Head: Normocephalic.      Nose: Nose normal.      Mouth/Throat:      Mouth: Mucous membranes are moist.   Pulmonary:      Effort: Pulmonary effort is normal.   Skin:     General: Skin is warm and dry.   Neurological:      Mental Status: He is alert and oriented to person, place, and time.   Psychiatric:         Mood and Affect: Mood normal.         Behavior: Behavior normal.         Thought Content: Thought content normal.         The following data was reviewed by: HAMILTON Whitten on 12/20/2023:    PAP download reviewed: 11/19/2023 through 12/18/2023.  30-day download.  I have reviewed and interpreted the data on the download.         Assessment and Plan     Diagnoses and all orders for this visit:    1. MONIQUE treated with BiPAP (Primary)  Assessment & Plan:  Baseline AHI is 70.  This is very severe sleep apnea.    He is on BiPAP therapy.  Download is reviewed with good control and good compliance.    He is benefiting from BiPAP therapy and we plan to continue BiPAP therapy.    Prescription for PAP supplies to the DME of his choice.    Plan follow-up in 1 year or sooner for any MONIQUE or PAP concerns.    Orders:  -     PAP Therapy        Report if any new/changing symptoms immediately         Follow Up  Return in about 1 year (around 12/20/2024) for MONIQUE.  Patient was given instructions and counseling regarding his condition or for health maintenance advice. Please see specific information pulled into the AVS if " appropriate.

## 2023-12-20 NOTE — ASSESSMENT & PLAN NOTE
Baseline AHI is 70.  This is very severe sleep apnea.    He is on BiPAP therapy.  Download is reviewed with good control and good compliance.    He is benefiting from BiPAP therapy and we plan to continue BiPAP therapy.    Prescription for PAP supplies to the DME of his choice.    Plan follow-up in 1 year or sooner for any MONIQUE or PAP concerns.

## 2024-01-04 ENCOUNTER — TELEPHONE (OUTPATIENT)
Dept: ENDOCRINOLOGY | Facility: CLINIC | Age: 74
End: 2024-01-04
Payer: MEDICARE

## 2024-01-05 ENCOUNTER — TELEPHONE (OUTPATIENT)
Dept: ENDOCRINOLOGY | Facility: CLINIC | Age: 74
End: 2024-01-05
Payer: MEDICARE

## 2024-01-11 DIAGNOSIS — I10 ESSENTIAL HYPERTENSION: ICD-10-CM

## 2024-01-11 RX ORDER — LOSARTAN POTASSIUM 50 MG/1
75 TABLET ORAL DAILY
Qty: 135 TABLET | Refills: 3 | Status: SHIPPED | OUTPATIENT
Start: 2024-01-11

## 2024-01-11 NOTE — TELEPHONE ENCOUNTER
Rx Refill Note  Requested Prescriptions     Pending Prescriptions Disp Refills    losartan (COZAAR) 50 MG tablet [Pharmacy Med Name: Losartan Potassium 50 MG Oral Tablet] 135 tablet 0     Sig: TAKE 1 & 1/2 (ONE & ONE-HALF) TABLETS BY MOUTH ONCE DAILY      Last office visit with prescribing clinician: Visit date not found      Next office visit with prescribing clinician: Visit date not found                  Adelina Alvares MA  01/11/24, 08:30 EST

## 2024-01-26 RX ORDER — ROSUVASTATIN CALCIUM 10 MG/1
10 TABLET, COATED ORAL DAILY
Qty: 30 TABLET | Refills: 0 | Status: SHIPPED | OUTPATIENT
Start: 2024-01-26

## 2024-01-26 NOTE — TELEPHONE ENCOUNTER
Rx Refill Note  Requested Prescriptions     Pending Prescriptions Disp Refills    rosuvastatin (CRESTOR) 10 MG tablet [Pharmacy Med Name: Rosuvastatin Calcium 10 MG Oral Tablet] 30 tablet 0     Sig: Take 1 tablet by mouth once daily    Refill submitted until appt  Last office visit with prescribing clinician: 10/2/2023   Last telemedicine visit with prescribing clinician: Visit date not found   Next office visit with prescribing clinician: 2/19/2024                         Would you like a call back once the refill request has been completed: [] Yes [] No    If the office needs to give you a call back, can they leave a voicemail: [] Yes [] No    Azul Funez MA  01/26/24, 09:47 EST

## 2024-02-02 ENCOUNTER — TELEPHONE (OUTPATIENT)
Dept: ENDOCRINOLOGY | Facility: CLINIC | Age: 74
End: 2024-02-02
Payer: MEDICARE

## 2024-02-02 NOTE — TELEPHONE ENCOUNTER
----- Message from Misael Vidales MD sent at 2/2/2024  2:58 PM EST -----  Having some higher readings but hopefully this should improve as the URI clears.  No change in insulin at this time.  ----- Message -----  From: Nadiya Avila  Sent: 2/2/2024   2:08 PM EST  To: Misael Vidales MD

## 2024-02-15 ENCOUNTER — TELEPHONE (OUTPATIENT)
Dept: ENDOCRINOLOGY | Facility: CLINIC | Age: 74
End: 2024-02-15
Payer: MEDICARE

## 2024-02-19 ENCOUNTER — OFFICE VISIT (OUTPATIENT)
Dept: ENDOCRINOLOGY | Facility: CLINIC | Age: 74
End: 2024-02-19
Payer: MEDICARE

## 2024-02-19 VITALS
OXYGEN SATURATION: 98 % | SYSTOLIC BLOOD PRESSURE: 136 MMHG | HEART RATE: 75 BPM | DIASTOLIC BLOOD PRESSURE: 88 MMHG | BODY MASS INDEX: 43.79 KG/M2 | WEIGHT: 312.8 LBS | HEIGHT: 71 IN

## 2024-02-19 DIAGNOSIS — E11.49 TYPE 2 DIABETES MELLITUS WITH NEUROLOGICAL MANIFESTATIONS: ICD-10-CM

## 2024-02-19 DIAGNOSIS — Z79.4 TYPE 2 DIABETES MELLITUS WITH HYPERGLYCEMIA, WITH LONG-TERM CURRENT USE OF INSULIN: Primary | ICD-10-CM

## 2024-02-19 DIAGNOSIS — E06.1 SUBACUTE THYROIDITIS: ICD-10-CM

## 2024-02-19 DIAGNOSIS — E78.2 MIXED HYPERLIPIDEMIA: ICD-10-CM

## 2024-02-19 DIAGNOSIS — E11.65 TYPE 2 DIABETES MELLITUS WITH HYPERGLYCEMIA, WITH LONG-TERM CURRENT USE OF INSULIN: Primary | ICD-10-CM

## 2024-02-19 DIAGNOSIS — I10 ESSENTIAL HYPERTENSION: ICD-10-CM

## 2024-02-19 DIAGNOSIS — I25.10 ATHEROSCLEROSIS OF NATIVE CORONARY ARTERY OF NATIVE HEART WITHOUT ANGINA PECTORIS: ICD-10-CM

## 2024-02-19 LAB
EXPIRATION DATE: ABNORMAL
EXPIRATION DATE: ABNORMAL
GLUCOSE BLDC GLUCOMTR-MCNC: 174 MG/DL (ref 70–130)
HBA1C MFR BLD: 7.9 % (ref 4.5–5.7)
Lab: ABNORMAL
Lab: ABNORMAL

## 2024-02-19 PROCEDURE — 3051F HG A1C>EQUAL 7.0%<8.0%: CPT | Performed by: INTERNAL MEDICINE

## 2024-02-19 PROCEDURE — 82947 ASSAY GLUCOSE BLOOD QUANT: CPT | Performed by: INTERNAL MEDICINE

## 2024-02-19 PROCEDURE — 99214 OFFICE O/P EST MOD 30 MIN: CPT | Performed by: INTERNAL MEDICINE

## 2024-02-19 PROCEDURE — 3079F DIAST BP 80-89 MM HG: CPT | Performed by: INTERNAL MEDICINE

## 2024-02-19 PROCEDURE — 3075F SYST BP GE 130 - 139MM HG: CPT | Performed by: INTERNAL MEDICINE

## 2024-02-19 PROCEDURE — 83036 HEMOGLOBIN GLYCOSYLATED A1C: CPT | Performed by: INTERNAL MEDICINE

## 2024-02-19 NOTE — ASSESSMENT & PLAN NOTE
Diabetes is stable.   Continue current treatment regimen.  Diabetes will be reassessed in 6 months.    A1c acceptable.  Try to avoid hypoglycemia given cardiac history.

## 2024-02-19 NOTE — ASSESSMENT & PLAN NOTE
Hypertension is stable and controlled  Continue current treatment regimen.  Blood pressure will be reassessed in 6 months.

## 2024-02-19 NOTE — PROGRESS NOTES
"     Office Note      Date: 2024  Patient Name: Walter Herrera  MRN: [id number]  : 1950    Chief Complaint   Patient presents with    Diabetes       History of Present Illness:   Walter Herrera is a 73 y.o. male who presents for Diabetes type 2. Diagnosed in: . Treated in past with oral agents. Current treatments: ozempic and premix insulin. Number of insulin shots per day: 2. Checks blood sugar 288 times a day - on FreeStyle Destiney CGM. Has low blood sugar: rare. Aspirin use: Yes. Statin use: Yes. ACE-I/ARB use: Yes. Changes in health since last visit: none. Last eye exam 10/2023.    Subjective      Diabetic Complications:  Eyes: Yes - BDR  Kidneys: No  Feet: Yes - neuropathy  Heart: Yes -     Diet and Exercise:  Meals per day: 3  Minutes of exercise per week: 0 mins.    Review of Systems:   Review of Systems   Constitutional: Negative.    Cardiovascular: Negative.    Gastrointestinal: Negative.    Endocrine: Negative.        The following portions of the patient's history were reviewed and updated as appropriate: allergies, current medications, past family history, past medical history, past social history, past surgical history, and problem list.    Objective     Visit Vitals  /88   Pulse 75   Ht 180.3 cm (71\")   Wt (!) 142 kg (312 lb 12.8 oz)   SpO2 98%   BMI 43.63 kg/m²       Physical Exam:  Physical Exam  Constitutional:       Appearance: Normal appearance.   Neurological:      Mental Status: He is alert.         Labs:    HbA1c  Lab Results   Component Value Date    HGBA1C 7.9 (A) 2024       CMP  Lab Results   Component Value Date    GLUCOSE 183 (H) 10/02/2023    BUN 13 10/02/2023    CREATININE 0.97 10/02/2023    EGFRIFNONA 68 2021    BCR 13.4 10/02/2023    K 4.5 10/02/2023    CO2 26.0 10/02/2023    CALCIUM 9.8 10/02/2023    AST 26 10/02/2023    ALT 40 10/02/2023        Lipid Panel  Lab Results   Component Value Date    HDL 34 (L) 10/02/2023    LDL 71 10/02/2023    TRIG 117 " 10/02/2023        TSH  Lab Results   Component Value Date    TSH 2.970 10/02/2023        Hemoglobin A1C  Lab Results   Component Value Date    HGBA1C 7.9 (A) 02/19/2024        Microalbumin/Creatinine  Lab Results   Component Value Date    MALBCREJACETIO  10/02/2023      Comment:      Unable to calculate    MICROALBUR <1.2 10/02/2023           Assessment / Plan      Assessment & Plan:  Diagnoses and all orders for this visit:    1. Type 2 diabetes mellitus with hyperglycemia, with long-term current use of insulin (Primary)  Assessment & Plan:  Diabetes is stable.   Continue current treatment regimen.  Diabetes will be reassessed in 6 months.    A1c acceptable.  Try to avoid hypoglycemia given cardiac history.    Orders:  -     POC Glycosylated Hemoglobin (Hb A1C)  -     POC Glucose, Blood    2. Essential hypertension  Assessment & Plan:  Hypertension is stable and controlled  Continue current treatment regimen.  Blood pressure will be reassessed in 6 months.      3. Mixed hyperlipidemia  Assessment & Plan:  Continue statin and ezetimibe.  Lipids okay last visit.      4. Subacute thyroiditis  Assessment & Plan:  TSH normal last visit.      5. Atherosclerosis of native coronary artery of native heart without angina pectoris  Assessment & Plan:  Continue ASA, statin, ezetimibe and GLP-1 RA.      6. Type 2 diabetes mellitus with neurological manifestations      Current Outpatient Medications   Medication Instructions    acetaminophen (TYLENOL) 650 mg, Oral, Every 6 Hours PRN    B Complex Vitamins (B COMPLEX 100 PO) 1 tablet, Oral, 2 Times Daily, as directed    bisoprolol (ZEBeta) 10 MG tablet 1 tablet, Oral, Daily    bumetanide (BUMEX) 2 MG tablet 1 tablet, Daily PRN    Continuous Blood Gluc Sensor (FreeStyle Destiney 2 Sensor) misc Does not apply    EQ Aspirin Adult Low Dose 81 MG EC tablet Take 1 tablet by mouth once daily    ezetimibe (ZETIA) 10 MG tablet 1 tablet, Oral, Daily    fluticasone (FLONASE) 50 MCG/ACT nasal  spray 2 sprays, Nasal, Daily    gabapentin (NEURONTIN) 400 MG capsule TAKE 1 CAPSULE BY MOUTH THREE TIMES DAILY    glucose blood (OneTouch Ultra) test strip Test blood sugar twice daily.  Dx E11.65    insulin NPH-insulin regular (humuLIN 70/30,novoLIN 70/30) (70-30) 100 UNIT/ML injection Subcutaneous, 2 Times Daily With Meals, 68 units am,20 units pm    Insulin Pen Needle (RELION PEN NEEDLE 31G/8MM) 31G X 8 MM misc Use twice daily.    Lancets (onetouch ultrasoft) lancets Test 2 times daily DX E11.65    losartan (COZAAR) 75 mg, Oral, Daily    minocycline (MINOCIN,DYNACIN) 100 mg, Oral, Daily    nitroglycerin (NITROSTAT) 0.4 MG SL tablet 1 under the tongue as needed for angina, may repeat q5mins for up three doses    Ozempic (0.25 or 0.5 MG/DOSE) 0.5 mg, Subcutaneous, Weekly    potassium chloride (K-DUR,KLOR-CON) 20 MEQ CR tablet 20 mEq, Oral, Daily PRN    probenecid (BENEMID) 500 mg, Oral, 2 Times Daily    rosuvastatin (CRESTOR) 10 mg, Oral, Daily    sildenafil (VIAGRA) 100 MG tablet 1 tablet, Oral, Daily    tadalafil (CIALIS) 5 MG tablet 1 tablet, Oral, Daily      Return in about 3 months (around 5/19/2024) for Recheck with A1c.    Misael Vidales MD   02/19/2024

## 2024-02-23 ENCOUNTER — TELEPHONE (OUTPATIENT)
Dept: ENDOCRINOLOGY | Facility: CLINIC | Age: 74
End: 2024-02-23
Payer: MEDICARE

## 2024-02-23 NOTE — TELEPHONE ENCOUNTER
Pt called states he can no longer afford Benemid 500 mg too pricey. Is there another medication that can be called in. Pt would like to be notified. Please send prescription to [facility] in [place]

## 2024-03-07 ENCOUNTER — TELEPHONE (OUTPATIENT)
Dept: ENDOCRINOLOGY | Facility: CLINIC | Age: 74
End: 2024-03-07
Payer: MEDICARE

## 2024-03-07 NOTE — TELEPHONE ENCOUNTER
----- Message from Misael Vidales MD sent at 3/7/2024  3:03 PM EST -----  Glucose levels are okay.  No change in insulin doses at this time.  ----- Message -----  From: Nadiya Avila  Sent: 3/7/2024   2:58 PM EST  To: Misael Vidales MD

## 2024-03-28 DIAGNOSIS — E11.49 TYPE 2 DIABETES MELLITUS WITH NEUROLOGICAL MANIFESTATIONS: ICD-10-CM

## 2024-03-28 RX ORDER — GABAPENTIN 400 MG/1
CAPSULE ORAL
Qty: 270 CAPSULE | Refills: 1 | Status: SHIPPED | OUTPATIENT
Start: 2024-03-28

## 2024-03-28 NOTE — TELEPHONE ENCOUNTER
Rx Refill Note  Requested Prescriptions     Pending Prescriptions Disp Refills    gabapentin (NEURONTIN) 400 MG capsule [Pharmacy Med Name: Gabapentin 400 MG Oral Capsule] 270 capsule 0     Sig: TAKE 1 CAPSULE BY MOUTH THREE TIMES DAILY      Last office visit with prescribing clinician: 2/19/2024   Last telemedicine visit with prescribing clinician: Visit date not found   Next office visit with prescribing clinician: 6/25/2024                         Would you like a call back once the refill request has been completed: [] Yes [] No    If the office needs to give you a call back, can they leave a voicemail: [] Yes [] No    Azul Funez MA  03/28/24, 14:37 EDT

## 2024-04-03 ENCOUNTER — TELEPHONE (OUTPATIENT)
Dept: ENDOCRINOLOGY | Facility: CLINIC | Age: 74
End: 2024-04-03

## 2024-04-03 NOTE — TELEPHONE ENCOUNTER
----- Message from Misael Vidales MD sent at 4/3/2024  2:11 PM EDT -----  Glucose levels are okay.  No change in insulin at this time.  ----- Message -----  From: Darcy Avila  Sent: 4/3/2024   2:09 PM EDT  To: Misael Vidales MD

## 2024-04-03 NOTE — TELEPHONE ENCOUNTER
Spoke to pt-he voiced understanding.  He wanted you to know he has been c/o diarrhea for the past 2-3 months.  He didn't take ozempic this past Friday and no diarrhea!  He plans on taking his ozempic this Friday -will keep you posted.

## 2024-04-30 ENCOUNTER — TELEPHONE (OUTPATIENT)
Dept: ENDOCRINOLOGY | Facility: CLINIC | Age: 74
End: 2024-04-30
Payer: MEDICARE

## 2024-05-09 ENCOUNTER — TELEPHONE (OUTPATIENT)
Dept: ENDOCRINOLOGY | Facility: CLINIC | Age: 74
End: 2024-05-09
Payer: MEDICARE

## 2024-05-22 ENCOUNTER — TELEPHONE (OUTPATIENT)
Dept: ENDOCRINOLOGY | Facility: CLINIC | Age: 74
End: 2024-05-22
Payer: MEDICARE

## 2024-05-22 NOTE — TELEPHONE ENCOUNTER
"LVM for patient to return our call in regards to his insulin dose being changed.   Per Dr Misael Vidalse \"Let's decrease insulin to 45u AM and 5u PM.\"  "

## 2024-05-30 ENCOUNTER — TELEPHONE (OUTPATIENT)
Dept: ENDOCRINOLOGY | Facility: CLINIC | Age: 74
End: 2024-05-30

## 2024-05-30 NOTE — TELEPHONE ENCOUNTER
PT CALLED STATING BS WAS RUNNING 350 LAST NIGHT. 286 TODAY. HE STATED HE TOOK 44 UNITS IN THE MORN AND 4 IN THE EVENING. HE REQUESTED A CALL BACK TO CONSULT.

## 2024-05-31 NOTE — TELEPHONE ENCOUNTER
Called and spoke with patient.  He states his blood sugar has been running high.     5/31/24-831am- 214  5/30/24-755am- 214        230pm- 286       Dinner- 247  5/29/24-650am- 213        511pm- 192  5/28/24-649am- 119        306pm- 340       10pm- 375  5/27/24-709am- 127        400pm- 230  5/26/24-725am- 223        11am- 308          5pm- 296    States no new medications, steroids or recent illness.  Has had diarrhea for about 6 weeks.  Is turning in a stool sample to Dr. White, GI.  States he was taking 48 units in the am and 8 units in the evening.  Called in because he was having lows and diarrhea and on call stopped his ozempic and decreased his insulin dose to 44 units the am and 4 units in the evening.

## 2024-06-04 RX ORDER — PEN NEEDLE, DIABETIC 29 G X1/2"
NEEDLE, DISPOSABLE MISCELLANEOUS
Qty: 200 EACH | Refills: 1 | Status: SHIPPED | OUTPATIENT
Start: 2024-06-04

## 2024-06-04 NOTE — TELEPHONE ENCOUNTER
Caller: Walter Herrera    Relationship: Self    Best call back number: [phone]    Requested Prescriptions: nsulin NPH-insulin regular (humuLIN 70/30,novoLIN 70/30) (70-30) 100 UNIT/ML injection   NEEDLES/ 6MM / 31G  Requested Prescriptions      No prescriptions requested or ordered in this encounter        Pharmacy where request should be sent: [facility]- [address]     Last office visit with prescribing clinician: 2/19/2024   Last telemedicine visit with prescribing clinician: Visit date not found   Next office visit with prescribing clinician: 6/25/2024     Additional details provided by patient: PT NEEDS A REFILL ON HIS NOVOLIN AND NEEDLES.     Does the patient have less than a 3 day supply:  [] Yes  [x] No    Would you like a call back once the refill request has been completed: [x] Yes [] No    If the office needs to give you a call back, can they leave a voicemail: [x] Yes [] No    Trinidad Mccabe Rep   06/04/24 10:24 EDT

## 2024-06-14 ENCOUNTER — TELEPHONE (OUTPATIENT)
Dept: ENDOCRINOLOGY | Facility: CLINIC | Age: 74
End: 2024-06-14
Payer: MEDICARE

## 2024-06-14 NOTE — TELEPHONE ENCOUNTER
----- Message from Misael Vidales sent at 6/14/2024 12:41 PM EDT -----  Let's increase the morning insulin from 48 to 50u.  ----- Message -----  From: Darcy Avila  Sent: 6/14/2024  11:10 AM EDT  To: Misael Vidales MD

## 2024-06-25 ENCOUNTER — OFFICE VISIT (OUTPATIENT)
Dept: ENDOCRINOLOGY | Facility: CLINIC | Age: 74
End: 2024-06-25
Payer: MEDICARE

## 2024-06-25 VITALS
OXYGEN SATURATION: 98 % | HEART RATE: 75 BPM | SYSTOLIC BLOOD PRESSURE: 122 MMHG | HEIGHT: 71 IN | WEIGHT: 298 LBS | BODY MASS INDEX: 41.72 KG/M2 | DIASTOLIC BLOOD PRESSURE: 78 MMHG

## 2024-06-25 DIAGNOSIS — E78.2 MIXED HYPERLIPIDEMIA: ICD-10-CM

## 2024-06-25 DIAGNOSIS — E11.65 TYPE 2 DIABETES MELLITUS WITH HYPERGLYCEMIA, WITH LONG-TERM CURRENT USE OF INSULIN: Primary | ICD-10-CM

## 2024-06-25 DIAGNOSIS — I25.10 ATHEROSCLEROSIS OF NATIVE CORONARY ARTERY OF NATIVE HEART WITHOUT ANGINA PECTORIS: ICD-10-CM

## 2024-06-25 DIAGNOSIS — Z79.4 TYPE 2 DIABETES MELLITUS WITH HYPERGLYCEMIA, WITH LONG-TERM CURRENT USE OF INSULIN: Primary | ICD-10-CM

## 2024-06-25 DIAGNOSIS — I10 ESSENTIAL HYPERTENSION: ICD-10-CM

## 2024-06-25 LAB
EXPIRATION DATE: ABNORMAL
EXPIRATION DATE: ABNORMAL
GLUCOSE BLDC GLUCOMTR-MCNC: 244 MG/DL (ref 70–130)
HBA1C MFR BLD: 8.4 % (ref 4.5–5.7)
Lab: ABNORMAL
Lab: ABNORMAL

## 2024-06-25 PROCEDURE — 82947 ASSAY GLUCOSE BLOOD QUANT: CPT | Performed by: INTERNAL MEDICINE

## 2024-06-25 PROCEDURE — 3074F SYST BP LT 130 MM HG: CPT | Performed by: INTERNAL MEDICINE

## 2024-06-25 PROCEDURE — 99214 OFFICE O/P EST MOD 30 MIN: CPT | Performed by: INTERNAL MEDICINE

## 2024-06-25 PROCEDURE — 1159F MED LIST DOCD IN RCRD: CPT | Performed by: INTERNAL MEDICINE

## 2024-06-25 PROCEDURE — 3078F DIAST BP <80 MM HG: CPT | Performed by: INTERNAL MEDICINE

## 2024-06-25 PROCEDURE — 83036 HEMOGLOBIN GLYCOSYLATED A1C: CPT | Performed by: INTERNAL MEDICINE

## 2024-06-25 PROCEDURE — 1160F RVW MEDS BY RX/DR IN RCRD: CPT | Performed by: INTERNAL MEDICINE

## 2024-06-25 PROCEDURE — 3052F HG A1C>EQUAL 8.0%<EQUAL 9.0%: CPT | Performed by: INTERNAL MEDICINE

## 2024-06-25 RX ORDER — ROSUVASTATIN CALCIUM 10 MG/1
10 TABLET, COATED ORAL DAILY
Qty: 90 TABLET | Refills: 3 | Status: SHIPPED | OUTPATIENT
Start: 2024-06-25

## 2024-06-25 NOTE — PROGRESS NOTES
"     Office Note      Date: 2024  Patient Name: Walter Herrera  MRN: [id number]  : 1950    Chief Complaint   Patient presents with    Diabetes     Type II       History of Present Illness:   Walter Herrera is a 73 y.o. male who presents for Diabetes type 2. Diagnosed in: . Treated in past with oral agents. Current treatments: ozempic (on hold due to diarrhea) and premix insulin. Number of insulin shots per day: 2. Checks blood sugar 288 times a day - on FreeStyle [employer]ine[employer] CGM. Has low blood sugar: rare. Aspirin use: Yes. Statin use: Yes. ACE-I/ARB use: Yes. Changes in health since last visit: colon polypectomy. Last eye exam 10/2023.     Subjective      Diabetic Complications:  Eyes: Yes - BDR  Kidneys: No  Feet: Yes - neuropathy  Heart: Yes     Diet and Exercise:  Meals per day: 3  Minutes of exercise per week: 0 mins.    Review of Systems:   Review of Systems   Constitutional: Negative.    Cardiovascular: Negative.    Gastrointestinal:  Positive for diarrhea.   Endocrine: Negative.        The following portions of the patient's history were reviewed and updated as appropriate: allergies, current medications, past family history, past medical history, past social history, past surgical history, and problem list.    Objective     Visit Vitals  /78 (BP Location: Left arm, Patient Position: Sitting, Cuff Size: Adult)   Pulse 75   Ht 180.3 cm (71\")   Wt 135 kg (298 lb)   SpO2 98%   BMI 41.56 kg/m²       Physical Exam:  Physical Exam  Constitutional:       Appearance: Normal appearance.   Neurological:      Mental Status: He is alert.         Labs:    HbA1c  Lab Results   Component Value Date    HGBA1C 8.4 (A) 2024       CMP  Lab Results   Component Value Date    GLUCOSE 183 (H) 10/02/2023    BUN 13 10/02/2023    CREATININE 0.97 10/02/2023    EGFRIFNONA 68 2021    BCR 13.4 10/02/2023    K 4.5 10/02/2023    CO2 26.0 10/02/2023    CALCIUM 9.8 10/02/2023    AST 26 10/02/2023    ALT 40 " 10/02/2023        Lipid Panel  Lab Results   Component Value Date    HDL 34 (L) 10/02/2023    LDL 71 10/02/2023    TRIG 117 10/02/2023        TSH  Lab Results   Component Value Date    TSH 2.970 10/02/2023        Hemoglobin A1C  Lab Results   Component Value Date    HGBA1C 8.4 (A) 06/25/2024        Microalbumin/Creatinine  Lab Results   Component Value Date    MALBCRERATIO  10/02/2023      Comment:      Unable to calculate    MICROALBUR <1.2 10/02/2023           Assessment / Plan      Assessment & Plan:  Diagnoses and all orders for this visit:    1. Type 2 diabetes mellitus with hyperglycemia, with long-term current use of insulin (Primary)  Assessment & Plan:  Diabetes is worsening.  He has been off ozempic for 5 weeks due to diarrhea.  However, the diarrhea hasn't changed.  He is willing to resume ozempic.  Resume ozempic.  Continue insulin.  Continue to send in FSBS for insulin adjustments.  Diabetes will be reassessed in 6 months.    Orders:  -     POC Glucose, Blood  -     POC Glycosylated Hemoglobin (Hb A1C)    2. Essential hypertension  Assessment & Plan:  Hypertension is stable and controlled  Continue current treatment regimen.  Blood pressure will be reassessed in 6 months.      3. Mixed hyperlipidemia  Assessment & Plan:  Continue statin and ezetimibe.  Plan to check lipids next visit.      4. Atherosclerosis of native coronary artery of native heart without angina pectoris  Assessment & Plan:  Continue ASA, statin, ezetimibe, and GLP-1 RA.      Other orders  -     rosuvastatin (CRESTOR) 10 MG tablet; Take 1 tablet by mouth Daily.  Dispense: 90 tablet; Refill: 3      Current Outpatient Medications   Medication Instructions    acetaminophen (TYLENOL) 650 mg, Oral, Every 6 Hours PRN    B Complex Vitamins (B COMPLEX 100 PO) 1 tablet, Oral, 2 Times Daily, as directed    bisoprolol (ZEBeta) 10 MG tablet 1 tablet, Oral, Daily    bumetanide (BUMEX) 2 MG tablet 1 tablet, Daily PRN    Continuous Blood Gluc Sensor  (FreeStyle Destiney 2 Sensor) misc Does not apply    EQ Aspirin Adult Low Dose 81 MG EC tablet Take 1 tablet by mouth once daily    ezetimibe (ZETIA) 10 MG tablet 1 tablet, Oral, Daily    fluticasone (FLONASE) 50 MCG/ACT nasal spray 2 sprays, Nasal, Daily    gabapentin (NEURONTIN) 400 MG capsule TAKE 1 CAPSULE BY MOUTH THREE TIMES DAILY    glucose blood (OneTouch Ultra) test strip Test blood sugar twice daily.  Dx E11.65    insulin NPH-insulin regular (humuLIN 70/30,novoLIN 70/30) (70-30) 100 UNIT/ML injection 48 units am, 8 units pm    Insulin Pen Needle (RELION PEN NEEDLE 31G/8MM) 31G X 8 MM misc Use twice daily.    Lancets (onetouch ultrasoft) lancets Test 2 times daily DX E11.65    losartan (COZAAR) 75 mg, Oral, Daily    minocycline (MINOCIN,DYNACIN) 100 mg, Oral, Daily    nitroglycerin (NITROSTAT) 0.4 MG SL tablet 1 under the tongue as needed for angina, may repeat q5mins for up three doses    Ozempic (0.25 or 0.5 MG/DOSE) 0.5 mg, Subcutaneous, Weekly    potassium chloride (K-DUR,KLOR-CON) 20 MEQ CR tablet 20 mEq, Oral, Daily PRN    probenecid (BENEMID) 500 mg, Oral, 2 Times Daily    rosuvastatin (CRESTOR) 10 mg, Oral, Daily      Return in about 6 months (around 12/25/2024) for Recheck with A1c, CMP, lipid, TSH, microalbumin, foot exam.    Electronically signed by: Misael Vidales MD  06/25/2024

## 2024-06-25 NOTE — ASSESSMENT & PLAN NOTE
Diabetes is worsening.  He has been off ozempic for 5 weeks due to diarrhea.  However, the diarrhea hasn't changed.  He is willing to resume ozempic.  Resume ozempic.  Continue insulin.  Continue to send in FSBS for insulin adjustments.  Diabetes will be reassessed in 6 months.

## 2024-07-17 NOTE — TELEPHONE ENCOUNTER
Patient notified.  He is currently taking 1mg.  Okay per Dr. Vidales to increase to 2mg.  Rx sent per protocol.

## 2024-07-17 NOTE — TELEPHONE ENCOUNTER
Spoke with patient, patient verbalized understanding of labs.     Patient states he is on his last box of Ozempic and would like to go up to the next dose. Is that okay with you?

## 2024-07-31 ENCOUNTER — TELEPHONE (OUTPATIENT)
Dept: ENDOCRINOLOGY | Facility: CLINIC | Age: 74
End: 2024-07-31
Payer: MEDICARE

## 2024-07-31 NOTE — TELEPHONE ENCOUNTER
----- Message from Misael Vidales sent at 7/30/2024 12:49 PM EDT -----  Blood glucose levels are variable.  No patterns for insulin adjustment at this time.  ----- Message -----  From: Nadiya Avila  Sent: 7/30/2024  11:42 AM EDT  To: Misael Vidales MD  
Spoke to pt-he voiced understanding  
[id number] [facility]

## 2024-08-06 ENCOUNTER — TELEPHONE (OUTPATIENT)
Dept: ENDOCRINOLOGY | Facility: CLINIC | Age: 74
End: 2024-08-06
Payer: MEDICARE

## 2024-08-06 NOTE — TELEPHONE ENCOUNTER
Spoke with patient.  Patient understands no changes are needed in insulin regimen.  Patient wanted to let Dr AMBROSE Telles know that he is down to 284lbs.            ---- Message from Gabo Telles sent at 8/6/2024 11:05 AM EDT -----  These look ok. I don't see a need to change anything  ----- Message -----  From: Moris Sheets PCT  Sent: 8/6/2024  10:44 AM EDT  To: Misael Vidales MD

## 2024-08-16 NOTE — TELEPHONE ENCOUNTER
----- Message from Misael Vidales MD sent at 12/2/2021 10:41 AM EST -----      ----- Message -----  From: Nadiya Avila  Sent: 12/2/2021   9:26 AM EST  To: Misael Vidales MD     Thank you for your confidence in our team.   We appreciate you and welcome your feedback.   If you receive a survey from us, please take a few moments to let us know how we are doing.   Sincerely,  TRIP Easton

## 2024-08-29 ENCOUNTER — TELEPHONE (OUTPATIENT)
Dept: ENDOCRINOLOGY | Facility: CLINIC | Age: 74
End: 2024-08-29
Payer: MEDICARE

## 2024-08-29 NOTE — TELEPHONE ENCOUNTER
Spoke with patient and he voiced understanding. Patient also inquired about increasing his Ozempic to 2 mg. He has been on the 1 mg dose for >4 weeks and tolerating well. Pt feels like the 1 mg is not working anymore.

## 2024-09-06 ENCOUNTER — TELEPHONE (OUTPATIENT)
Dept: ENDOCRINOLOGY | Facility: CLINIC | Age: 74
End: 2024-09-06
Payer: MEDICARE

## 2024-09-06 NOTE — TELEPHONE ENCOUNTER
Left message to return call.  Dr Vidales reviewed bs's - increase morning insulin to 54 units.  Continue 14 units at supper

## 2024-09-16 ENCOUNTER — TELEPHONE (OUTPATIENT)
Dept: ENDOCRINOLOGY | Facility: CLINIC | Age: 74
End: 2024-09-16
Payer: MEDICARE

## 2024-10-01 ENCOUNTER — HOSPITAL ENCOUNTER (EMERGENCY)
Facility: HOSPITAL | Age: 74
Discharge: HOME | End: 2024-10-01
Attending: EMERGENCY MEDICINE
Payer: MEDICARE

## 2024-10-01 ENCOUNTER — APPOINTMENT (OUTPATIENT)
Facility: HOSPITAL | Age: 74
End: 2024-10-01
Payer: MEDICARE

## 2024-10-01 ENCOUNTER — TELEPHONE (OUTPATIENT)
Dept: ENDOCRINOLOGY | Facility: CLINIC | Age: 74
End: 2024-10-01
Payer: MEDICARE

## 2024-10-01 VITALS
HEIGHT: 71 IN | SYSTOLIC BLOOD PRESSURE: 140 MMHG | TEMPERATURE: 97.9 F | RESPIRATION RATE: 18 BRPM | HEART RATE: 74 BPM | OXYGEN SATURATION: 95 % | DIASTOLIC BLOOD PRESSURE: 71 MMHG | BODY MASS INDEX: 42.28 KG/M2 | WEIGHT: 302 LBS

## 2024-10-01 DIAGNOSIS — M54.42 CHRONIC BILATERAL LOW BACK PAIN WITH BILATERAL SCIATICA: Primary | ICD-10-CM

## 2024-10-01 DIAGNOSIS — M54.41 CHRONIC BILATERAL LOW BACK PAIN WITH BILATERAL SCIATICA: Primary | ICD-10-CM

## 2024-10-01 DIAGNOSIS — G89.29 CHRONIC BILATERAL LOW BACK PAIN WITH BILATERAL SCIATICA: Primary | ICD-10-CM

## 2024-10-01 PROCEDURE — 99284 EMERGENCY DEPT VISIT MOD MDM: CPT

## 2024-10-01 PROCEDURE — 72131 CT LUMBAR SPINE W/O DYE: CPT

## 2024-10-01 PROCEDURE — 25010000002 DEXAMETHASONE PER 1 MG

## 2024-10-01 PROCEDURE — 96372 THER/PROPH/DIAG INJ SC/IM: CPT

## 2024-10-01 PROCEDURE — 25010000002 KETOROLAC TROMETHAMINE PER 15 MG

## 2024-10-01 RX ORDER — TRAMADOL HYDROCHLORIDE 50 MG/1
50 TABLET ORAL ONCE
Status: COMPLETED | OUTPATIENT
Start: 2024-10-01 | End: 2024-10-01

## 2024-10-01 RX ORDER — LIDOCAINE 4 G/G
1 PATCH TOPICAL
Status: DISCONTINUED | OUTPATIENT
Start: 2024-10-01 | End: 2024-10-01 | Stop reason: HOSPADM

## 2024-10-01 RX ORDER — KETOROLAC TROMETHAMINE 15 MG/ML
15 INJECTION, SOLUTION INTRAMUSCULAR; INTRAVENOUS ONCE
Status: COMPLETED | OUTPATIENT
Start: 2024-10-01 | End: 2024-10-01

## 2024-10-01 RX ORDER — LIDOCAINE 50 MG/G
1 PATCH TOPICAL EVERY 24 HOURS
Qty: 14 EACH | Refills: 0 | Status: SHIPPED | OUTPATIENT
Start: 2024-10-01

## 2024-10-01 RX ORDER — CYCLOBENZAPRINE HCL 10 MG
5 TABLET ORAL ONCE
Status: COMPLETED | OUTPATIENT
Start: 2024-10-01 | End: 2024-10-01

## 2024-10-01 RX ORDER — CYCLOBENZAPRINE HCL 5 MG
5 TABLET ORAL 3 TIMES DAILY PRN
Qty: 15 TABLET | Refills: 0 | Status: SHIPPED | OUTPATIENT
Start: 2024-10-01

## 2024-10-01 RX ORDER — DEXAMETHASONE SODIUM PHOSPHATE 10 MG/ML
10 INJECTION INTRAMUSCULAR; INTRAVENOUS ONCE
Status: COMPLETED | OUTPATIENT
Start: 2024-10-01 | End: 2024-10-01

## 2024-10-01 RX ADMIN — TRAMADOL HYDROCHLORIDE 50 MG: 50 TABLET ORAL at 13:48

## 2024-10-01 RX ADMIN — KETOROLAC TROMETHAMINE 15 MG: 15 INJECTION, SOLUTION INTRAMUSCULAR; INTRAVENOUS at 14:51

## 2024-10-01 RX ADMIN — LIDOCAINE 1 PATCH: 4 PATCH TOPICAL at 13:52

## 2024-10-01 RX ADMIN — DEXAMETHASONE SODIUM PHOSPHATE 10 MG: 10 INJECTION INTRAMUSCULAR; INTRAVENOUS at 14:52

## 2024-10-01 RX ADMIN — CYCLOBENZAPRINE 5 MG: 10 TABLET, FILM COATED ORAL at 13:48

## 2024-10-01 NOTE — FSED PROVIDER NOTE
"CHIEF COMPLAINT  Back Pain     HISTORY OF PRESENT ILLNESS:  Walter Herrera is a 73 y.o. male who presents with acute on chronic low back pain.  Patient states that over the past 10 days his back has been hurting more than usual and he is having his \"sciatica\" flares.  Originally on his right lower extremity but started on his left lower extremity today.  He states that Tylenol is improving his pain.  He reports a history of coronary artery disease with 10 coronary stents, atrial fibrillation.  No anticoagulation per chart review other than baby aspirin.  Denies trauma, saddle anesthesia, loss of bowel bladder control, urinary retention, weakness of the lower extremities.  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-Abstracted from [place]    Gout     Hypoglycemia due to type 2 diabetes mellitus     Long term current use of insulin     Measles     Mixed hyperlipidemia     Near syncope     Obesity     Sleep apnea     Stroke     Subacute thyroiditis     Type 2 diabetes mellitus, uncontrolled     Wears dentures     Wears glasses     Wears hearing aid in both ears     sometimes     Reviewed the imported nursing notes    PAST SURGICAL HISTORY  Past Surgical History:   Procedure Laterality Date    ATRIAL APPENDAGE EXCLUSION LEFT WITH TRANSESOPHAGEAL ECHOCARDIOGRAM N/A 09/27/2021    Procedure: Atrial Appendage Occlusion (9/27/21) on Warfarin DNS;  Surgeon: Derek Vergara MD;  Location: [facility] INVASIVE LOCATION;  Service: Cardiology;  Laterality: N/A;    BASAL CELL CARCINOMA EXCISION      CARDIAC CATHETERIZATION      [facility] 2003    CATARACT EXTRACTION Bilateral     CHOLECYSTECTOMY      COLONOSCOPY  2019    COLONOSCOPY      9 polyps removed    CORONARY ARTERY BYPASS GRAFT       [facility] [place] 2015    CORONARY STENT PLACEMENT      [place] [facility] 2003    HERNIA REPAIR      x2    INGUINAL HERNIA REPAIR      PACEMAKER IMPLANTATION      SHOULDER ROTATOR CUFF REPAIR      x2     Reviewed the imported nursing notes    ALLERGIES  Allergies   Allergen Reactions    Erythromycin Diarrhea    Morphine Unknown - Low Severity     Blisters    Penicillins Rash       MEDICATIONS       Medication List        START taking these medications      cyclobenzaprine 5 MG tablet  Commonly known as: FLEXERIL  Take 1 tablet by mouth 3 (Three) Times a Day As Needed for Muscle Spasms.     lidocaine 5 %  Commonly known as: Lidoderm  Place 1 patch on the skin as directed by provider Daily. Remove & Discard patch within 12 hours or as directed by MD            CONTINUE taking these medications      acetaminophen 650 MG/20.3ML solution solution  Commonly known as: TYLENOL     B COMPLEX 100 PO     bisoprolol 10 MG tablet  Commonly known as: ZEBeta     bumetanide 2 MG tablet  Commonly known as: BUMEX     EQ Aspirin Adult Low Dose 81 MG EC tablet  Generic drug: aspirin  Take 1 tablet by mouth once daily     ezetimibe 10 MG tablet  Commonly known as: ZETIA     fluticasone 50 MCG/ACT nasal spray  Commonly known as: FLONASE  2 sprays into the nostril(s) as directed by provider Daily.     FreeStyle Destiney 2 Sensor misc     gabapentin 400 MG capsule  Commonly known as: NEURONTIN  TAKE 1 CAPSULE BY MOUTH THREE TIMES DAILY     insulin NPH-insulin regular (70-30) 100 UNIT/ML injection  Commonly known as: humuLIN 70/30,novoLIN 70/30  48 units am, 8 units pm     losartan 50 MG tablet  Commonly known as: COZAAR  TAKE 1 & 1/2 (ONE & ONE-HALF) TABLETS BY MOUTH ONCE DAILY     minocycline 100 MG capsule  Commonly known as: MINOCIN,DYNACIN      nitroglycerin 0.4 MG SL tablet  Commonly known as: NITROSTAT  1 under the tongue as needed for angina, may repeat q5mins for up three doses     OneTouch Ultra test strip  Generic drug: glucose blood  Test blood sugar twice daily.  Dx E11.65     onetouch ultrasoft lancets  Test 2 times daily DX E11.65     potassium chloride 20 MEQ CR tablet  Commonly known as: KLOR-CON M20  Take 1 tablet by mouth Daily As Needed (with bumex).     probenecid 500 MG tablet  Commonly known as: BENEMID  Take 1 tablet by mouth 2 (Two) Times a Day.     RELION PEN NEEDLE 31G/8MM 31G X 8 MM misc  Generic drug: Insulin Pen Needle  Use twice daily.     rosuvastatin 10 MG tablet  Commonly known as: CRESTOR  Take 1 tablet by mouth Daily.     Semaglutide (1 MG/DOSE) 4 MG/3ML solution pen-injector  Commonly known as: OZEMPIC  Inject 1 mg under the skin into the appropriate area as directed 1 (One) Time Per Week.               Where to Get Your Medications        These medications were sent to [facility] [address] [place] [address] [facility] [phone] [place] [phone] [address] [address]      Phone: [phone]   cyclobenzaprine 5 MG tablet  lidocaine 5 %       SOCIAL HISTORY    Social History     Tobacco Use    Smoking status: Former     Types: Pipe     Quit date: 1972     Years since quittin.0    Smokeless tobacco: Never   Vaping Use    Vaping status: Never Used   Substance Use Topics    Alcohol use: Not Currently     Alcohol/week: 1.0 standard drink of alcohol     Types: 1 Cans of beer per week    Drug use: Never     The patient otherwise denies any further social history.  Reviewed the imported nursing notes      FAMILY HISTORY  Family History   Problem Relation Age of Onset    Heart attack Mother     Heart disease Mother     Dementia Mother     Hepatitis Father     Heart disease Sister     Heart disease Brother     Heart disease Brother      The patient otherwise patient denies any further family  "history.  Reviewed the imported nursing notes      REVIEW OF SYSTEMS  Constitutional: No wt loss or night sweats  HEENT: No stridor or difficulty swallowing  Neck: No stiff neck or mass  Eyes: No visual loss, no discharge  Respiratory: no cough, no dyspnea.  Cardiovascular: No syncope, no palpitations.  Gastrointestinal: No vomiting no abd pain.  Genitourinary: No dysuria, no hematuria.  Musculoskeletal: See HPI  Skin: no rash  Neurological: No numbness, no weakness.  Hematological: No petechiae, no spontaneous bruising.  Psychiatric/Behavioral: No hallucinations no delusions.        PHYSICAL EXAM  Vitals: /71   Pulse 74   Temp 97.9 °F (36.6 °C) (Oral)   Resp 18   Ht 180.3 cm (71\")   Wt (!) 137 kg (302 lb)   SpO2 95%   BMI 42.12 kg/m²      General Appearance: Awake and Alert, cooperative, no distress   Head:  Normocephalic, atraumatic   Eyes: Conjunctiva clear, corneas clear   Ears:  Normal external ears,   Nose: Nares normal and clear   Throat: Lips, mucosa moist   Neck:  Trachea midline, no stridor   Lungs:  Clear to auscultation bilaterally, respirations unlabored   Heart: Regular, No rub   Abdomen: Nondistended, non tender   Genitourinary:  Pelvic:  Rectal: Not Performed  Not Performed  Not Performed   Extremities: Extremities Atraumatic, full range of motion, strength is 5 out of 5 to the bilateral lower extremities, neurovascular intact to the distal lower extremities, negative straight leg raise bilaterally   Vascular: Present in all extremities   Skin:  Skin no rashes or lesions   Neurologic: Non Focal , CN 2-12 grossly intact, GCS 15   Psyc: Not Performed         MEDICAL DECISION MAKING - ED COURSE/PROCEDURE/DDX:    PULSE OX: Interpretation by me on room air Is normal   SpO2 Readings from Last 1 Encounters:   10/01/24 95%          CARDIAC MONITOR: Normal sinus rhythm  Pulse Readings from Last 1 Encounters:   10/01/24 74            I reviewed the nursing notes: YES  I reviewed and discussed with " "EMS:   External documents reviewed:   Additional history taken from: Previous notes     Discussed with consulting physician  additionally, the admitting / accepting provider was contacted with handoff      LAB REVIEWED: Interpretation of all unique test ordered  Labs Reviewed - No data to display    IMAGING REVIEWED  My X-ray interpretation:   My CT interpretation: No acute fracture of the lumbar spine  My Ultrasound interpretation:   CT Lumbar Spine Without Contrast   Final Result   Impression:   Disc disease as detailed. Mild spinal stenosis L1-2, L2-3, and L5-S1. Moderately severe spinal stenosis L3-4 and severe spinal stenosis L4-5. Mild bilateral neural foraminal impingement L3-4 and L4-5 and moderately severe bilateral neural foraminal    impingement L5-S1.            Electronically Signed: Annabel Blue MD     10/1/2024 2:23 PM EDT     Workstation ID: [id number]          Procedures:    Decision rules/scores:        Drug therapy provided in the ED and monitored as needed given IV/PO :   Medications   Lidocaine 4 % 1 patch (1 patch Transdermal Medication Applied 10/1/24 1352)   dexAMETHasone (DECADRON) injection 10 mg (has no administration in time range)   ketorolac (TORADOL) injection 15 mg (has no administration in time range)   cyclobenzaprine (FLEXERIL) tablet 5 mg (5 mg Oral Given 10/1/24 1348)   traMADol (ULTRAM) tablet 50 mg (50 mg Oral Given 10/1/24 1348)       Vitals Trend:  Vitals:    10/01/24 1322 10/01/24 1423 10/01/24 1430   BP: (!) 185/114 159/93 140/71   BP Location: Left arm     Patient Position: Sitting     Pulse: 78 83 74   Resp: 18     Temp: 97.9 °F (36.6 °C)     TempSrc: Oral     SpO2: 95% 95% 95%   Weight: (!) 137 kg (302 lb)     Height: 180.3 cm (71\")         Walter Herrera is a 73 y.o. male who presents to the emergency department with the acute illness as stated within HPI  Shared medical decision making regarding a detailed discussion with the patient concerning this ED encounter, " the differential diagnosis considered cauda equina, conus medullaris, fracture, sprain, strain, dislocation, inflammatory arthropathy, sciatica, nerve impingement, epidural abscess, and the emergency room imaging and lab testing done today The patient and/or family have been given an opportunity to ask questions and exhibit an understanding of what happened today in the emergency room.        ED Course as of 10/01/24 1445   Tue Oct 01, 2024   1430 CT Lumbar Spine Without Contrast [NC]   1440 Patient CT scan does showed severe degenerative changes.  She states that he has been aware of these for many years and he follows up with Dr. Chowdhury with orthopedic surgery.  Was previously told that he needs spinal ablation.  Today the patient has full strength of his bilateral lower extremities he is ambulatory, has no red flags for a cauda equina syndrome or other neurovascular catastrophe at this time.  Will have him continue conservative therapy with Tylenol,muscle relaxers, lidocaine patches, and follow-up with spine specialty.  He is requesting a referral to [facility].  Advised mother of flags return to the ER otherwise follow-up with the specialists [NC]      ED Course User Index  [NC] Grayson Mendoza PA-C           Condition considered emergent due to:  1) Acuity  2) Failure or unavailable treatment in the outpatient setting  3) Risk of deterioration and decompensation given the multiple differential diagnoses that  need to be ruled out      Amount and/or Complexity of Data Reviewed  Clinical lab tests: as above noted in MDM  Tests in the radiology section of CPT®: as above noted in MDM  Tests in the medicine section of CPT®: as above noted in MDM  Independent visualization of images, tracings, or specimens: as above noted in MDM        CLINICAL IMPRESSION:  Final diagnoses:   Chronic bilateral low back pain with bilateral sciatica      DISPOSITION:  discharge      As with my usual  standard practice patient was advised to return for any reason for any  complaint at any time whatsoever. Please refer to the discharge instructions, AVS paperwork  and prescriptions written for treatment plan.      Grayson Mendoza PA-C   Reviewed and Electronically signed  10/1/2024  14:45 EDT      This report was dictated utilizing a voice recognition system which has a propensity to miss  small words such as a, an, the, no, he,she,him, her,his and not. Please read this report carefully,  and if any discrepancy or uncertainty is present, please contact the author directly for  clarification

## 2024-10-01 NOTE — TELEPHONE ENCOUNTER
Pt stopped by to drop off blood sugar reading I have placed in Dr Vidales folder upfront.   FYI pt wanted to let Brenna know he headed to [facility] spine disc lower back is deteriorating. Pt thinks this could be causing his blood sugar to be high

## 2024-10-03 ENCOUNTER — TELEPHONE (OUTPATIENT)
Dept: ENDOCRINOLOGY | Facility: CLINIC | Age: 74
End: 2024-10-03
Payer: MEDICARE

## 2024-10-03 NOTE — TELEPHONE ENCOUNTER
Spoke to pt per dr sotelo:  he reviewed bs's increase before breakfast insulin to 56 units   he voiced understanding

## 2024-10-10 ENCOUNTER — DOCUMENTATION (OUTPATIENT)
Dept: NEUROSURGERY | Facility: CLINIC | Age: 74
End: 2024-10-10
Payer: MEDICARE

## 2024-10-10 NOTE — PROGRESS NOTES
Pt called requesting a refill on Lidocaine patches and Flexeril. Pt aware that we cannot refill any medication until he is officially evaluated in the office. He was seen in the Emergency Room at [place].     Pt aware and verbalized understanding. He will contact his PCP.

## 2024-10-14 DIAGNOSIS — E11.49 TYPE 2 DIABETES MELLITUS WITH NEUROLOGICAL MANIFESTATIONS: ICD-10-CM

## 2024-10-14 RX ORDER — GABAPENTIN 400 MG/1
400 CAPSULE ORAL 3 TIMES DAILY
Qty: 270 CAPSULE | Refills: 1 | OUTPATIENT
Start: 2024-10-14

## 2024-10-14 NOTE — TELEPHONE ENCOUNTER
Rx Refill Note  Requested Prescriptions     Pending Prescriptions Disp Refills    gabapentin (NEURONTIN) 400 MG capsule [Pharmacy Med Name: Gabapentin 400 MG Oral Capsule] 90 capsule 3     Sig: TAKE 1 CAPSULE BY MOUTH THREE TIMES DAILY      Last office visit with prescribing clinician: 6/25/2024   Last telemedicine visit with prescribing clinician: Visit date not found   Next office visit with prescribing clinician: 10/14/2024                         Would you like a call back once the refill request has been completed: [] Yes [] No    If the office needs to give you a call back, can they leave a voicemail: [] Yes [] No    Azul Funez MA  10/14/24, 14:40 EDT

## 2024-10-14 NOTE — TELEPHONE ENCOUNTER
Caller: Walter Herrera Matthieu    Relationship: Self    Best call back number: [phone]    Requested Prescriptions:   Requested Prescriptions     Pending Prescriptions Disp Refills    gabapentin (NEURONTIN) 400 MG capsule 270 capsule 1     Sig: Take 1 capsule by mouth 3 (Three) Times a Day.        Pharmacy where request should be sent:  [facility]    Last office visit with prescribing clinician: 6/25/2024   Last telemedicine visit with prescribing clinician: Visit date not found   Next office visit with prescribing clinician: 1/21/2025     Additional details provided by patient:     Does the patient have less than a 3 day supply:  [x] Yes  [] No    Would you like a call back once the refill request has been completed: [x] Yes [] No    If the office needs to give you a call back, can they leave a voicemail: [x] Yes [] No    Trinidad Huitron Rep   10/14/24 10:42 EDT

## 2024-10-15 RX ORDER — GABAPENTIN 400 MG/1
400 CAPSULE ORAL 3 TIMES DAILY
Qty: 90 CAPSULE | Refills: 3 | OUTPATIENT
Start: 2024-10-15

## 2024-10-17 DIAGNOSIS — E11.49 TYPE 2 DIABETES MELLITUS WITH NEUROLOGICAL MANIFESTATIONS: ICD-10-CM

## 2024-10-17 RX ORDER — GABAPENTIN 400 MG/1
400 CAPSULE ORAL 3 TIMES DAILY
Qty: 270 CAPSULE | Refills: 1 | Status: CANCELLED | OUTPATIENT
Start: 2024-10-17

## 2024-10-17 RX ORDER — GABAPENTIN 400 MG/1
400 CAPSULE ORAL 3 TIMES DAILY
Qty: 90 CAPSULE | Refills: 0 | OUTPATIENT
Start: 2024-10-17

## 2024-10-17 NOTE — TELEPHONE ENCOUNTER
PT CALLED REQUESTING RX FOR GABAPENTIN TO BE SENT IN TO [facility] IN [place], [place]. PT IS OUT OF MEDS. PT REQUESTED A CALL BACK TO CONSULT.

## 2024-10-19 DIAGNOSIS — E11.49 TYPE 2 DIABETES MELLITUS WITH NEUROLOGICAL MANIFESTATIONS: ICD-10-CM

## 2024-10-19 RX ORDER — GABAPENTIN 400 MG/1
400 CAPSULE ORAL 3 TIMES DAILY
Qty: 270 CAPSULE | Refills: 1 | Status: SHIPPED | OUTPATIENT
Start: 2024-10-19

## 2024-10-21 ENCOUNTER — TELEPHONE (OUTPATIENT)
Age: 74
End: 2024-10-21
Payer: MEDICARE

## 2024-10-21 NOTE — TELEPHONE ENCOUNTER
Was paged by patient over the weekend about refill for gabapentin.   I reviewed the chart. It had been pended but not sent on 10/17. I sent the presriptiong

## 2024-10-22 ENCOUNTER — TELEPHONE (OUTPATIENT)
Dept: ENDOCRINOLOGY | Facility: CLINIC | Age: 74
End: 2024-10-22
Payer: MEDICARE

## 2024-10-22 NOTE — TELEPHONE ENCOUNTER
----- Message from Misael Vidales sent at 10/22/2024 12:50 PM EDT -----  Glucose levels are okay.  No change in insulin at this time.  ----- Message -----  From: Nadiya Avila  Sent: 10/22/2024  11:37 AM EDT  To: Misael Vidales MD

## 2024-10-30 ENCOUNTER — TELEPHONE (OUTPATIENT)
Dept: ENDOCRINOLOGY | Facility: CLINIC | Age: 74
End: 2024-10-30
Payer: MEDICARE

## 2024-10-30 RX ORDER — BLOOD-GLUCOSE SENSOR
EACH MISCELLANEOUS SEE ADMIN INSTRUCTIONS
Qty: 6 EACH | Refills: 3 | Status: SHIPPED | OUTPATIENT
Start: 2024-10-30

## 2024-10-30 NOTE — TELEPHONE ENCOUNTER
Pharmacy Name:  Company      Pharmacy representative name: JAYLON    Pharmacy representative phone number: [phone] OPTION 2  What medication are you calling in regards to: NEEDS ORDER FOR FREE STYLE STUART 3 PLUS.   FREE STYLE STUART 3 HAS BEEN DISCONTINUED   ORDER CAN BE FAXED TO  [phone]

## 2024-11-01 DIAGNOSIS — E11.49 TYPE 2 DIABETES MELLITUS WITH NEUROLOGICAL MANIFESTATIONS: ICD-10-CM

## 2024-11-01 RX ORDER — GABAPENTIN 400 MG/1
400 CAPSULE ORAL 3 TIMES DAILY
Qty: 270 CAPSULE | Refills: 1 | OUTPATIENT
Start: 2024-11-01

## 2024-11-06 ENCOUNTER — TELEPHONE (OUTPATIENT)
Dept: ENDOCRINOLOGY | Facility: CLINIC | Age: 74
End: 2024-11-06
Payer: MEDICARE

## 2024-11-07 ENCOUNTER — TELEPHONE (OUTPATIENT)
Dept: ENDOCRINOLOGY | Facility: CLINIC | Age: 74
End: 2024-11-07
Payer: MEDICARE

## 2024-11-07 NOTE — TELEPHONE ENCOUNTER
Hub staff attempted to follow warm transfer process and was unsuccessful     Caller: Walter Herrera    Relationship to patient: Self    Best call back number: [phone]    Patient is needing: PT RETURNING MISSED CALL FROM [facility]. PLEASE CALL BACK.

## 2024-11-07 NOTE — TELEPHONE ENCOUNTER
Spoke with patient. Patient states that he had an Afib attack Tuesday 11/05/24.  Heart rate went to 150bpm.  Patient was hospitalized over night.  Possibly looking at ablation.  He is concerned that glucose levels will be elevated.  He is staying off Ozempic until next week.  Patient understands glucose log review and not to change any dosages.  Dr Claudio is following patient closely.  Dr Claudio is also referring patient to [facility].  Patient wanted to relay info to Dr Vidales.  Patient states that he will keep an eye on his glucose levels.Patient states that he is exhausted and is just not feeling well at this time.  Glucose was 220 this am and he will continue to monitor.  He had not eaten in a couple days and enjoyed a hamburger last night. He wanted Dr Vidales to know that he greatly appreciated him.

## 2024-12-05 ENCOUNTER — TELEPHONE (OUTPATIENT)
Dept: ENDOCRINOLOGY | Facility: CLINIC | Age: 74
End: 2024-12-05
Payer: MEDICARE

## 2024-12-05 NOTE — TELEPHONE ENCOUNTER
Spoke with patient regarding glucose log.  Patient is aware that no changes are required in insulin dosage per Dr Vidales.

## 2024-12-09 ENCOUNTER — OFFICE VISIT (OUTPATIENT)
Dept: NEUROSURGERY | Facility: CLINIC | Age: 74
End: 2024-12-09
Payer: MEDICARE

## 2024-12-09 VITALS
HEIGHT: 71 IN | DIASTOLIC BLOOD PRESSURE: 100 MMHG | WEIGHT: 307 LBS | SYSTOLIC BLOOD PRESSURE: 149 MMHG | BODY MASS INDEX: 42.98 KG/M2 | TEMPERATURE: 98 F

## 2024-12-09 DIAGNOSIS — M48.062 SPINAL STENOSIS, LUMBAR REGION, WITH NEUROGENIC CLAUDICATION: Primary | ICD-10-CM

## 2024-12-09 PROCEDURE — 3077F SYST BP >= 140 MM HG: CPT | Performed by: PHYSICIAN ASSISTANT

## 2024-12-09 PROCEDURE — 3080F DIAST BP >= 90 MM HG: CPT | Performed by: PHYSICIAN ASSISTANT

## 2024-12-09 PROCEDURE — 99204 OFFICE O/P NEW MOD 45 MIN: CPT | Performed by: PHYSICIAN ASSISTANT

## 2024-12-09 RX ORDER — MELOXICAM 7.5 MG/1
7.5 TABLET ORAL
COMMUNITY
Start: 2024-12-04

## 2024-12-09 RX ORDER — TRAMADOL HYDROCHLORIDE 50 MG/1
50 TABLET ORAL
COMMUNITY
Start: 2024-12-04

## 2024-12-09 RX ORDER — POTASSIUM CHLORIDE 750 MG/1
10 TABLET, EXTENDED RELEASE ORAL
COMMUNITY
Start: 2024-09-05

## 2024-12-09 RX ORDER — DOXYCYCLINE 100 MG/1
1 CAPSULE ORAL 2 TIMES DAILY
COMMUNITY

## 2024-12-09 RX ORDER — INSULIN ASPART 100 [IU]/ML
INJECTION, SUSPENSION SUBCUTANEOUS
COMMUNITY

## 2024-12-09 RX ORDER — ALLOPURINOL 100 MG/1
1 TABLET ORAL DAILY
COMMUNITY

## 2024-12-09 RX ORDER — BENZONATATE 100 MG/1
100 CAPSULE ORAL
COMMUNITY

## 2024-12-09 RX ORDER — SILDENAFIL 100 MG/1
1 TABLET, FILM COATED ORAL DAILY
COMMUNITY

## 2024-12-09 NOTE — PROGRESS NOTES
Patient: Walter Herrera  : 1950    Primary Care Provider: La Nena Ramirez MD      Chief Complaint: Low back pain right hip and leg pain    History of Present Illness:       Patient is a 73-year-old  who unfortunately was injured on 1/3/2023 at [employer] when he slipped and fell on water.  Patient is currently under litigation for this.    Patient states since that time has had low back pain as well as right leg pain that is limiting his activities of daily living.  Patient is here to discuss his CT scan of his lumbar spine.    Patient is on Ozempic and has a A1c of 7.8.  Patient is lost a significant amount of weight on Ozempic but is still with a BMI of 42.82.     Patient is gone through physical therapy but has yet to see pain management.     Patient states that at rest his pain is manageable but when he gets up to walk he has quite a bit of low back pain and pain that runs into the right leg.    Patient also has some other auxiliary complaints of diarrhea.     Review of Systems   Constitutional:  Negative for activity change, appetite change, chills, diaphoresis, fatigue, fever and unexpected weight change.   HENT:  Negative for congestion, dental problem, drooling, ear discharge, ear pain, facial swelling, hearing loss, mouth sores, nosebleeds, postnasal drip, rhinorrhea, sinus pressure, sinus pain, sneezing, sore throat, tinnitus, trouble swallowing and voice change.    Eyes:  Negative for photophobia, pain, discharge, redness, itching and visual disturbance.   Respiratory:  Negative for apnea, cough, choking, chest tightness, shortness of breath, wheezing and stridor.    Cardiovascular:  Negative for chest pain, palpitations and leg swelling.   Gastrointestinal:  Negative for abdominal distention, abdominal pain, anal bleeding, blood in stool, constipation, diarrhea, nausea, rectal pain and vomiting.   Endocrine: Negative for cold intolerance, heat intolerance, polydipsia, polyphagia  and polyuria.   Genitourinary:  Negative for decreased urine volume, difficulty urinating, dysuria, enuresis, flank pain, frequency, genital sores, hematuria, penile discharge, penile pain, penile swelling, scrotal swelling, testicular pain and urgency.   Musculoskeletal:  Positive for back pain and gait problem. Negative for arthralgias, joint swelling, myalgias, neck pain and neck stiffness.   Skin:  Negative for color change, pallor, rash and wound.   Allergic/Immunologic: Negative for environmental allergies, food allergies and immunocompromised state.   Neurological:  Negative for dizziness, tremors, seizures, syncope, facial asymmetry, speech difficulty, weakness, light-headedness, numbness and headaches.   Hematological:  Negative for adenopathy. Does not bruise/bleed easily.   Psychiatric/Behavioral:  Negative for agitation, behavioral problems, confusion, decreased concentration, dysphoric mood, hallucinations, self-injury, sleep disturbance and suicidal ideas. The patient is not nervous/anxious and is not hyperactive.        Past Medical History:     Past Medical History:   Diagnosis Date    Abnormal ECG     Arrhythmia     Arthritis     Atrial fibrillation     Benign essential hypertension     BiPAP (biphasic positive airway pressure) dependence     Blood clot in vein     Coronary artery disease     Coronary atherosclerosis     Depression     Diabetes mellitus     Disorder of nervous system     due to type 2 diabetes mellitus-Abstracted from [place]    Gout     Hypoglycemia due to type 2 diabetes mellitus     Long term current use of insulin     Measles     Mixed hyperlipidemia     Near syncope     Obesity     Sleep apnea     Stroke     Subacute thyroiditis     Type 2 diabetes mellitus, uncontrolled     Wears dentures     Wears glasses     Wears hearing aid in both ears     sometimes       Family History:     Family History   Problem Relation Age of Onset    Heart attack Mother     Heart disease Mother      "Dementia Mother     Hepatitis Father     Heart disease Sister     Heart disease Brother     Heart disease Brother        Social History:    reports that he quit smoking about 52 years ago. His smoking use included pipe. He has been exposed to tobacco smoke. He has never used smokeless tobacco. He reports that he does not currently use alcohol after a past usage of about 1.0 standard drink of alcohol per week. He reports that he does not use drugs.   SMOKING STATUS: Non-smoker    Surgical History:     Past Surgical History:   Procedure Laterality Date    ATRIAL APPENDAGE EXCLUSION LEFT WITH TRANSESOPHAGEAL ECHOCARDIOGRAM N/A 09/27/2021    Procedure: Atrial Appendage Occlusion (9/27/21) on Warfarin DNS;  Surgeon: Derek Vergara MD;  Location: [facility] INVASIVE LOCATION;  Service: Cardiology;  Laterality: N/A;    BASAL CELL CARCINOMA EXCISION      CARDIAC CATHETERIZATION      [facility] 2003    CATARACT EXTRACTION Bilateral     CHOLECYSTECTOMY      COLONOSCOPY  2019    COLONOSCOPY      9 polyps removed    CORONARY ARTERY BYPASS GRAFT      [facility] 2015    CORONARY STENT PLACEMENT      [facility] 2003    HERNIA REPAIR      x2    INGUINAL HERNIA REPAIR      PACEMAKER IMPLANTATION      SHOULDER ROTATOR CUFF REPAIR      x2       Allergies:   Erythromycin, Morphine, and Penicillins    Physical Exam:    Vital Signs:/100 (BP Location: Right arm, Patient Position: Sitting, Cuff Size: Adult)   Temp 98 °F (36.7 °C) (Infrared)   Ht 180.3 cm (71\")   Wt (!) 139 kg (307 lb)   BMI 42.82 kg/m²    BMI: Body mass index is 42.82 kg/m².     GENERAL:           The patient is in no acute distress, and is able to answer all questions appropriately.    Musculoskeletal:            strength is 5 out of 5 bilaterally.        Shoulder abduction is 5 out of 5.         Dorsiflexion is 5/5 Bilaterally       Plantarflexion is 5/5 bilaterally       Hip Flexion 5/5 bilaterally.         " The patient´s gait is antalgic with a cane    Neurologic:          The patient is alert and oriented by 3.          Pupils are equal and reactive to light.              Sensation is equal bilaterally with no deficit.           Reflexes:  1+ through out    Medical Decision Making    Data Review:   CT of the lumbar spine reviewed showing some mild spinal stenosis L1-2 L2-3 and L5-S1.  Probably mild to moderate central canal narrowing at L3-4 and L4-5 but this is not adequately observed based off of CT only.    Diagnosis:   Lumbar spinal stenosis  Neurogenic claudication  Chronic diarrhea  Diabetes  Morbid obesity  Cardiac disease    Treatment Options:   From a neurosurgical perspective from his medical comorbidities he would not be a good surgical candidate secondary to his CABG multiple stents and pacemaker.  Patient also has a BMI of 42 which on its own is a high risk for elective procedure    He has been through conservative therapies with physical therapy without much avail.  Patient has not done pain management at this point    That being said I do think it is prudent that we do get updated imaging with a lumbar spine MRI.  He does have a pacemaker this will need pacemaker protocol and clearance from Dr. Claudio.    Patient will more than likely qualify for pain management.    Would be happy to see him back and give recommendations based off of his imaging       Diagnosis Plan   1. Spinal stenosis, lumbar region, with neurogenic claudication  MRI Lumbar Spine Without Contrast    XR Spine Lumbar Complete With Flex & Ext

## 2024-12-12 ENCOUNTER — TELEPHONE (OUTPATIENT)
Dept: NEUROSURGERY | Facility: CLINIC | Age: 74
End: 2024-12-12
Payer: MEDICARE

## 2024-12-12 NOTE — TELEPHONE ENCOUNTER
----- Message from Mirtha LOERA sent at 12/11/2024 10:39 AM EST -----  Regarding: MRI pacemaker scheduling ([place])  Patient has a pacemaker that is not MRI compatible but the leads are.  Please discuss with patient if he wants to proceed with MRI.  Radiology staff also stated patient cannot be scheduled on the 3T and his weight is 307 pounds which means he will have to go in the scanner with his arms over his head.  Please advise

## 2024-12-17 ENCOUNTER — TELEPHONE (OUTPATIENT)
Dept: ENDOCRINOLOGY | Facility: CLINIC | Age: 74
End: 2024-12-17
Payer: MEDICARE

## 2024-12-17 NOTE — TELEPHONE ENCOUNTER
----- Message from Misael Vidales sent at 12/17/2024 10:34 AM EST -----  Glucose levels have improved.  No change in medication.  ----- Message -----  From: Darcy Avila  Sent: 12/17/2024  10:09 AM EST  To: Misael Vidales MD

## 2025-01-08 ENCOUNTER — OFFICE VISIT (OUTPATIENT)
Dept: ENDOCRINOLOGY | Facility: CLINIC | Age: 75
End: 2025-01-08
Payer: MEDICARE

## 2025-01-08 ENCOUNTER — TELEPHONE (OUTPATIENT)
Dept: ENDOCRINOLOGY | Facility: CLINIC | Age: 75
End: 2025-01-08

## 2025-01-08 VITALS
DIASTOLIC BLOOD PRESSURE: 78 MMHG | WEIGHT: 305 LBS | BODY MASS INDEX: 42.54 KG/M2 | SYSTOLIC BLOOD PRESSURE: 122 MMHG | OXYGEN SATURATION: 97 % | HEART RATE: 75 BPM

## 2025-01-08 DIAGNOSIS — I10 ESSENTIAL HYPERTENSION: ICD-10-CM

## 2025-01-08 DIAGNOSIS — I25.10 ATHEROSCLEROSIS OF NATIVE CORONARY ARTERY OF NATIVE HEART WITHOUT ANGINA PECTORIS: ICD-10-CM

## 2025-01-08 DIAGNOSIS — Z79.4 TYPE 2 DIABETES MELLITUS WITH HYPERGLYCEMIA, WITH LONG-TERM CURRENT USE OF INSULIN: Primary | ICD-10-CM

## 2025-01-08 DIAGNOSIS — E78.2 MIXED HYPERLIPIDEMIA: ICD-10-CM

## 2025-01-08 DIAGNOSIS — E11.65 TYPE 2 DIABETES MELLITUS WITH HYPERGLYCEMIA, WITH LONG-TERM CURRENT USE OF INSULIN: Primary | ICD-10-CM

## 2025-01-08 LAB
ALBUMIN SERPL-MCNC: 3.9 G/DL (ref 3.5–5.2)
ALBUMIN UR-MCNC: <1.2 MG/DL
ALBUMIN/GLOB SERPL: 1.3 G/DL
ALP SERPL-CCNC: 58 U/L (ref 39–117)
ALT SERPL W P-5'-P-CCNC: 20 U/L (ref 1–41)
ANION GAP SERPL CALCULATED.3IONS-SCNC: 14.5 MMOL/L (ref 5–15)
AST SERPL-CCNC: 23 U/L (ref 1–40)
BILIRUB SERPL-MCNC: 0.5 MG/DL (ref 0–1.2)
BUN SERPL-MCNC: 9 MG/DL (ref 8–23)
BUN/CREAT SERPL: 8.7 (ref 7–25)
CALCIUM SPEC-SCNC: 9.6 MG/DL (ref 8.6–10.5)
CHLORIDE SERPL-SCNC: 102 MMOL/L (ref 98–107)
CHOLEST SERPL-MCNC: 202 MG/DL (ref 0–200)
CO2 SERPL-SCNC: 24.5 MMOL/L (ref 22–29)
CREAT SERPL-MCNC: 1.03 MG/DL (ref 0.76–1.27)
CREAT UR-MCNC: 27.4 MG/DL
EGFRCR SERPLBLD CKD-EPI 2021: 76.2 ML/MIN/1.73
EXPIRATION DATE: ABNORMAL
EXPIRATION DATE: ABNORMAL
GLOBULIN UR ELPH-MCNC: 3 GM/DL
GLUCOSE BLDC GLUCOMTR-MCNC: 145 MG/DL (ref 70–130)
GLUCOSE SERPL-MCNC: 99 MG/DL (ref 65–99)
HBA1C MFR BLD: 8.6 % (ref 4.5–5.7)
HDLC SERPL-MCNC: 38 MG/DL (ref 40–60)
LDLC SERPL CALC-MCNC: 141 MG/DL (ref 0–100)
LDLC/HDLC SERPL: 3.66 {RATIO}
Lab: ABNORMAL
Lab: ABNORMAL
MICROALBUMIN/CREAT UR: NORMAL MG/G{CREAT}
POTASSIUM SERPL-SCNC: 4.2 MMOL/L (ref 3.5–5.2)
PROT SERPL-MCNC: 6.9 G/DL (ref 6–8.5)
SODIUM SERPL-SCNC: 141 MMOL/L (ref 136–145)
TRIGL SERPL-MCNC: 124 MG/DL (ref 0–150)
TSH SERPL DL<=0.05 MIU/L-ACNC: 3.66 UIU/ML (ref 0.27–4.2)
VLDLC SERPL-MCNC: 23 MG/DL (ref 5–40)

## 2025-01-08 PROCEDURE — 82043 UR ALBUMIN QUANTITATIVE: CPT | Performed by: INTERNAL MEDICINE

## 2025-01-08 PROCEDURE — 80053 COMPREHEN METABOLIC PANEL: CPT | Performed by: INTERNAL MEDICINE

## 2025-01-08 PROCEDURE — 3074F SYST BP LT 130 MM HG: CPT | Performed by: INTERNAL MEDICINE

## 2025-01-08 PROCEDURE — 3078F DIAST BP <80 MM HG: CPT | Performed by: INTERNAL MEDICINE

## 2025-01-08 PROCEDURE — 36415 COLL VENOUS BLD VENIPUNCTURE: CPT | Performed by: INTERNAL MEDICINE

## 2025-01-08 PROCEDURE — 1160F RVW MEDS BY RX/DR IN RCRD: CPT | Performed by: INTERNAL MEDICINE

## 2025-01-08 PROCEDURE — 84443 ASSAY THYROID STIM HORMONE: CPT | Performed by: INTERNAL MEDICINE

## 2025-01-08 PROCEDURE — 80061 LIPID PANEL: CPT | Performed by: INTERNAL MEDICINE

## 2025-01-08 PROCEDURE — 95251 CONT GLUC MNTR ANALYSIS I&R: CPT | Performed by: INTERNAL MEDICINE

## 2025-01-08 PROCEDURE — 3052F HG A1C>EQUAL 8.0%<EQUAL 9.0%: CPT | Performed by: INTERNAL MEDICINE

## 2025-01-08 PROCEDURE — 1159F MED LIST DOCD IN RCRD: CPT | Performed by: INTERNAL MEDICINE

## 2025-01-08 PROCEDURE — 82570 ASSAY OF URINE CREATININE: CPT | Performed by: INTERNAL MEDICINE

## 2025-01-08 PROCEDURE — 99214 OFFICE O/P EST MOD 30 MIN: CPT | Performed by: INTERNAL MEDICINE

## 2025-01-08 PROCEDURE — 83036 HEMOGLOBIN GLYCOSYLATED A1C: CPT | Performed by: INTERNAL MEDICINE

## 2025-01-08 NOTE — ASSESSMENT & PLAN NOTE
Diabetes is stable.   Adjust insulin.  Diabetes will be reassessed in 6 months.    FreeStyle Destiney 2 CGM was downloaded today.  Data was reviewed from 12/26/24 to 1/8/25.  This showed improved overnight glucose but higher readings during the day.  Time in range was 58%.  Will increase AM insulin to address this.

## 2025-01-08 NOTE — PROGRESS NOTES
Office Note      Date: 2025  Patient Name: Walter Herrera  MRN: [id number]  : 1950    Chief Complaint   Patient presents with    Diabetes     Type II.     Hyperlipidemia     Mixed Hyperlipidemia.     Hypertension     Essential Hypertension.     Subacute Thyroiditis       History of Present Illness:   Walter Herrera is a 74 y.o. male who presents for Diabetes type 2. Diagnosed in: . Treated in past with oral agents. Current treatments: ozempic and premix insulin. Number of insulin shots per day: 2. Checks blood sugar 288 times a day - on FreeStyle Destiney CGM. Has low blood sugar: rare. Aspirin use: Yes. Statin use: Yes. ACE-I/ARB use: Yes. Changes in health since last visit: trigger finger surgery. Last eye exam 10/2023.     Subjective      Diabetic Complications:  Eyes: Yes - BDR  Kidneys: No  Feet: Yes - neuropathy  Heart: Yes     Diet and Exercise:  Meals per day: 3  Minutes of exercise per week: 0 mins.    Review of Systems:   Review of Systems   Constitutional: Negative.    Cardiovascular: Negative.    Gastrointestinal:  Positive for diarrhea.   Endocrine: Negative.        The following portions of the patient's history were reviewed and updated as appropriate: allergies, current medications, past family history, past medical history, past social history, past surgical history, and problem list.    Objective     Visit Vitals  /78 (BP Location: Right arm, Patient Position: Sitting, Cuff Size: Adult)   Pulse 75   Wt (!) 138 kg (305 lb)   SpO2 97%   BMI 42.54 kg/m²       Physical Exam:  Physical Exam  Constitutional:       Appearance: Normal appearance.   Cardiovascular:      Pulses:           Dorsalis pedis pulses are 1+ on the right side and 1+ on the left side.        Posterior tibial pulses are 1+ on the right side and 1+ on the left side.   Feet:      Right foot:      Protective Sensation: 5 sites tested.  2 sites sensed.      Skin integrity: Dry skin present.      Toenail  "Condition: Right toenails are abnormally thick. Fungal disease present.     Left foot:      Protective Sensation: 5 sites tested.  5 sites sensed.      Skin integrity: Dry skin present.      Toenail Condition: Left toenails are abnormally thick. Fungal disease present.  Neurological:      Mental Status: He is alert.         Labs:    HbA1c  Lab Results   Component Value Date    HGBA1C 8.6 (A) 01/08/2025       CMP  Lab Results   Component Value Date    GLUCOSE 183 (H) 10/02/2023    BUN 13 10/02/2023    CREATININE 0.97 10/02/2023    EGFRIFNONA 68 11/05/2021    BCR 13.4 10/02/2023    K 4.5 10/02/2023    CO2 26.0 10/02/2023    CALCIUM 9.8 10/02/2023    AST 26 10/02/2023    ALT 40 10/02/2023        Lipid Panel  Lab Results   Component Value Date    HDL Cholesterol 34 (L) 10/02/2023    LDL Cholesterol  71 10/02/2023    LDL/HDL Ratio 2.02 10/02/2023    Triglycerides 117 10/02/2023        TSH  Lab Results   Component Value Date    TSH 2.970 10/02/2023        Hemoglobin A1C  No components found for: \"HGBA1C\"     Microalbumin/Creatinine  Lab Results   Component Value Date    MALBCRERATIO  10/02/2023      Comment:      Unable to calculate    MICROALBUR <1.2 10/02/2023           Assessment / Plan      Assessment & Plan:  Diagnoses and all orders for this visit:    1. Type 2 diabetes mellitus with hyperglycemia, with long-term current use of insulin (Primary)  Assessment & Plan:  Diabetes is stable.   Adjust insulin.  Diabetes will be reassessed in 6 months.    FreeStyle Destiney 2 CGM was downloaded today.  Data was reviewed from 12/26/24 to 1/8/25.  This showed improved overnight glucose but higher readings during the day.  Time in range was 58%.  Will increase AM insulin to address this.    Orders:  -     POC Glucose, Blood  -     POC Glycosylated Hemoglobin (Hb A1C)  -     Comprehensive Metabolic Panel; Future  -     Lipid Panel; Future  -     Microalbumin / Creatinine Urine Ratio - Urine, Clean Catch; Future  -     TSH; " Future    2. Essential hypertension  Assessment & Plan:  Hypertension is stable and controlled.  Continue current treatment regimen.  Blood pressure will be reassessed in 6 months.      3. Mixed hyperlipidemia  Assessment & Plan:  Continue statin and ezetimibe.  Check lipids today.      4. Atherosclerosis of native coronary artery of native heart without angina pectoris  Assessment & Plan:  Continue ASA, statin, ezetimibe and GLP-1 RA.      Other orders  -     insulin NPH-insulin regular (humuLIN 70/30,novoLIN 70/30) (70-30) 100 UNIT/ML injection; 60 units am, 14 units pm      Current Outpatient Medications   Medication Instructions    acetaminophen (TYLENOL) 650 mg, Every 6 Hours PRN    allopurinol (ZYLOPRIM) 100 MG tablet 1 tablet, Daily    B Complex Vitamins (B COMPLEX 100 PO) 1 tablet, 2 Times Daily    benzonatate (TESSALON) 100 mg    bisoprolol (ZEBeta) 10 MG tablet 1 tablet, Daily    bumetanide (BUMEX) 2 MG tablet 1 tablet, Daily PRN    Continuous Blood Gluc Sensor (FreeStyle Destiney 2 Sensor) misc Use.    Continuous Glucose Sensor (FreeStyle Destiney 3 Plus Sensor) Not Applicable, See Admin Instructions, Change sensor every 15 days.  Dx E11.65    cyclobenzaprine (FLEXERIL) 5 mg, Oral, 3 Times Daily PRN    doxycycline (VIBRAMYCIN) 100 MG capsule 1 capsule, 2 Times Daily    EQ Aspirin Adult Low Dose 81 MG EC tablet Take 1 tablet by mouth once daily    ezetimibe (ZETIA) 10 MG tablet 1 tablet, Daily    fluticasone (FLONASE) 50 MCG/ACT nasal spray 2 sprays, Nasal, Daily    gabapentin (NEURONTIN) 400 mg, Oral, 3 Times Daily    glucose blood (OneTouch Ultra) test strip Test blood sugar twice daily.  Dx E11.65    insulin NPH-insulin regular (humuLIN 70/30,novoLIN 70/30) (70-30) 100 UNIT/ML injection 60 units am, 14 units pm    Insulin Pen Needle (RELION PEN NEEDLE 31G/8MM) 31G X 8 MM misc Use twice daily.    Lancets (onetouch ultrasoft) lancets Test 2 times daily DX E11.65    lidocaine (Lidoderm) 5 % 1 patch, Transdermal,  Every 24 Hours, Remove & Discard patch within 12 hours or as directed by MD    losartan (COZAAR) 75 mg, Oral, Daily    meloxicam (MOBIC) 7.5 mg    minocycline (MINOCIN,DYNACIN) 100 mg, Daily    nitroglycerin (NITROSTAT) 0.4 MG SL tablet 1 under the tongue as needed for angina, may repeat q5mins for up three doses    potassium chloride (K-DUR,KLOR-CON) 20 MEQ CR tablet 20 mEq, Oral, Daily PRN    potassium chloride (KLOR-CON) 10 MEQ CR tablet 10 mEq    probenecid (BENEMID) 500 mg, Oral, 2 Times Daily    rosuvastatin (CRESTOR) 10 mg, Oral, Daily    Semaglutide (1 MG/DOSE) (OZEMPIC) 1 mg, Subcutaneous, Weekly    sildenafil (VIAGRA) 100 MG tablet 1 tablet, Daily    traMADol (ULTRAM) 50 mg      Return in about 6 months (around 7/8/2025) for Recheck with A1c.    Electronically signed by: Misael Vidales MD  01/08/2025

## 2025-01-08 NOTE — TELEPHONE ENCOUNTER
Patient called wanting to know if he came earlier than his appt at 11:15. Advised he could come at 930 since it is open. He also c/o his blood sugar bottoming out this morning to 61. [facility] He ate a few oatmeal cookies and drank a juice, it brought it up.

## 2025-01-09 ENCOUNTER — TELEPHONE (OUTPATIENT)
Dept: ENDOCRINOLOGY | Facility: CLINIC | Age: 75
End: 2025-01-09
Payer: MEDICARE

## 2025-01-22 ENCOUNTER — TELEPHONE (OUTPATIENT)
Dept: ENDOCRINOLOGY | Facility: CLINIC | Age: 75
End: 2025-01-22

## 2025-01-22 NOTE — TELEPHONE ENCOUNTER
Caller: Walter Herrera    Relationship to patient: Self    Best call back number: [phone]    Patient is needing: PATIENTS SUGAR HAS BEEN AROUND 58-60 AFTER HIS INSULIN WAS INCREASED TO 4 UNITS

## 2025-01-22 NOTE — TELEPHONE ENCOUNTER
Spoke with patient.  Unable to download artemio due to not being connected.  Had a few lows last week in the morning and it ran from 57-75.  Has been running 107-117 since 01/14/2024 until about 12pm this afternoon and it dropped to 76.  He felt bad when it occurred.  He is back up to 147 now.

## 2025-01-29 ENCOUNTER — PATIENT ROUNDING (BHMG ONLY) (OUTPATIENT)
Dept: CARDIOLOGY | Facility: CLINIC | Age: 75
End: 2025-01-29
Payer: MEDICARE

## 2025-01-29 ENCOUNTER — OFFICE VISIT (OUTPATIENT)
Dept: CARDIOLOGY | Facility: CLINIC | Age: 75
End: 2025-01-29
Payer: MEDICARE

## 2025-01-29 VITALS
SYSTOLIC BLOOD PRESSURE: 134 MMHG | DIASTOLIC BLOOD PRESSURE: 80 MMHG | BODY MASS INDEX: 42.56 KG/M2 | HEART RATE: 77 BPM | HEIGHT: 71 IN | WEIGHT: 304 LBS | OXYGEN SATURATION: 96 %

## 2025-01-29 DIAGNOSIS — G47.33 OSA TREATED WITH BIPAP: Primary | ICD-10-CM

## 2025-01-29 PROCEDURE — 3075F SYST BP GE 130 - 139MM HG: CPT | Performed by: NURSE PRACTITIONER

## 2025-01-29 PROCEDURE — 99213 OFFICE O/P EST LOW 20 MIN: CPT | Performed by: NURSE PRACTITIONER

## 2025-01-29 PROCEDURE — 1160F RVW MEDS BY RX/DR IN RCRD: CPT | Performed by: NURSE PRACTITIONER

## 2025-01-29 PROCEDURE — 1159F MED LIST DOCD IN RCRD: CPT | Performed by: NURSE PRACTITIONER

## 2025-01-29 PROCEDURE — 3079F DIAST BP 80-89 MM HG: CPT | Performed by: NURSE PRACTITIONER

## 2025-01-29 NOTE — PROGRESS NOTES
..My name is Supriya Cameron and I am the Practice Manager for [facility] [place] [facility].    I would like to thank you for being a loyal patient. If you do not mind I would like to ask you a few questions about your recent visit with us.  Please feel free to reply if you wish to provide us with feedback on your first visit with our practice.    First, could you tell me what went well with your recent visit?    Secondly, we are always looking for ways to make our patients' experiences even better.  Do you have any recommendations on ways we may improve?    Finally, overall were you satisfied with your first visit to us as a [facility] [place] facility?    In the next few days, you will be receiving a Patient Experience Survey.      Thank you for taking the time to answer a few questions today.  I hope you have a good day.

## 2025-01-29 NOTE — ASSESSMENT & PLAN NOTE
He has a known history of severe sleep apnea.  Baseline AHI is 70.    He is on BiPAP therapy.    Download reviewed with good control and good compliance.    He is benefiting from BiPAP therapy.    We plan to continue BiPAP therapy.    Prescription to DME of patient's choice for BiPAP supplies.    Plan follow-up for severe sleep apnea on BiPAP therapy in 1 year or sooner for any MONIQUE or PAP concerns.

## 2025-01-29 NOTE — PROGRESS NOTES
Follow-Up Sleep Consult     Date:   2025  Name: Walter Herrera  :   1950  PCP: La Nena Ramirez MD    Chief Complaint   Patient presents with    Sleep Apnea       Subjective     History of Present Illness  Walter Herrera is a 74 y.o. male who presents today for follow-up on MONIQUE.  He comes in today for his annual follow-up for his known history of severe sleep apnea on BiPAP therapy.    Sleep history:    MONIQUE baseline AHI 70 on 2017 split study.  CPAP failed to correct sleep apnea.  Titrated to BiPAP 14/10    Current MONIQUE therapy BiPAP 18/12 PS4      Current mask used is Nasal Pillow - has used chin strap but he reports that DME has stopped getting the style that he likes.     Device Functioning Well: Yes, but he is getting dry at times.   Mask Fit Comfortable: Yes  Air Flow Comfortable: Yes  DME Helpful for Supplies: Yes  Sleep is rested: Yes    Device Download:                     The patient's relevant past medical, surgical, family, and social history reviewed and updated in Epic as appropriate.    Past Medical History:   Diagnosis Date    Abnormal ECG     Arrhythmia     Arthritis     Atrial fibrillation     Benign essential hypertension     BiPAP (biphasic positive airway pressure) dependence     Blood clot in vein     Coronary artery disease     Coronary atherosclerosis     Depression     Diabetes mellitus     Disorder of nervous system     due to type 2 diabetes mellitus-Abstracted from [place]    Gout     Hypoglycemia due to type 2 diabetes mellitus     Long term current use of insulin     Measles     Mixed hyperlipidemia     Near syncope     Obesity     Sleep apnea     Stroke     Subacute thyroiditis     Type 2 diabetes mellitus, uncontrolled     Wears dentures     Wears glasses     Wears hearing aid in both ears     sometimes     Past Surgical History:   Procedure Laterality Date    ATRIAL APPENDAGE EXCLUSION LEFT WITH TRANSESOPHAGEAL ECHOCARDIOGRAM N/A 2021     Procedure: Atrial Appendage Occlusion (9/27/21) on Warfarin DNS;  Surgeon: Derek Vergara MD;  Location: [facility] INVASIVE LOCATION;  Service: Cardiology;  Laterality: N/A;    BASAL CELL CARCINOMA EXCISION      CARDIAC CATHETERIZATION      [facility] 2003    CATARACT EXTRACTION Bilateral     CHOLECYSTECTOMY      COLONOSCOPY  2019    COLONOSCOPY      9 polyps removed    CORONARY ARTERY BYPASS GRAFT      [facility] 2015    CORONARY STENT PLACEMENT      [facility] 2003    HERNIA REPAIR      x2    INGUINAL HERNIA REPAIR      PACEMAKER IMPLANTATION      SHOULDER ROTATOR CUFF REPAIR      x2    TRIGGER FINGER RELEASE Right        Allergies   Allergen Reactions    Erythromycin Diarrhea    Morphine Unknown - Low Severity     Blisters    Penicillins Rash     Prior to Admission medications    Medication Sig Start Date End Date Taking? Authorizing Provider   acetaminophen (TYLENOL) 650 MG/20.3ML solution solution Take 20.3 mL by mouth Every 6 (Six) Hours As Needed for Mild Pain.   Yes Toña Hartman MD   allopurinol (ZYLOPRIM) 100 MG tablet Take 1 tablet by mouth Daily.   Yes Toña Hartman MD   B Complex Vitamins (B COMPLEX 100 PO) Take 1 tablet by mouth 2 (Two) Times a Day. as directed 11/24/23  Yes Toña Hartman MD   benzonatate (TESSALON) 100 MG capsule 1 capsule.   Yes Toña Hartman MD   bisoprolol (ZEBeta) 10 MG tablet Take 1 tablet by mouth Daily. 11/12/23  Yes Toña Hartman MD   bumetanide (BUMEX) 2 MG tablet 1 tablet Daily As Needed.   Yes Toña Hartman MD   Continuous Blood Gluc Sensor (FreeStyle Destiney 2 Sensor) misc Use.   Yes Toña Hartman MD   Continuous Glucose Sensor (FreeStyle Destiney 3 Plus Sensor) Use See Admin Instructions. Change sensor every 15 days.  Dx E11.65 10/30/24  Yes Misael Vidales MD   cyclobenzaprine (FLEXERIL) 5 MG tablet Take 1 tablet by mouth 3 (Three) Times a Day As Needed for Muscle  Spasms. 10/1/24  Yes Grayson Mendoza PA-C   doxycycline (VIBRAMYCIN) 100 MG capsule Take 1 capsule by mouth 2 (Two) Times a Day.   Yes Toña Hartman MD EQ Aspirin Adult Low Dose 81 MG EC tablet Take 1 tablet by mouth once daily 8/2/22  Yes Derek Vergara MD   ezetimibe (ZETIA) 10 MG tablet Take 1 tablet by mouth Daily. 11/12/23  Yes Toña Hartman MD   fluticasone (FLONASE) 50 MCG/ACT nasal spray 2 sprays into the nostril(s) as directed by provider Daily. 12/6/22  Yes Misael Vidales MD   gabapentin (NEURONTIN) 400 MG capsule Take 1 capsule by mouth 3 (Three) Times a Day. 10/19/24  Yes Gabo Telles MD   glucose blood (OneTouch Ultra) test strip Test blood sugar twice daily.  Dx E11.65 10/27/21  Yes Misael Vidales MD   insulin NPH-insulin regular (humuLIN 70/30,novoLIN 70/30) (70-30) 100 UNIT/ML injection 60 units am, 14 units pm 1/8/25  Yes Misael Vidales MD   Insulin Pen Needle (RELION PEN NEEDLE 31G/8MM) 31G X 8 MM misc Use twice daily. 6/4/24  Yes Misael Vidales MD   Lancets (onetouch ultrasoft) lancets Test 2 times daily DX E11.65 1/28/22  Yes Misael Vidales MD   lidocaine (Lidoderm) 5 % Place 1 patch on the skin as directed by provider Daily. Remove & Discard patch within 12 hours or as directed by MD 10/1/24  Yes Grayson Mendoza PA-C   losartan (COZAAR) 50 MG tablet TAKE 1 & 1/2 (ONE & ONE-HALF) TABLETS BY MOUTH ONCE DAILY 1/11/24  Yes Misael Vidales MD   meloxicam (MOBIC) 7.5 MG tablet 1 tablet. 12/4/24  Yes Toña Hartman MD   minocycline (MINOCIN,DYNACIN) 100 MG capsule Take 1 capsule by mouth Daily.   Yes Toña Hartman MD   nitroglycerin (NITROSTAT) 0.4 MG SL tablet 1 under the tongue as needed for angina, may repeat q5mins for up three doses 10/4/21  Yes Farnaz Lindsey APRN   potassium chloride (K-DUR,KLOR-CON) 20 MEQ CR tablet Take 1 tablet by mouth Daily As Needed (with bumex). 9/27/21  Yes Derek Vergara  "MD YANETH   potassium chloride (KLOR-CON) 10 MEQ CR tablet 1 tablet. 9/5/24  Yes Provider, MD Toña   probenecid (BENEMID) 500 MG tablet Take 1 tablet by mouth 2 (Two) Times a Day. 5/23/23  Yes Misael Vidales MD   rosuvastatin (CRESTOR) 10 MG tablet Take 1 tablet by mouth Daily. 6/25/24  Yes Misael Vidales MD   Semaglutide, 1 MG/DOSE, (OZEMPIC) 4 MG/3ML solution pen-injector Inject 1 mg under the skin into the appropriate area as directed 1 (One) Time Per Week. 7/17/24  Yes Misael Vidales MD   sildenafil (VIAGRA) 100 MG tablet Take 1 tablet by mouth Daily.   Yes Provider, MD Toña   traMADol (ULTRAM) 50 MG tablet 1 tablet. 12/4/24  Yes Provider, MD Toña     Family History   Problem Relation Age of Onset    Heart attack Mother     Heart disease Mother     Dementia Mother     Hepatitis Father     Heart disease Sister     Heart disease Brother     Heart disease Brother        Objective     Vital Signs:  /80 (BP Location: Right arm, Patient Position: Sitting, Cuff Size: Large Adult)   Pulse 77   Ht 180.3 cm (71\")   Wt (!) 138 kg (304 lb)   SpO2 96%   BMI 42.40 kg/m²              Physical Exam  Constitutional:       Appearance: He is obese.   HENT:      Head: Normocephalic.      Nose: Nose normal.      Mouth/Throat:      Mouth: Mucous membranes are moist.   Pulmonary:      Effort: Pulmonary effort is normal.   Musculoskeletal:      Cervical back: Neck supple.   Skin:     General: Skin is warm and dry.   Neurological:      Mental Status: He is alert and oriented to person, place, and time.   Psychiatric:         Mood and Affect: Mood normal.         Behavior: Behavior normal.         Thought Content: Thought content normal.         The following data was reviewed by: HAMILTON Whitten on 01/29/2025:    PAP download reviewed: 30-day download as above.  I have reviewed and interpreted the data on the download at today's visit         Assessment and Plan     Diagnoses " and all orders for this visit:    1. MONIQUE treated with BiPAP (Primary)  Assessment & Plan:  He has a known history of severe sleep apnea.  Baseline AHI is 70.    He is on BiPAP therapy.    Download reviewed with good control and good compliance.    He is benefiting from BiPAP therapy.    We plan to continue BiPAP therapy.    Prescription to DME of patient's choice for BiPAP supplies.    Plan follow-up for severe sleep apnea on BiPAP therapy in 1 year or sooner for any MONIQUE or PAP concerns.      Orders:  -     PAP Therapy        Report if any new/changing symptoms immediately         Follow Up  Return in about 1 year (around 1/29/2026) for MONIQUE.  Patient was given instructions and counseling regarding his condition or for health maintenance advice. Please see specific information pulled into the AVS if appropriate.

## 2025-02-27 ENCOUNTER — TELEPHONE (OUTPATIENT)
Dept: ENDOCRINOLOGY | Facility: CLINIC | Age: 75
End: 2025-02-27
Payer: MEDICARE

## 2025-02-27 NOTE — TELEPHONE ENCOUNTER
Spoke to pt-he voiced understanding.  He wants to change from artemio to dexcom g7.  He uses [employer].      Spoke to [facility] - they will fax an order to be filled out and faxed back with chart notes

## 2025-02-27 NOTE — TELEPHONE ENCOUNTER
----- Message from Misael Vidales sent at 2/26/2025  1:36 PM EST -----  Glucose levels are okay.  No change in insulin at this time.  ----- Message -----  From: Nadiya Avila  Sent: 2/26/2025   1:23 PM EST  To: Misael Vidales MD

## 2025-03-06 ENCOUNTER — TELEPHONE (OUTPATIENT)
Dept: ENDOCRINOLOGY | Facility: CLINIC | Age: 75
End: 2025-03-06
Payer: MEDICARE

## 2025-03-06 NOTE — TELEPHONE ENCOUNTER
Spoke with patient.  Advised that we had not received patient assistance.  He is going to contact [employer].

## 2025-03-10 ENCOUNTER — TELEPHONE (OUTPATIENT)
Dept: ENDOCRINOLOGY | Facility: CLINIC | Age: 75
End: 2025-03-10
Payer: MEDICARE

## 2025-03-10 NOTE — TELEPHONE ENCOUNTER
Called pt, advised him to resume usual insulin tonight (premixed) with resuming normal diet. Pt has Destiney on to watch BG levels as well.

## 2025-03-10 NOTE — TELEPHONE ENCOUNTER
PATIENT HAD HEART CATH THIS MORNING TO HAVE STINT PLACED. HE HAS HAD NO INSULIN TODAY AND JUST GOT HOME FROM THE PROCEDURE. HE NEEDS TO BE ADVISED ON HOW MUCH INSULIN HE IS SUPPOSE TO TAKE NOW SINCE HE IS BACK HOME. PHONE NUMBER [phone]. HIS BLOOD SUGAR  RIGHT NOW. HE WAS NOT GIVEN ANY INSTRUCTIONS AFTER THE HEART CATH ABOUT TAKING INSULIN AFTER PROCEDURE.

## 2025-03-20 ENCOUNTER — HOSPITAL ENCOUNTER (OUTPATIENT)
Dept: MRI IMAGING | Facility: HOSPITAL | Age: 75
Discharge: HOME | End: 2025-03-20
Admitting: PHYSICIAN ASSISTANT
Payer: MEDICARE

## 2025-03-20 VITALS — HEART RATE: 102 BPM | SYSTOLIC BLOOD PRESSURE: 117 MMHG | DIASTOLIC BLOOD PRESSURE: 74 MMHG | OXYGEN SATURATION: 97 %

## 2025-03-20 DIAGNOSIS — M48.062 SPINAL STENOSIS, LUMBAR REGION, WITH NEUROGENIC CLAUDICATION: ICD-10-CM

## 2025-03-20 PROCEDURE — 72148 MRI LUMBAR SPINE W/O DYE: CPT

## 2025-03-24 ENCOUNTER — OFFICE VISIT (OUTPATIENT)
Dept: NEUROSURGERY | Facility: CLINIC | Age: 75
End: 2025-03-24
Payer: MEDICARE

## 2025-03-24 VITALS — BODY MASS INDEX: 42.8 KG/M2 | TEMPERATURE: 97.5 F | WEIGHT: 305.7 LBS | HEIGHT: 71 IN

## 2025-03-24 DIAGNOSIS — M48.062 SPINAL STENOSIS, LUMBAR REGION, WITH NEUROGENIC CLAUDICATION: Primary | ICD-10-CM

## 2025-03-24 DIAGNOSIS — I21.9 MYOCARDIAL INFARCTION, UNSPECIFIED MI TYPE, UNSPECIFIED ARTERY: ICD-10-CM

## 2025-03-24 DIAGNOSIS — Z86.73 HISTORY OF CVA (CEREBROVASCULAR ACCIDENT): ICD-10-CM

## 2025-03-24 DIAGNOSIS — Z79.4 TYPE 2 DIABETES MELLITUS WITH HYPERGLYCEMIA, WITH LONG-TERM CURRENT USE OF INSULIN: ICD-10-CM

## 2025-03-24 DIAGNOSIS — E11.65 TYPE 2 DIABETES MELLITUS WITH HYPERGLYCEMIA, WITH LONG-TERM CURRENT USE OF INSULIN: ICD-10-CM

## 2025-03-24 PROCEDURE — 99214 OFFICE O/P EST MOD 30 MIN: CPT | Performed by: PHYSICIAN ASSISTANT

## 2025-03-24 RX ORDER — ATORVASTATIN CALCIUM 80 MG/1
1 TABLET, FILM COATED ORAL DAILY
COMMUNITY
Start: 2025-03-05

## 2025-03-24 RX ORDER — CLOPIDOGREL BISULFATE 75 MG/1
1 TABLET ORAL DAILY
COMMUNITY

## 2025-03-24 RX ORDER — INSULIN ASPART 100 [IU]/ML
INJECTION, SUSPENSION SUBCUTANEOUS
COMMUNITY

## 2025-03-24 NOTE — PROGRESS NOTES
Patient: Walter Herrera  : 1950    Primary Care Provider: La Nena Ramirez MD    Chief Complaint: Low back pain after fall    History of Present Illness:          Patient is a 73-year-old  who unfortunately was injured on 1/3/2023 at [facility] when he slipped and fell on water.  Patient is currently under litigation for this.     Patient states since that time has had low back pain as well as right leg pain that is limiting his activities of daily living.  Patient is here to discuss his CT scan of his lumbar spine.     Patient is on Ozempic and has a A1c of 7.8.  Patient is lost a significant amount of weight on Ozempic but is still with a BMI of 42.82.      After seeing me last the patient unfortunately was hospitalized had 3 stents placed secondary to a heart attack.  Patient was on Coumadin prior but had a Watchman device and has been off Coumadin since.    Patient was able to get his MRI but unable to get a flexion-extension x-ray and is here for review of the MRI.  Patient continues to have ongoing low back pain and this pain limits him.  Patient states that this was never present until the fall that he sustained at [facility]    Review of Systems   Constitutional:  Negative for activity change, appetite change, chills, diaphoresis, fatigue, fever and unexpected weight change.   HENT:  Negative for congestion, dental problem, drooling, ear discharge, ear pain, facial swelling, hearing loss, mouth sores, nosebleeds, postnasal drip, rhinorrhea, sinus pressure, sinus pain, sneezing, sore throat, tinnitus, trouble swallowing and voice change.    Eyes:  Negative for photophobia, pain, discharge, redness, itching and visual disturbance.   Respiratory:  Negative for apnea, cough, choking, chest tightness, shortness of breath, wheezing and stridor.    Cardiovascular:  Negative for chest pain, palpitations and leg swelling.   Gastrointestinal:  Negative for abdominal distention, abdominal pain,  anal bleeding, blood in stool, constipation, diarrhea, nausea, rectal pain and vomiting.   Endocrine: Negative for cold intolerance, heat intolerance, polydipsia, polyphagia and polyuria.   Genitourinary:  Negative for decreased urine volume, difficulty urinating, dysuria, enuresis, flank pain, frequency, genital sores, hematuria, penile discharge, penile pain, penile swelling, scrotal swelling, testicular pain and urgency.   Musculoskeletal:  Positive for back pain and gait problem. Negative for arthralgias, joint swelling, myalgias, neck pain and neck stiffness.   Skin:  Negative for color change, pallor, rash and wound.   Allergic/Immunologic: Negative for environmental allergies, food allergies and immunocompromised state.   Neurological:  Negative for dizziness, tremors, seizures, syncope, facial asymmetry, speech difficulty, weakness, light-headedness, numbness and headaches.   Hematological:  Negative for adenopathy. Does not bruise/bleed easily.   Psychiatric/Behavioral:  Negative for agitation, behavioral problems, confusion, decreased concentration, dysphoric mood, hallucinations, self-injury, sleep disturbance and suicidal ideas. The patient is not nervous/anxious and is not hyperactive.    All other systems reviewed and are negative.      Past Medical History:     Past Medical History:   Diagnosis Date    A-fib     Abnormal ECG     Arrhythmia     Arthritis     Atrial fibrillation     Benign essential hypertension     BiPAP (biphasic positive airway pressure) dependence     Blood clot in vein     Coronary artery disease     Coronary atherosclerosis     Depression     Diabetes mellitus     Disorder of nervous system     due to type 2 diabetes mellitus-Abstracted from [place]    Gout     Hypoglycemia due to type 2 diabetes mellitus     Long term current use of insulin     Measles     Mixed hyperlipidemia     Myocardial infarction     Near syncope     Obesity     Sleep apnea     Stroke     Subacute  "thyroiditis     Type 2 diabetes mellitus, uncontrolled     Wears dentures     Wears glasses     Wears hearing aid in both ears     sometimes       Family History:     Family History   Problem Relation Age of Onset    Heart attack Mother     Heart disease Mother     Dementia Mother     Hepatitis Father     Heart disease Sister     Heart disease Brother     Heart disease Brother        Social History:    reports that he quit smoking about 52 years ago. His smoking use included pipe. He has been exposed to tobacco smoke. He has never used smokeless tobacco. He reports that he does not currently use alcohol after a past usage of about 1.0 standard drink of alcohol per week. He reports that he does not use drugs.   SMOKING STATUS: Non-smoker    Surgical History:     Past Surgical History:   Procedure Laterality Date    ATRIAL APPENDAGE EXCLUSION LEFT WITH TRANSESOPHAGEAL ECHOCARDIOGRAM N/A 09/27/2021    Procedure: Atrial Appendage Occlusion (9/27/21) on Warfarin DNS;  Surgeon: Derek Vergara MD;  Location: [facility] INVASIVE LOCATION;  Service: Cardiology;  Laterality: N/A;    BASAL CELL CARCINOMA EXCISION      CARDIAC CATHETERIZATION      [facility] 2003    CAROTID STENT      CATARACT EXTRACTION Bilateral     CHOLECYSTECTOMY      COLONOSCOPY  2019    COLONOSCOPY      9 polyps removed    CORONARY ARTERY BYPASS GRAFT      [facility] 2015    CORONARY STENT PLACEMENT      [facility] 2003    HERNIA REPAIR      x2    INGUINAL HERNIA REPAIR      PACEMAKER IMPLANTATION      SHOULDER ROTATOR CUFF REPAIR      x2    TRIGGER FINGER RELEASE Right        Allergies:   Erythromycin, Morphine, and Penicillins    Physical Exam:    Vital Signs:Temp 97.5 °F (36.4 °C) (Temporal)   Ht 180.3 cm (71\")   Wt (!) 139 kg (305 lb 11.2 oz)   BMI 42.64 kg/m²    BMI: Body mass index is 42.64 kg/m².     GENERAL:           The patient is in no acute distress, and is able to answer all questions " appropriately.     Musculoskeletal:             strength is 5 out of 5 bilaterally.        Shoulder abduction is 5 out of 5.         Dorsiflexion is 5/5 Bilaterally       Plantarflexion is 5/5 bilaterally       Hip Flexion 5/5 bilaterally.         The patient´s gait is antalgic with a cane     Neurologic:           The patient is alert and oriented by 3.          Pupils are equal and reactive to light.              Sensation is equal bilaterally with no deficit.           Reflexes:  1+ through out     Medical Decision Making    Data Review:   MRI of the lumbar spine reviewed with the patient and showed him the pictures.  Patient has a left-sided synovial cyst formed at the L2-3 area causing severe neuroforaminal narrowing central canal has mild to moderate stenosis at that level as well as L3-4.  L3-4 has right neuroforaminal narrowing noted severe    L4-5 has moderate to severe central canal stenosis but with bilateral neuroforaminal narrowing worse on the left than the right with facet degenerative changes.  L4-5 also has a synovial cyst on the left    L5-S1 has bilateral severe neuroforaminal narrowing with no measurable central canal narrowing but bad facet disease noted as well    Diagnosis:   Chronic low back pain   standing walking intolerance  Neurogenic claudication  Morbid obesity  Recent heart attack   On dual antiplatelet therapy    Treatment Options:   Currently we would have no elective neurosurgical options considering he is on dual antiplatelet therapy and will need to be's on that likely for 6 months.  His cardiologist Dr. Claudio.  Once he gives clearance to come off of aspirin/Plavix we be happy to see him back to discuss intervention mesh methods.    Because he is tolerating reasonably well with Tylenol only considerations for injections are likely his best option however a multiple level lumbar fusion is likely needed for correction of everything and I see on his spine.    I think that would  carry with a mortality secondary to his obesity and heart disease at baseline.  I would avoid that at all costs.     Patient understands surgeries that would need to be performed for preserving life and limb.  Patient currently has a fourth vessel that likely needs a CABG per his report.     I told him we be happy to see him back in about 6 months to consider interventions.  Patient understands that his medical comorbidities may preclude him for any elective surgeries currently       Diagnosis Plan   1. Spinal stenosis, lumbar region, with neurogenic claudication        2. Type 2 diabetes mellitus with hyperglycemia, with long-term current use of insulin        3. History of CVA (cerebrovascular accident)        4. Myocardial infarction, unspecified MI type, unspecified artery

## 2025-03-31 ENCOUNTER — TELEPHONE (OUTPATIENT)
Dept: ENDOCRINOLOGY | Facility: CLINIC | Age: 75
End: 2025-03-31
Payer: MEDICARE

## 2025-03-31 NOTE — TELEPHONE ENCOUNTER
M for patient to call back regarding the below message:    Dr Vidales has reviewed glucose log and has instructions for patient to increase both AM and PM inuslin doses by 3units each.-    ---- Message from Misael Vidales sent at 3/31/2025  1:47 PM EDT -----  Let's increase both AM and PM insulin doses by 3u each please.  ----- Message -----  From: Darcy Avila  Sent: 3/31/2025   1:40 PM EDT  To: Misael Vidales MD

## 2025-04-01 ENCOUNTER — TELEPHONE (OUTPATIENT)
Dept: ENDOCRINOLOGY | Facility: CLINIC | Age: 75
End: 2025-04-01

## 2025-04-01 NOTE — TELEPHONE ENCOUNTER
----- Message from Misael Vidales sent at 3/31/2025  1:47 PM EDT -----  Let's increase both AM and PM insulin doses by 3u each please.  ----- Message -----  From: Darcy Avila  Sent: 3/31/2025   1:40 PM EDT  To: Misael Vidales MD

## 2025-04-01 NOTE — TELEPHONE ENCOUNTER
PT CALLED STATING HE HAD THREE MORE STINTS PUT IN IN THE PAST MONTH. HE STATED HE HAD BEEN HOSPITALIZED. HE REQUESTED A CALL BACK TO CONSULT. HE STATED HE MAY HAVE TO HAVE OPEN HEART SURGERY.

## 2025-04-23 ENCOUNTER — TELEPHONE (OUTPATIENT)
Dept: ENDOCRINOLOGY | Facility: CLINIC | Age: 75
End: 2025-04-23
Payer: MEDICARE

## 2025-04-23 NOTE — TELEPHONE ENCOUNTER
Spoke with patient regarding glucose log viewed by Dr Vidales.  Dr Vidales did want any changes made at this time.  Patient stated that March 3rd 2025 he had an MI.  Patient had stents placed.  Dr Claudio put the patient on an injectable cholesterol med which he is starting soon. Due to the MI and new meds he feels as if this is fluctuating his glucose levels.  Patient also states that his insurance would not cover the Ozempic any longer.

## 2025-04-23 NOTE — TELEPHONE ENCOUNTER
----- Message from Misael Vidales sent at 4/23/2025 12:51 PM EDT -----  Glucose levels are fluctuating some but I don't want to make any insulin adjustments at this time.  ----- Message -----  From: Darcy Avila  Sent: 4/23/2025  11:45 AM EDT  To: Misael Vidales MD

## 2025-04-25 ENCOUNTER — TELEPHONE (OUTPATIENT)
Dept: NEUROSURGERY | Facility: CLINIC | Age: 75
End: 2025-04-25

## 2025-04-25 NOTE — TELEPHONE ENCOUNTER
Caller: WES RAGSDALE     Relationship to patient: SELF     Best call back number: [phone]    Chief complaint: APPT SET FOR 9/29 - NEEDING SOONER APPT     Type of visit: FOLLOW UP    Requested date: ASAP    If rescheduling, when is the original appointment: 9/29    Additional notes: PT CALLED IN REGARDS TO THEIR F/U APPT WITH SONIYA REYES 9/29/25 AND STATES THIS DATE WAS CONTINGENT UPON REPORT FROM CARDIO DR MARIANO - PT STATES THAT THE DR IS OKAY WITH WHAT NEEDS TO BE DONE AND THE APPT COULD BE SCHEDULED SOONER- NO FAXED DOCUMENTATION FROM [facility] AT THIS TIME- PT STATES WE MAY NEED TO ASK FOR IT -PLEASE ADVISE

## 2025-05-12 ENCOUNTER — TELEPHONE (OUTPATIENT)
Dept: ENDOCRINOLOGY | Facility: CLINIC | Age: 75
End: 2025-05-12

## 2025-05-12 NOTE — TELEPHONE ENCOUNTER
Pt returned call, states BG is now running up to 400. He has been taking 60U in the morning and 18U in the evening to mixed insulin. He keeps to a routine schedule with insulin dosing, reports normal appetite, no illness, no new meds other than a blood thinner, occ. Dizziness. He has reduced carb intake and eats mostly protein and vegetables. Fasting glucose runs around 280, then glucose climbs the rest of the day.   Had pt check expiration dates on insulin and confirmed proper storage.   Pt OK to wait for Dr. Vidales to provide recommendations tomorrow.

## 2025-05-12 NOTE — TELEPHONE ENCOUNTER
Caller: Walter Herrera    Relationship to patient: Self    Best call back number: [phone]    Patient is needing: PT CALLING NEEDING TO SPEAK TO JANN, HE SAID HIS BLOOD SUGAR HAS BEEN RUNNING HIGH AND HE CAN'T GET IT DOWN. SHE WAS WITH A PT WHEN I TRIED TO WT. PLEASE CALL BACK.

## 2025-05-13 NOTE — TELEPHONE ENCOUNTER
Spoke with pt, verbalized understanding. Advised him to call back later this week/early next week to provide updated readings. He will take 70U/24U

## 2025-05-20 ENCOUNTER — TELEPHONE (OUTPATIENT)
Dept: ENDOCRINOLOGY | Facility: CLINIC | Age: 75
End: 2025-05-20
Payer: MEDICARE

## 2025-05-20 NOTE — TELEPHONE ENCOUNTER
Called patient and informed him that after looking at his glucose logs, Dr. Vidales wanted to try increasing breakfast insulin to 80 units. Patient verbalized consent, said he's getting lower readings with new Destiney 3+ and if his sugar gets too low he might ease off taking the full 80 units. I told Mr. Herrera to keep us updated on his glucose numbers.

## 2025-05-28 DIAGNOSIS — E11.49 TYPE 2 DIABETES MELLITUS WITH NEUROLOGICAL MANIFESTATIONS: ICD-10-CM

## 2025-05-28 DIAGNOSIS — I10 ESSENTIAL HYPERTENSION: ICD-10-CM

## 2025-05-28 RX ORDER — LOSARTAN POTASSIUM 50 MG/1
75 TABLET ORAL DAILY
Qty: 135 TABLET | Refills: 3 | Status: SHIPPED | OUTPATIENT
Start: 2025-05-28

## 2025-05-28 RX ORDER — GABAPENTIN 400 MG/1
400 CAPSULE ORAL 3 TIMES DAILY
Qty: 270 CAPSULE | Refills: 1 | Status: SHIPPED | OUTPATIENT
Start: 2025-05-28

## 2025-05-28 NOTE — TELEPHONE ENCOUNTER
Rx Refill Note  Requested Prescriptions     Pending Prescriptions Disp Refills    gabapentin (NEURONTIN) 400 MG capsule [Pharmacy Med Name: Gabapentin 400 MG Oral Capsule] 270 capsule 0     Sig: TAKE 1 CAPSULE BY MOUTH THREE TIMES DAILY      Last office visit with prescribing clinician: Visit date not found     Next office visit with prescribing clinician: Visit date not found       Reji Weldon MA  05/28/25, 11:59 EDT

## 2025-06-12 ENCOUNTER — TELEPHONE (OUTPATIENT)
Dept: ENDOCRINOLOGY | Facility: CLINIC | Age: 75
End: 2025-06-12
Payer: MEDICARE

## 2025-06-12 NOTE — TELEPHONE ENCOUNTER
----- Message from Misael Vidales sent at 6/11/2025 10:53 AM EDT -----  Glucose levels have improved.  Stay on same insulin doses for now.  ----- Message -----  From: Nadiya Avila  Sent: 6/11/2025  10:04 AM EDT  To: Misael Vidales MD

## 2025-07-15 ENCOUNTER — TELEPHONE (OUTPATIENT)
Dept: ENDOCRINOLOGY | Facility: CLINIC | Age: 75
End: 2025-07-15
Payer: MEDICARE

## 2025-07-15 NOTE — TELEPHONE ENCOUNTER
Misael Vidales MD  P [facility] Clinical Pool  Let's decrease the PM insulin by 2u due to overnight hypoglycemia.    Spoke to pt-he voiced understanding  edited med list

## 2025-07-22 ENCOUNTER — OFFICE VISIT (OUTPATIENT)
Dept: ENDOCRINOLOGY | Facility: CLINIC | Age: 75
End: 2025-07-22
Payer: MEDICARE

## 2025-07-22 VITALS
WEIGHT: 303.2 LBS | SYSTOLIC BLOOD PRESSURE: 118 MMHG | HEIGHT: 71 IN | DIASTOLIC BLOOD PRESSURE: 73 MMHG | BODY MASS INDEX: 42.45 KG/M2 | HEART RATE: 74 BPM | OXYGEN SATURATION: 98 %

## 2025-07-22 DIAGNOSIS — I10 ESSENTIAL HYPERTENSION: ICD-10-CM

## 2025-07-22 DIAGNOSIS — E78.2 MIXED HYPERLIPIDEMIA: ICD-10-CM

## 2025-07-22 DIAGNOSIS — I25.10 ATHEROSCLEROSIS OF NATIVE CORONARY ARTERY OF NATIVE HEART WITHOUT ANGINA PECTORIS: ICD-10-CM

## 2025-07-22 DIAGNOSIS — E11.65 TYPE 2 DIABETES MELLITUS WITH HYPERGLYCEMIA, WITH LONG-TERM CURRENT USE OF INSULIN: Primary | ICD-10-CM

## 2025-07-22 DIAGNOSIS — E11.49 TYPE 2 DIABETES MELLITUS WITH NEUROLOGICAL MANIFESTATIONS: ICD-10-CM

## 2025-07-22 DIAGNOSIS — Z79.4 TYPE 2 DIABETES MELLITUS WITH HYPERGLYCEMIA, WITH LONG-TERM CURRENT USE OF INSULIN: Primary | ICD-10-CM

## 2025-07-22 LAB
EXPIRATION DATE: ABNORMAL
EXPIRATION DATE: ABNORMAL
GLUCOSE BLDC GLUCOMTR-MCNC: 300 MG/DL (ref 70–130)
HBA1C MFR BLD: 11.1 % (ref 4.5–5.7)
Lab: ABNORMAL
Lab: ABNORMAL

## 2025-07-22 PROCEDURE — 1160F RVW MEDS BY RX/DR IN RCRD: CPT | Performed by: INTERNAL MEDICINE

## 2025-07-22 PROCEDURE — 1159F MED LIST DOCD IN RCRD: CPT | Performed by: INTERNAL MEDICINE

## 2025-07-22 PROCEDURE — 3078F DIAST BP <80 MM HG: CPT | Performed by: INTERNAL MEDICINE

## 2025-07-22 PROCEDURE — 3046F HEMOGLOBIN A1C LEVEL >9.0%: CPT | Performed by: INTERNAL MEDICINE

## 2025-07-22 PROCEDURE — 95251 CONT GLUC MNTR ANALYSIS I&R: CPT | Performed by: INTERNAL MEDICINE

## 2025-07-22 PROCEDURE — 3074F SYST BP LT 130 MM HG: CPT | Performed by: INTERNAL MEDICINE

## 2025-07-22 PROCEDURE — 99214 OFFICE O/P EST MOD 30 MIN: CPT | Performed by: INTERNAL MEDICINE

## 2025-07-22 PROCEDURE — 83036 HEMOGLOBIN GLYCOSYLATED A1C: CPT | Performed by: INTERNAL MEDICINE

## 2025-07-22 NOTE — ASSESSMENT & PLAN NOTE
Continue statin and ezetimibe.  LDL above goal last visit.  Work on diet/exercise.  Plan to recheck next visit.

## 2025-07-22 NOTE — ASSESSMENT & PLAN NOTE
Diabetes is worsening.  He has been out of ozempic for about 5 months.  Was getting it through pt assistance.   Adjust insulin doses.  Try resuming ozempic if we can.  Diabetes will be reassessed in 3 months.    FreeStyle Destiney 3 CGM was downloaded today.  Data was reviewed from 7/9/25 to 7/22/25.  This showed improvement in overnight glucose but higher during the day and evening.  Will increase AM insulin to address this.  Time in range was 20%.

## 2025-07-22 NOTE — PROGRESS NOTES
"     Office Note      Date: 2025  Patient Name: Walter Herrera  MRN: [id number]  : 1950    Chief Complaint   Patient presents with    Follow-up       History of Present Illness:   Walter Herrera is a 74 y.o. male who presents for Diabetes type 2. Diagnosed in: . Treated in past with oral agents. Current treatments: premix insulin. Number of insulin shots per day: 2. Checks blood sugar 288 times a day - on FreeStyle Destiney CGM. Has low blood sugar: rare. Aspirin use: Yes. Statin use: Yes. ACE-I/ARB use: Yes. Changes in health since last visit: back pain - spinal stenosis - not surgical candidate. Last eye exam 2025.     He has been without ozempic since February.  Hasn't been getting it through pt assistance for some reason.    Subjective      Diabetic Complications:  Eyes: Yes - BDR  Kidneys: No  Feet: Yes - neuropathy  Heart: Yes     Diet and Exercise:  Meals per day: 3  Minutes of exercise per week: 0 mins.    Review of Systems:   Review of Systems   Constitutional: Negative.    Cardiovascular: Negative.    Gastrointestinal: Negative.    Endocrine: Negative.        The following portions of the patient's history were reviewed and updated as appropriate: allergies, current medications, past family history, past medical history, past social history, past surgical history, and problem list.    Objective     Visit Vitals  /73   Pulse 74   Ht 180.3 cm (70.98\")   Wt (!) 138 kg (303 lb 3.2 oz)   SpO2 98%   BMI 42.31 kg/m²       Physical Exam:  Physical Exam  Constitutional:       Appearance: Normal appearance.   Neurological:      Mental Status: He is alert.         Labs:    HbA1c  Lab Results   Component Value Date    HGBA1C 11.1 (A) 2025       CMP  Lab Results   Component Value Date    GLUCOSE 99 2025    BUN 9 2025    CREATININE 1.03 2025    EGFRIFNONA 68 2021    BCR 8.7 2025    K 4.2 2025    CO2 24.5 2025    CALCIUM 9.6 2025    AST 23 " "01/08/2025    ALT 20 01/08/2025        Lipid Panel  Lab Results   Component Value Date    HDL Cholesterol 38 (L) 01/08/2025    LDL Cholesterol  141 (H) 01/08/2025    LDL/HDL Ratio 3.66 01/08/2025    Triglycerides 124 01/08/2025        TSH  Lab Results   Component Value Date    TSH 3.660 01/08/2025        Hemoglobin A1C  No components found for: \"HGBA1C\"     Microalbumin/Creatinine  Lab Results   Component Value Date    MALBCRERATIO  01/08/2025      Comment:      Unable to calculate    MICROALBUR <1.2 01/08/2025           Assessment / Plan      Assessment & Plan:  Diagnoses and all orders for this visit:    1. Type 2 diabetes mellitus with hyperglycemia, with long-term current use of insulin (Primary)  Assessment & Plan:  Diabetes is worsening.  He has been out of ozempic for about 5 months.  Was getting it through pt assistance.   Adjust insulin doses.  Try resuming ozempic if we can.  Diabetes will be reassessed in 3 months.    FreeStyle Destiney 3 CGM was downloaded today.  Data was reviewed from 7/9/25 to 7/22/25.  This showed improvement in overnight glucose but higher during the day and evening.  Will increase AM insulin to address this.  Time in range was 20%.      2. Essential hypertension  Assessment & Plan:  Hypertension is stable and controlled.  Continue current treatment regimen.  Blood pressure will be reassessed in 6 months.      3. Mixed hyperlipidemia  Assessment & Plan:  Continue statin and ezetimibe.  LDL above goal last visit.  Work on diet/exercise.  Plan to recheck next visit.      4. Atherosclerosis of native coronary artery of native heart without angina pectoris  Assessment & Plan:  Continue ASA, statin, ezetimibe and GLP-1 RA.       5. Type 2 diabetes mellitus with neurological manifestations  Assessment & Plan:  Continue gabapentin.  Controlled substance agreement was signed today.    Orders:  -     POC Glucose, Blood  -     POC Glycosylated Hemoglobin (Hb A1C)    Other orders  -     insulin " NPH-insulin regular (humuLIN 70/30,novoLIN 70/30) (70-30) 100 UNIT/ML injection; 100units am, 22 units pm      Current Outpatient Medications   Medication Instructions    acetaminophen (TYLENOL) 650 mg, Every 6 Hours PRN    allopurinol (ZYLOPRIM) 100 MG tablet 1 tablet, Daily    atorvastatin (LIPITOR) 80 MG tablet 1 tablet, Daily    B Complex Vitamins (B COMPLEX 100 PO) 1 tablet, 2 Times Daily    benzonatate (TESSALON) 100 mg    bisoprolol (ZEBeta) 10 MG tablet 1 tablet, Daily    bumetanide (BUMEX) 2 MG tablet 1 tablet, Daily PRN    clopidogrel (PLAVIX) 75 MG tablet 1 tablet, Daily    Continuous Blood Gluc Sensor (FreeStyle Destiney 2 Sensor) misc Use.    Continuous Glucose Sensor (FreeStyle Destiney 3 Plus Sensor) Not Applicable, See Admin Instructions, Change sensor every 15 days.  Dx E11.65    cyclobenzaprine (FLEXERIL) 5 mg, Oral, 3 Times Daily PRN    doxycycline (VIBRAMYCIN) 100 MG capsule 1 capsule, 2 Times Daily    EQ Aspirin Adult Low Dose 81 MG EC tablet Take 1 tablet by mouth once daily    ezetimibe (ZETIA) 10 MG tablet 1 tablet, Daily    fluticasone (FLONASE) 50 MCG/ACT nasal spray 2 sprays, Nasal, Daily    gabapentin (NEURONTIN) 400 mg, Oral, 3 Times Daily    glucose blood (OneTouch Ultra) test strip Test blood sugar twice daily.  Dx E11.65    insulin NPH-insulin regular (humuLIN 70/30,novoLIN 70/30) (70-30) 100 UNIT/ML injection 100units am, 22 units pm    Insulin Pen Needle (RELION PEN NEEDLE 31G/8MM) 31G X 8 MM misc Use twice daily.    Lancets (onetouch ultrasoft) lancets Test 2 times daily DX E11.65    lidocaine (Lidoderm) 5 % 1 patch, Transdermal, Every 24 Hours, Remove & Discard patch within 12 hours or as directed by MD    losartan (COZAAR) 75 mg, Oral, Daily    meloxicam (MOBIC) 7.5 mg    minocycline (MINOCIN,DYNACIN) 100 mg, Daily    nitroglycerin (NITROSTAT) 0.4 MG SL tablet 1 under the tongue as needed for angina, may repeat q5mins for up three doses    potassium chloride (K-DUR,KLOR-CON) 20 MEQ CR  tablet 20 mEq, Oral, Daily PRN    potassium chloride (KLOR-CON) 10 MEQ CR tablet 10 mEq    probenecid (BENEMID) 500 mg, Oral, 2 Times Daily    rosuvastatin (CRESTOR) 10 mg, Oral, Daily    Semaglutide (1 MG/DOSE) (OZEMPIC) 1 mg, Subcutaneous, Weekly    sildenafil (VIAGRA) 100 MG tablet 1 tablet, Daily    traMADol (ULTRAM) 50 mg      Return in about 3 months (around 10/22/2025) for Recheck with A1c.    Electronically signed by: Misael Vidales MD  07/22/2025

## 2025-08-04 ENCOUNTER — TELEPHONE (OUTPATIENT)
Dept: ENDOCRINOLOGY | Facility: CLINIC | Age: 75
End: 2025-08-04
Payer: MEDICARE

## 2025-08-15 ENCOUNTER — TELEPHONE (OUTPATIENT)
Dept: ENDOCRINOLOGY | Facility: CLINIC | Age: 75
End: 2025-08-15
Payer: MEDICARE

## 2025-08-22 ENCOUNTER — TELEPHONE (OUTPATIENT)
Dept: ENDOCRINOLOGY | Facility: CLINIC | Age: 75
End: 2025-08-22
Payer: MEDICARE

## (undated) DEVICE — ST EXT IV SMARTSITE 2VLV SP M LL 5ML IV1

## (undated) DEVICE — LEX ELECTRO PHYSIOLOGY: Brand: MEDLINE INDUSTRIES, INC.

## (undated) DEVICE — ACQGUIDE MINI-S, 65CM - L2 WITH ACQCROSS QX: Brand: ACQGUIDE MINI-S

## (undated) DEVICE — INTRAOPERATIVE COVER KIT, 10 PACK: Brand: SITE-RITE

## (undated) DEVICE — KT MANIFLD EP

## (undated) DEVICE — INTRO SHEATH ENGAGE W/50 GW .038 9F12

## (undated) DEVICE — ST INF PRI SMRTSTE 20DRP 2VLV 24ML 117

## (undated) DEVICE — STERILE (15.2 TAPERED TO 7.6 X 183CM) POLYETHYLENE ACCORDION-FOLDED COVER FOR USE WITH SIEMENS ACUNAV ULTRASOUND CATHETER FAMILY CONNECTOR: Brand: SWIFTLINK TRANSDUCER COVER

## (undated) DEVICE — CATH ULTRASND ECHO ACUNAV FOR ACUSON 8F 90CM

## (undated) DEVICE — ACCESS SHEATH WITH DILATOR: Brand: WATCHMAN™ TRUSEAL™ ACCESS SYSTEM

## (undated) DEVICE — KT VLV HEMO MAP ACC PLS LG/BORE MTL/INTRO W/TORQ/DEV

## (undated) DEVICE — Device: Brand: MEDEX

## (undated) DEVICE — INTRO HEMO ULTIMUM EV .035 12F 12CM

## (undated) DEVICE — SOL NACL 0.9PCT 1000ML

## (undated) DEVICE — CANN NASL CO2 DIVIDED A/

## (undated) DEVICE — GW DIAG .032

## (undated) DEVICE — DOME MONITORING W BONDED STPCK BIOTRANS2

## (undated) DEVICE — SYS CLS VASC/VENI VASCADE MVP 6TO12F

## (undated) DEVICE — CATH DIAG EXPO .052 PIG145 6F 110CM

## (undated) DEVICE — DECANT BG O JET

## (undated) DEVICE — LIMB HOLDER, WRIST/ANKLE: Brand: DEROYAL

## (undated) DEVICE — DRSNG SURESITE123 4X4.8IN

## (undated) DEVICE — SET PRIMARY GRVTY 10DP MALE LL 104IN

## (undated) DEVICE — DECANTER: Brand: UNBRANDED

## (undated) DEVICE — INTRO SHEATH ENGAGE W/50 GW .038 7F12